# Patient Record
Sex: FEMALE | Race: BLACK OR AFRICAN AMERICAN | Employment: UNEMPLOYED | URBAN - NONMETROPOLITAN AREA
[De-identification: names, ages, dates, MRNs, and addresses within clinical notes are randomized per-mention and may not be internally consistent; named-entity substitution may affect disease eponyms.]

---

## 2019-01-14 LAB
ALBUMIN: 3.7 G/DL (ref 3.5–5)
ALP BLD-CCNC: 63 U/L (ref 45–117)
ALT SERPL-CCNC: 16 U/L (ref 12–78)
AST SERPL-CCNC: 15 U/L (ref 15–37)
BASOPHILS ABSOLUTE: 0 10'3/UL (ref 0.1–0.2)
BASOPHILS RELATIVE PERCENT: 0.6 % (ref 0–1.7)
BILIRUB SERPL-MCNC: 0.3 MG/DL (ref 0–1)
BUN BLDV-MCNC: 10 MG/DL (ref 7–18)
CALCIUM SERPL-MCNC: 8.6 MG/DL (ref 8.5–10.1)
CHLORIDE BLD-SCNC: 108 MMOL/L (ref 97–107)
CO2: 24 MMOL/L (ref 21–32)
CREAT SERPL-MCNC: 0.81 MG/DL (ref 0.55–1.02)
EOSINOPHILS ABSOLUTE: 0.1 10'3/UL (ref 0–0.4)
EOSINOPHILS RELATIVE PERCENT: 3.5 % (ref 0–6.4)
GFR CALCULATED: > 60
GLUCOSE: 107 MG/DL (ref 70–99)
HCT VFR BLD CALC: 35.9 % (ref 34.6–44.1)
HEMOGLOBIN: 11.6 G/DL (ref 11.7–14.9)
LYMPHOCYTES ABSOLUTE: 1.2 10'3/UL (ref 0.5–3.5)
LYMPHOCYTES RELATIVE PERCENT: 33.4 % (ref 17.4–45.9)
MCH RBC QN AUTO: 27.2 PG (ref 27.8–33.2)
MCHC RBC AUTO-ENTMCNC: 32.2 G/DL (ref 32.7–34.8)
MCV RBC AUTO: 84.3 FL (ref 83–97.4)
MONOCYTES ABSOLUTE: 0.5 10'3/UL (ref 0.2–0.8)
MONOCYTES RELATIVE PERCENT: 12.8 % (ref 4.4–12)
NEUTROPHILS ABSOLUTE: 1.8 10'3/UL (ref 1.5–5.6)
NEUTROPHILS SEGMENTED: 49.7 % (ref 41.2–72.1)
PDW BLD-RTO: 15.4 % (ref 12.2–15.8)
PLATELET # BLD: 85 10'3/UL (ref 122–359)
PMV BLD AUTO: 8.7 FL (ref 7.6–10.6)
POTASSIUM SERPL-SCNC: 3.9 MMOL/L (ref 3.5–5.1)
RBC # BLD: 4.26 10'6/UL (ref 3.85–4.88)
SODIUM BLD-SCNC: 141 MMOL/L (ref 136–145)
TOTAL PROTEIN: 8 G/DL (ref 6.4–8.2)
WBC: 3.6 10'3/UL (ref 3.2–9.3)

## 2019-01-16 ENCOUNTER — OFFICE VISIT (OUTPATIENT)
Dept: FAMILY MEDICINE CLINIC | Age: 44
End: 2019-01-16
Payer: COMMERCIAL

## 2019-01-16 VITALS
OXYGEN SATURATION: 98 % | HEART RATE: 71 BPM | HEIGHT: 68 IN | SYSTOLIC BLOOD PRESSURE: 140 MMHG | BODY MASS INDEX: 31.98 KG/M2 | WEIGHT: 211 LBS | DIASTOLIC BLOOD PRESSURE: 108 MMHG

## 2019-01-16 DIAGNOSIS — D69.6 THROMBOCYTOPENIA (HCC): ICD-10-CM

## 2019-01-16 DIAGNOSIS — I10 HYPERTENSION, UNSPECIFIED TYPE: ICD-10-CM

## 2019-01-16 DIAGNOSIS — L20.9 ATOPIC DERMATITIS, UNSPECIFIED TYPE: ICD-10-CM

## 2019-01-16 DIAGNOSIS — D64.9 ANEMIA, UNSPECIFIED TYPE: ICD-10-CM

## 2019-01-16 DIAGNOSIS — L29.9 GENERALIZED PRURITUS: ICD-10-CM

## 2019-01-16 DIAGNOSIS — R59.0 AXILLARY ADENOPATHY: Primary | ICD-10-CM

## 2019-01-16 DIAGNOSIS — G47.10 EXCESSIVE SOMNOLENCE DISORDER: ICD-10-CM

## 2019-01-16 DIAGNOSIS — G93.32 CHRONIC FATIGUE SYNDROME: ICD-10-CM

## 2019-01-16 PROCEDURE — 99203 OFFICE O/P NEW LOW 30 MIN: CPT | Performed by: FAMILY MEDICINE

## 2019-01-16 RX ORDER — METOPROLOL SUCCINATE 200 MG/1
TABLET, EXTENDED RELEASE ORAL
Refills: 0 | COMMUNITY
Start: 2019-01-08 | End: 2019-03-15 | Stop reason: ALTCHOICE

## 2019-01-16 RX ORDER — CLOBETASOL PROPIONATE 0.5 MG/G
CREAM TOPICAL
COMMUNITY
End: 2020-05-06

## 2019-01-16 RX ORDER — INDOMETHACIN 50 MG/1
CAPSULE ORAL
COMMUNITY
End: 2019-04-15

## 2019-01-16 RX ORDER — IRBESARTAN AND HYDROCHLOROTHIAZIDE 150; 12.5 MG/1; MG/1
1 TABLET, FILM COATED ORAL DAILY
Qty: 30 TABLET | Refills: 5 | Status: SHIPPED | OUTPATIENT
Start: 2019-01-16 | End: 2019-02-26 | Stop reason: DRUGHIGH

## 2019-01-16 RX ORDER — FEXOFENADINE HCL 180 MG/1
TABLET ORAL
COMMUNITY
End: 2019-06-14 | Stop reason: CLARIF

## 2019-01-16 RX ORDER — AMLODIPINE BESYLATE 10 MG/1
TABLET ORAL
COMMUNITY
End: 2019-03-15 | Stop reason: SDUPTHER

## 2019-01-16 RX ORDER — PIMECROLIMUS 10 MG/G
CREAM TOPICAL
COMMUNITY
End: 2021-04-27

## 2019-01-16 RX ORDER — AVOBENZONE, HOMOSALATE, OCTISALATE, OCTOCRYLENE 30; 100; 50; 25 MG/ML; MG/ML; MG/ML; MG/ML
SPRAY TOPICAL
Refills: 0 | COMMUNITY
Start: 2019-01-11 | End: 2019-04-12 | Stop reason: SDUPTHER

## 2019-01-16 RX ORDER — NITROGLYCERIN 0.4 MG/1
0.4 TABLET SUBLINGUAL EVERY 5 MIN PRN
COMMUNITY
End: 2020-10-22

## 2019-01-16 ASSESSMENT — PATIENT HEALTH QUESTIONNAIRE - PHQ9
SUM OF ALL RESPONSES TO PHQ9 QUESTIONS 1 & 2: 0
2. FEELING DOWN, DEPRESSED OR HOPELESS: 0
SUM OF ALL RESPONSES TO PHQ QUESTIONS 1-9: 0
SUM OF ALL RESPONSES TO PHQ QUESTIONS 1-9: 0
1. LITTLE INTEREST OR PLEASURE IN DOING THINGS: 0

## 2019-01-20 PROBLEM — L80 VITILIGO: Status: ACTIVE | Noted: 2019-01-20

## 2019-01-20 ASSESSMENT — ENCOUNTER SYMPTOMS
COUGH: 0
ABDOMINAL PAIN: 0
ABDOMINAL DISTENTION: 0
CONSTIPATION: 0
BLOOD IN STOOL: 0
DIARRHEA: 0
WHEEZING: 0
SHORTNESS OF BREATH: 0
VOMITING: 0

## 2019-02-02 LAB
AGE FOR GFR: 45
ANION GAP SERPL CALCULATED.3IONS-SCNC: 13 MMOL/L
BUN BLDV-MCNC: 10 MG/DL
CALCIUM SERPL-MCNC: 9.3 MG/DL
CHLORIDE BLD-SCNC: 104 MMOL/L
CO2: 25 MMOL/L
CREAT SERPL-MCNC: 0.8 MG/DL
EGFR BF: 94 ML/MIN/1.73 M2
EGFR BM: 127 ML/MIN/1.73 M2
EGFR WF: 78 ML/MIN/1.73 M2
EGFR WM: 105 ML/MIN/1.73 M2
GLUCOSE: 98 MG/DL
POTASSIUM SERPL-SCNC: 3.9 MMOL/L
SODIUM BLD-SCNC: 138 MMOL/L

## 2019-02-15 ENCOUNTER — TELEPHONE (OUTPATIENT)
Dept: FAMILY MEDICINE CLINIC | Age: 44
End: 2019-02-15

## 2019-02-15 DIAGNOSIS — I10 ESSENTIAL HYPERTENSION: Primary | ICD-10-CM

## 2019-02-19 RX ORDER — LISINOPRIL AND HYDROCHLOROTHIAZIDE 20; 12.5 MG/1; MG/1
1 TABLET ORAL DAILY
Qty: 30 TABLET | Refills: 3 | Status: SHIPPED | OUTPATIENT
Start: 2019-02-19 | End: 2019-06-14 | Stop reason: SINTOL

## 2019-02-26 ENCOUNTER — OFFICE VISIT (OUTPATIENT)
Dept: PULMONOLOGY | Age: 44
End: 2019-02-26
Payer: COMMERCIAL

## 2019-02-26 ENCOUNTER — TELEPHONE (OUTPATIENT)
Dept: FAMILY MEDICINE CLINIC | Age: 44
End: 2019-02-26

## 2019-02-26 VITALS
WEIGHT: 218 LBS | OXYGEN SATURATION: 99 % | SYSTOLIC BLOOD PRESSURE: 148 MMHG | BODY MASS INDEX: 33.04 KG/M2 | HEIGHT: 68 IN | DIASTOLIC BLOOD PRESSURE: 92 MMHG | HEART RATE: 62 BPM

## 2019-02-26 DIAGNOSIS — G47.19 EXCESSIVE DAYTIME SLEEPINESS: Primary | ICD-10-CM

## 2019-02-26 DIAGNOSIS — R22.1 LOCALIZED SWELLING, MASS OR LUMP OF NECK: Primary | ICD-10-CM

## 2019-02-26 DIAGNOSIS — I10 RESISTANT HYPERTENSION: ICD-10-CM

## 2019-02-26 DIAGNOSIS — I10 ESSENTIAL HYPERTENSION: Primary | ICD-10-CM

## 2019-02-26 DIAGNOSIS — R53.83 FATIGUE, UNSPECIFIED TYPE: ICD-10-CM

## 2019-02-26 DIAGNOSIS — R06.09 DYSPNEA ON EXERTION: ICD-10-CM

## 2019-02-26 DIAGNOSIS — R22.1 LOCALIZED SWELLING, MASS OR LUMP OF NECK: ICD-10-CM

## 2019-02-26 PROCEDURE — 99205 OFFICE O/P NEW HI 60 MIN: CPT | Performed by: INTERNAL MEDICINE

## 2019-02-26 RX ORDER — OMEPRAZOLE 20 MG/1
20 CAPSULE, DELAYED RELEASE ORAL DAILY PRN
COMMUNITY
End: 2021-04-27

## 2019-02-26 ASSESSMENT — SLEEP AND FATIGUE QUESTIONNAIRES
HOW LIKELY ARE YOU TO NOD OFF OR FALL ASLEEP WHILE WATCHING TV: 0
HOW LIKELY ARE YOU TO NOD OFF OR FALL ASLEEP WHEN YOU ARE A PASSENGER IN A CAR FOR AN HOUR WITHOUT A BREAK: 1
HOW LIKELY ARE YOU TO NOD OFF OR FALL ASLEEP WHILE SITTING AND READING: 2
HOW LIKELY ARE YOU TO NOD OFF OR FALL ASLEEP WHILE SITTING AND TALKING TO SOMEONE: 0
ESS TOTAL SCORE: 4
HOW LIKELY ARE YOU TO NOD OFF OR FALL ASLEEP WHILE LYING DOWN TO REST IN THE AFTERNOON WHEN CIRCUMSTANCES PERMIT: 1
HOW LIKELY ARE YOU TO NOD OFF OR FALL ASLEEP WHILE SITTING QUIETLY AFTER LUNCH WITHOUT ALCOHOL: 0
HOW LIKELY ARE YOU TO NOD OFF OR FALL ASLEEP IN A CAR, WHILE STOPPED FOR A FEW MINUTES IN TRAFFIC: 0
HOW LIKELY ARE YOU TO NOD OFF OR FALL ASLEEP WHILE SITTING INACTIVE IN A PUBLIC PLACE: 0

## 2019-02-27 ENCOUNTER — TELEPHONE (OUTPATIENT)
Dept: FAMILY MEDICINE CLINIC | Age: 44
End: 2019-02-27

## 2019-03-15 ENCOUNTER — OFFICE VISIT (OUTPATIENT)
Dept: FAMILY MEDICINE CLINIC | Age: 44
End: 2019-03-15
Payer: COMMERCIAL

## 2019-03-15 VITALS
BODY MASS INDEX: 31.83 KG/M2 | HEART RATE: 92 BPM | HEIGHT: 68 IN | WEIGHT: 210 LBS | DIASTOLIC BLOOD PRESSURE: 80 MMHG | SYSTOLIC BLOOD PRESSURE: 130 MMHG | OXYGEN SATURATION: 98 %

## 2019-03-15 DIAGNOSIS — L29.9 GENERALIZED PRURITUS: ICD-10-CM

## 2019-03-15 DIAGNOSIS — G93.32 CHRONIC FATIGUE SYNDROME: ICD-10-CM

## 2019-03-15 DIAGNOSIS — R22.1 LOCALIZED SWELLING, MASS OR LUMP OF NECK: ICD-10-CM

## 2019-03-15 DIAGNOSIS — R53.83 FATIGUE, UNSPECIFIED TYPE: ICD-10-CM

## 2019-03-15 DIAGNOSIS — I10 ESSENTIAL HYPERTENSION: Primary | ICD-10-CM

## 2019-03-15 LAB
AGE FOR GFR: 45
ANION GAP SERPL CALCULATED.3IONS-SCNC: 16 MMOL/L
BUN BLDV-MCNC: 11 MG/DL (ref 7–17)
CALCIUM SERPL-MCNC: 9.6 MG/DL (ref 8.4–10.2)
CHLORIDE BLD-SCNC: 104 MMOL/L (ref 98–120)
CO2: 23 MMOL/L (ref 22–31)
CREAT SERPL-MCNC: 0.7 MG/DL (ref 0.5–1)
EGFR BF: 109 ML/MIN/1.73 M2
EGFR BM: 148 ML/MIN/1.73 M2
EGFR WF: 90 ML/MIN/1.73 M2
EGFR WM: 122 ML/MIN/1.73 M2
GLUCOSE FASTING: 97 MG/DL
GLUCOSE: 97 MG/DL (ref 65–105)
POTASSIUM SERPL-SCNC: 3.8 MMOL/L (ref 3.6–5)
SODIUM BLD-SCNC: 139 MMOL/L (ref 135–145)

## 2019-03-15 PROCEDURE — 99214 OFFICE O/P EST MOD 30 MIN: CPT | Performed by: FAMILY MEDICINE

## 2019-03-15 RX ORDER — AMLODIPINE BESYLATE 10 MG/1
10 TABLET ORAL NIGHTLY
Qty: 30 TABLET | Refills: 5 | Status: SHIPPED | OUTPATIENT
Start: 2019-03-15 | End: 2019-09-27 | Stop reason: SDUPTHER

## 2019-03-21 DIAGNOSIS — M79.89 SOFT TISSUE MASS: Primary | ICD-10-CM

## 2019-03-21 ASSESSMENT — ENCOUNTER SYMPTOMS
SHORTNESS OF BREATH: 0
ABDOMINAL PAIN: 0
CHOKING: 1
WHEEZING: 0
COUGH: 0

## 2019-03-28 ENCOUNTER — TELEPHONE (OUTPATIENT)
Dept: FAMILY MEDICINE CLINIC | Age: 44
End: 2019-03-28

## 2019-03-28 NOTE — TELEPHONE ENCOUNTER
Pt is wondering if she should continue the ibuprofen for the swelling and pain in her neck area.  Or if there are any other options for her

## 2019-04-12 DIAGNOSIS — J30.9 ALLERGIC RHINITIS, UNSPECIFIED SEASONALITY, UNSPECIFIED TRIGGER: ICD-10-CM

## 2019-04-12 DIAGNOSIS — G93.32 CHRONIC FATIGUE SYNDROME: Primary | ICD-10-CM

## 2019-04-12 RX ORDER — FEXOFENADINE HCL 180 MG/1
180 TABLET ORAL DAILY
Qty: 30 TABLET | Refills: 5 | Status: SHIPPED | OUTPATIENT
Start: 2019-04-12 | End: 2019-10-12 | Stop reason: SDUPTHER

## 2019-04-12 RX ORDER — AVOBENZONE, HOMOSALATE, OCTISALATE, OCTOCRYLENE 30; 100; 50; 25 MG/ML; MG/ML; MG/ML; MG/ML
1000 SPRAY TOPICAL DAILY
Qty: 30 TABLET | Refills: 0 | Status: SHIPPED | OUTPATIENT
Start: 2019-04-12 | End: 2019-07-12 | Stop reason: SDUPTHER

## 2019-04-15 ENCOUNTER — OFFICE VISIT (OUTPATIENT)
Dept: FAMILY MEDICINE CLINIC | Age: 44
End: 2019-04-15
Payer: COMMERCIAL

## 2019-04-15 VITALS
DIASTOLIC BLOOD PRESSURE: 79 MMHG | HEART RATE: 70 BPM | WEIGHT: 211 LBS | OXYGEN SATURATION: 90 % | SYSTOLIC BLOOD PRESSURE: 126 MMHG | BODY MASS INDEX: 32.08 KG/M2

## 2019-04-15 DIAGNOSIS — R22.1 LOCALIZED SWELLING, MASS OR LUMP OF NECK: ICD-10-CM

## 2019-04-15 DIAGNOSIS — I10 ESSENTIAL HYPERTENSION: Primary | ICD-10-CM

## 2019-04-15 PROCEDURE — 99213 OFFICE O/P EST LOW 20 MIN: CPT | Performed by: FAMILY MEDICINE

## 2019-04-15 RX ORDER — CLONIDINE HYDROCHLORIDE 0.1 MG/1
TABLET ORAL
Qty: 30 TABLET | Refills: 3 | Status: SHIPPED | OUTPATIENT
Start: 2019-04-15 | End: 2019-08-13 | Stop reason: SDUPTHER

## 2019-04-15 NOTE — PROGRESS NOTES
1200 [facility]  [address]  Dept: [phone]  DeptFax: [phone]    Jeri Padilla is a35 y.o. female who presents today for her medical conditions/complaints as noted below. Jeri Padilla is c/o of Hypertension (pt says her bp has been elevated since friday highest is 176/104, has been having ha, vision in [place]" and also has been nauseated, feels heart beating fast as well)      HPI:     HPI   Patient comes in with complaints of elevated blood pressure. This occurred over the weekend. She checked her blood pressure because of the way she was feeling. She is having a headache. Her vision seems blurred. She was nauseated. She checked her blood pressure was as high as 176/104. She says her pulse was fast.  Was as high as 112. This was on her blood pressure cuff. She thinks it was a regular pulse. Her blood pressure medications are confirmed. Also she is not taking a lot of ibuprofen or nonsteroidals. She is told by oncology that that's not a good idea. She's been taking Tylenol for pain in her neck. In the left supraclavicular fossa. She denied any dietary indiscretion. She watches her salt. She has not yet gotten her sleep study done. She states that she will call pulmonology and get the sleep study scheduled in the center that is covered by her insurance. When her blood pressure is elevated she was not diaphoretic. She did not experience any palpitations. Just transient elevation of her pulse. No systemic symptoms. No fevers or chills. No waking or weight loss. She states that she did have her thyroid functions done here at the hospital now with cannot locate that laboratory as of yet. Did not repeat a TSH as she knows she had it done but she no longer has headache. Her nausea is better but she has been eating much. Taking fluids. Water. For fear that she would get sick if she ate too much.   She does not have stomach pain. BP Readings from Last 3 Encounters:   04/15/19 126/79   03/15/19 130/80   02/26/19 (!) 148/92            (goal 120/80)    No past medical history on file. No past surgical history on file. No family history on file. Social History     Tobacco Use    Smoking status: Never Smoker    Smokeless tobacco: Never Used   Substance Use Topics    Alcohol use: Not on file        Current Outpatient Medications   Medication Sig Dispense Refill    cloNIDine (CATAPRES) 0.1 MG tablet Take one as needed for blood pressure over 160 30 tablet 3    RA VITAMIN D-3 1000 units TABS tablet Take 1 tablet by mouth daily 30 tablet 0    fexofenadine (ALLEGRA) 180 MG tablet Take 1 tablet by mouth daily 30 tablet 5    amLODIPine (NORVASC) 10 MG tablet Take 1 tablet by mouth nightly 30 tablet 5    omeprazole (PRILOSEC) 20 MG delayed release capsule Take 20 mg by mouth daily      lisinopril-hydrochlorothiazide (PRINZIDE;ZESTORETIC) 20-12.5 MG per tablet Take 1 tablet by mouth daily 30 tablet 3    fexofenadine (ALLEGRA) 180 MG tablet Allergy Relief (fexofenadine) 180 mg tablet      clobetasol (TEMOVATE) 0.05 % cream clobetasol 0.05 % topical cream      pimecrolimus (ELIDEL) 1 % cream Elidel 1 % topical cream      Carbonyl Iron 25 MG TABS 3 tablets daily      nitroGLYCERIN (NITROSTAT) 0.4 MG SL tablet Place 0.4 mg under the tongue every 5 minutes as needed for Chest pain up to max of 3 total doses. If no relief after 1 dose, call 911.  Omega-3 Fatty Acids (FISH OIL OMEGA-3 PO) Take by mouth      MISC NATURAL PRODUCTS PO Take by mouth Vitamin c, folic acid, and b complex       No current facility-administered medications for this visit.       Allergies   Allergen Reactions    Dexlansoprazole     Tetanus Toxoid        Health Maintenance   Topic Date Due    Cervical cancer screen  08/05/1994    Lipid screen  08/05/2013    HIV screen  01/16/2029 (Originally 8/5/1988)    Flu vaccine (Season Ended) 09/01/2019    Potassium monitoring  03/15/2020    Creatinine monitoring  03/15/2020    Pneumococcal 0-64 years Vaccine  Aged Out       Lab Results   Component Value Date    K 3.8 03/15/2019    CREATININE 0.7 03/15/2019    AST 15 01/14/2019    ALT 16 01/14/2019    HCT 35.9 01/14/2019    GLUCOSE 97 03/15/2019    CALCIUM 9.6 03/15/2019    No results found for: CHOL, TRIG, HDL, LDLCHOLESTEROL    Subjective:      Review of Systems   Constitutional: Positive for appetite change. Negative for fatigue, fever and unexpected weight change. HENT: Negative. Respiratory: Negative. Cardiovascular: Negative for chest pain, palpitations and leg swelling. Gastrointestinal: Positive for nausea. Negative for abdominal distention, abdominal pain and vomiting. Neurological: Positive for headaches. Negative for tremors and weakness. Hematological: Negative for adenopathy. Psychiatric/Behavioral: Negative. Objective:     /79   Pulse 70   Wt 211 lb (95.7 kg)   SpO2 90%   BMI 32.08 kg/m²     Physical Exam   Constitutional: No distress. HENT:   Right Ear: Tympanic membrane normal.   Left Ear: Tympanic membrane normal.   Mouth/Throat: Oropharynx is clear and moist and mucous membranes are normal.   Neck: Normal range of motion. Normal carotid pulses present. Carotid bruit is not present. No tracheal deviation present. Thyromegaly present. Cardiovascular: Normal rate, regular rhythm, S1 normal, S2 normal and normal heart sounds. No murmur heard. Pulmonary/Chest: She has no decreased breath sounds. She has no wheezes. She has no rhonchi. She has no rales. Abdominal: Soft. Bowel sounds are normal. She exhibits no distension, no abdominal bruit and no mass. There is no tenderness. Assessment:      Diagnosis Orders   1. Essential hypertension  cloNIDine (CATAPRES) 0.1 MG tablet   2.  Localized swelling, mass or lump of neck              POC Testing Results (If Applicable):  No results found for this visit on 04/15/19. Plan:   Blood pressures confirmed his pain well controlled at the present time. She maintained cancer diagnosed activity. Continue current medications. She needs a sleep study. Deferred workup for pheochromocytoma. She is given a prescription for clonidine that she can take should the symptoms recur. And return to the office at that time. Otherwise return the office as scheduled. Orders Given:  No orders of the defined types were placed in this encounter. Prescriptions:    Orders Placed This Encounter   Medications    cloNIDine (CATAPRES) 0.1 MG tablet     Sig: Take one as needed for blood pressure over 160     Dispense:  30 tablet     Refill:  3        Return in about 2 months (around 6/14/2019). Electronically signed by Jade Claros MD on4/16/2019. **This report has been created using voice recognition software. It may contain minor errors which are inherent in voice recognition technology.

## 2019-04-16 ENCOUNTER — TELEPHONE (OUTPATIENT)
Dept: FAMILY MEDICINE CLINIC | Age: 44
End: 2019-04-16

## 2019-04-16 DIAGNOSIS — G47.10 EXCESSIVE SOMNOLENCE DISORDER: Primary | ICD-10-CM

## 2019-04-16 ASSESSMENT — ENCOUNTER SYMPTOMS
ABDOMINAL DISTENTION: 0
RESPIRATORY NEGATIVE: 1
ABDOMINAL PAIN: 0
NAUSEA: 1
VOMITING: 0

## 2019-04-16 NOTE — TELEPHONE ENCOUNTER
Called to let us know that [facility] does not take her insurance so they can not schedule her Pulmonology, she will need to go to [facility].    She believes she Rocio Geronimo is unaware of that and then they will have to continue to follow her    Needs sleep study set up with [facility]

## 2019-04-29 ENCOUNTER — TELEPHONE (OUTPATIENT)
Dept: FAMILY MEDICINE CLINIC | Age: 44
End: 2019-04-29

## 2019-05-03 ENCOUNTER — TELEPHONE (OUTPATIENT)
Dept: FAMILY MEDICINE CLINIC | Age: 44
End: 2019-05-03

## 2019-06-14 ENCOUNTER — OFFICE VISIT (OUTPATIENT)
Dept: FAMILY MEDICINE CLINIC | Age: 44
End: 2019-06-14
Payer: COMMERCIAL

## 2019-06-14 VITALS
WEIGHT: 205 LBS | SYSTOLIC BLOOD PRESSURE: 152 MMHG | BODY MASS INDEX: 31.17 KG/M2 | OXYGEN SATURATION: 99 % | HEART RATE: 85 BPM | DIASTOLIC BLOOD PRESSURE: 100 MMHG

## 2019-06-14 DIAGNOSIS — D69.6 THROMBOCYTOPENIA (HCC): ICD-10-CM

## 2019-06-14 DIAGNOSIS — G47.10 EXCESSIVE SOMNOLENCE DISORDER: ICD-10-CM

## 2019-06-14 DIAGNOSIS — G93.32 CHRONIC FATIGUE SYNDROME: ICD-10-CM

## 2019-06-14 DIAGNOSIS — I10 ESSENTIAL HYPERTENSION: Primary | ICD-10-CM

## 2019-06-14 DIAGNOSIS — B07.9 VIRAL WARTS, UNSPECIFIED TYPE: ICD-10-CM

## 2019-06-14 PROCEDURE — 99214 OFFICE O/P EST MOD 30 MIN: CPT | Performed by: FAMILY MEDICINE

## 2019-06-14 RX ORDER — LOSARTAN POTASSIUM AND HYDROCHLOROTHIAZIDE 12.5; 1 MG/1; MG/1
1 TABLET ORAL DAILY
Qty: 90 TABLET | Refills: 3 | Status: SHIPPED | OUTPATIENT
Start: 2019-06-14 | End: 2020-10-22 | Stop reason: SDUPTHER

## 2019-06-14 NOTE — PROGRESS NOTES
1200 [facility]  [address]  Dept: [phone]  DeptFax: [phone]    Rosemary Mercado is a35 y.o. female who presents today for her medical conditions/complaints as noted below. Rosemary Mercado is c/o of 3 Month Follow-Up (pt says she still has a cough, says is annoying and is getting worse. also states has a wart on her middle finger of right hand)      HPI:     HPI     Patient returns. 3-month checkup. Problems include    Hypertension. She remains on amlodipine 10 mg and lisinopril hydrochlorothiazide. She checks her blood pressures and they go up down but she says the clonidine and she has yet needed to use. Unfortunately we continue to struggle with getting her sleep study done. She says that she received calls from [facility] in [facility] and also from [place]. But nobody ever called back. I was under the impression that she will have to go outside to [facility]. We put in referral to [facility] for sleep study. Back in May. She has no chest pain or chest pressure. However she does complain of a dry cough. Nagging dry cough. Nonproductive. She has no edema. She denies shortness of breath or wheezing. No problems with swallowing or swallowing her throat. She continues to follow-up with oncology in [facility]. This is for thrombocytopenia or  thrombocytosis. She is due to follow-up with them shortly. We have no records directly from them. She is not currently taking any medications. She also has followed up with them for lymphadenopathy. And we recently did an ultrasound which documented a lipoma of the left supraclavicular fossa. She has not had any bleeding. She has no night sweats fevers or chills. She had a left axillary lymph node biopsy needle biopsy in May 2018 which was negative    She wants to know what can be done about her tinnitus. She has constant ringing of ears.   She does c, folic acid, and b complex       No current facility-administered medications for this visit. Allergies   Allergen Reactions    Dexlansoprazole     Tetanus Toxoid        Health Maintenance   Topic Date Due    Cervical cancer screen  08/05/1994    Lipid screen  08/05/2013    HIV screen  01/16/2029 (Originally 8/5/1988)    Flu vaccine (Season Ended) 09/01/2019    Potassium monitoring  03/15/2020    Creatinine monitoring  03/15/2020    Pneumococcal 0-64 years Vaccine  Aged Out       Lab Results   Component Value Date    K 3.8 03/15/2019    CREATININE 0.7 03/15/2019    AST 15 01/14/2019    ALT 16 01/14/2019    HCT 35.9 01/14/2019    GLUCOSE 97 03/15/2019    CALCIUM 9.6 03/15/2019    No results found for: CHOL, TRIG, HDL, LDLCHOLESTEROL    Subjective:      Review of Systems   Constitutional: Negative for appetite change, fatigue, fever and unexpected weight change. HENT: Positive for tinnitus. Negative for congestion, hearing loss, rhinorrhea, sinus pain and sore throat. Respiratory: Positive for cough. Negative for shortness of breath and wheezing. Cardiovascular: Negative for chest pain, palpitations and leg swelling. Gastrointestinal: Positive for nausea. Negative for abdominal distention, abdominal pain and vomiting. Neurological: Negative for tremors, weakness and headaches. Hematological: Negative for adenopathy. Psychiatric/Behavioral: Negative. Negative for dysphoric mood. Objective:     BP (!) 152/100   Pulse 85   Wt 205 lb (93 kg)   SpO2 99%   BMI 31.17 kg/m²     Physical Exam   Constitutional: No distress. HENT:   Right Ear: Tympanic membrane normal.   Left Ear: Tympanic membrane normal.   Mouth/Throat: Oropharynx is clear and moist and mucous membranes are normal.   Neck: Normal range of motion. Normal carotid pulses present. Carotid bruit is not present. No tracheal deviation present. Thyromegaly present.        Cardiovascular: Normal rate, regular rhythm, S1 normal, S2 normal and normal heart sounds. No murmur heard. Pulmonary/Chest: She has no decreased breath sounds. She has no wheezes. She has no rhonchi. She has no rales. Abdominal: Soft. Bowel sounds are normal. She exhibits no distension, no abdominal bruit and no mass. There is no tenderness. Musculoskeletal: She exhibits no edema. A couple of minute warts on the third and fourth fingers of the right hand. Barely visible       Assessment:      Diagnosis Orders   1. Essential hypertension     2. Excessive somnolence disorder     3. Chronic fatigue syndrome     4. Thrombocytopenia ([address]     5. Viral warts, unspecified type  salicylic acid 17 % gel            POC Testing Results (If Applicable):  No results found for this visit on 06/14/19. Plan: We will switch from lisinopril hydrochlorothiazide to losartan hydrochlorothiazide. Will try salicylic acid for the warts so they are very small. Suggested that she try some ambient noise to help her sleep at night. I am not familiar with central audio processing disorder. Not sure where she got that diagnosis although I am presuming is from [facility]. She will continue to monitor her blood pressure and recheck with me in 3 months sooner if any problems it would be nice if oncology would send me some notes. Orders Given:  No orders of the defined types were placed in this encounter. Prescriptions:    Orders Placed This Encounter   Medications    losartan-hydrochlorothiazide (HYZAAR) 100-12.5 MG per tablet     Sig: Take 1 tablet by mouth daily     Dispense:  90 tablet     Refill:  3    salicylic acid 17 % gel     Sig: Apply topically daily. Dispense:  1.25 g     Refill:  0        Return in about 3 months (around 9/14/2019). Electronically signed by Laly Stroud MD on6/16/2019. **This report has been created using voice recognition software.   It may contain minor errors which are inherent in voice recognition technology. **

## 2019-06-16 ASSESSMENT — ENCOUNTER SYMPTOMS
COUGH: 1
SINUS PAIN: 0
ABDOMINAL PAIN: 0
ABDOMINAL DISTENTION: 0
SHORTNESS OF BREATH: 0
RHINORRHEA: 0
VOMITING: 0
SORE THROAT: 0
NAUSEA: 1
WHEEZING: 0

## 2019-07-12 DIAGNOSIS — G93.32 CHRONIC FATIGUE SYNDROME: ICD-10-CM

## 2019-07-12 RX ORDER — AVOBENZONE, HOMOSALATE, OCTISALATE, OCTOCRYLENE 30; 100; 50; 25 MG/ML; MG/ML; MG/ML; MG/ML
SPRAY TOPICAL
Qty: 30 TABLET | Refills: 0 | Status: SHIPPED | OUTPATIENT
Start: 2019-07-12 | End: 2019-09-11 | Stop reason: SDUPTHER

## 2019-08-13 DIAGNOSIS — I10 ESSENTIAL HYPERTENSION: ICD-10-CM

## 2019-08-13 RX ORDER — CLONIDINE HYDROCHLORIDE 0.1 MG/1
TABLET ORAL
Qty: 30 TABLET | Refills: 3 | Status: SHIPPED | OUTPATIENT
Start: 2019-08-13 | End: 2019-12-08 | Stop reason: SDUPTHER

## 2019-08-20 LAB
ALBUMIN: 4.3 G/DL (ref 3.5–5)
ALP BLD-CCNC: 68 U/L (ref 45–117)
ALT SERPL-CCNC: 15 U/L (ref 12–78)
AST SERPL-CCNC: 22 U/L (ref 15–37)
BASOPHILS ABSOLUTE: 0 10'3/UL (ref 0.1–0.2)
BASOPHILS RELATIVE PERCENT: 0.6 % (ref 0–1.7)
BILIRUB SERPL-MCNC: 0.4 MG/DL (ref 0–1)
BUN BLDV-MCNC: 12 MG/DL (ref 7–18)
CALCIUM SERPL-MCNC: 9.7 MG/DL (ref 8.5–10.1)
CHLORIDE BLD-SCNC: 104 MMOL/L (ref 97–107)
CO2: 25 MMOL/L (ref 21–32)
CREAT SERPL-MCNC: 0.8 MG/DL (ref 0.55–1.02)
EOSINOPHILS ABSOLUTE: 0.1 10'3/UL (ref 0–0.4)
EOSINOPHILS RELATIVE PERCENT: 2.3 % (ref 0–6.4)
FERRITIN: 20.1 NG/ML (ref 8–388)
GFR CALCULATED: > 60
GLUCOSE: 126 MG/DL (ref 70–99)
HCT VFR BLD CALC: 32.7 % (ref 34.6–44.1)
HEMOGLOBIN: 10.5 G/DL (ref 11.7–14.9)
IRON SATURATION: 7 % (ref 20–50)
IRON: 27 UG/DL (ref 35–150)
LYMPHOCYTES ABSOLUTE: 0.9 10'3/UL (ref 0.5–3.5)
LYMPHOCYTES RELATIVE PERCENT: 16.2 % (ref 17.4–45.9)
MCH RBC QN AUTO: 25.1 PG (ref 27.8–33.2)
MCHC RBC AUTO-ENTMCNC: 32 G/DL (ref 32.7–34.8)
MCV RBC AUTO: 78.5 FL (ref 83–97.4)
MICROCYTOSIS: ABNORMAL
MONOCYTES ABSOLUTE: 0.4 10'3/UL (ref 0.2–0.8)
MONOCYTES RELATIVE PERCENT: 7 % (ref 4.4–12)
NEUTROPHILS ABSOLUTE: 4.1 10'3/UL (ref 1.5–5.6)
NEUTROPHILS SEGMENTED: 73.9 % (ref 41.2–72.1)
PDW BLD-RTO: 16.8 % (ref 12.2–15.8)
PLATELET # BLD: 81 10'3/UL (ref 122–359)
PMV BLD AUTO: 9.9 FL (ref 7.6–10.6)
POTASSIUM SERPL-SCNC: 3.3 MMOL/L (ref 3.5–5.1)
RBC # BLD: 4.17 10'6/UL (ref 3.85–4.88)
SODIUM BLD-SCNC: 138 MMOL/L (ref 136–145)
TOTAL IRON BINDING CAPACITY: 375 UG/DL (ref 250–450)
TOTAL PROTEIN: 8.1 G/DL (ref 6.4–8.2)
TRANSFERRIN: 9 % (ref 20–50)
UNSATURATED IRON BINDING CAPACITY: 348 UG/DL (ref 69–240)
WBC: 5.6 10'3/UL (ref 3.2–9.3)

## 2019-09-11 ENCOUNTER — OFFICE VISIT (OUTPATIENT)
Dept: FAMILY MEDICINE CLINIC | Age: 44
End: 2019-09-11
Payer: COMMERCIAL

## 2019-09-11 ENCOUNTER — TELEPHONE (OUTPATIENT)
Dept: PULMONOLOGY | Age: 44
End: 2019-09-11

## 2019-09-11 VITALS
BODY MASS INDEX: 30.56 KG/M2 | HEART RATE: 69 BPM | OXYGEN SATURATION: 99 % | WEIGHT: 201 LBS | SYSTOLIC BLOOD PRESSURE: 130 MMHG | DIASTOLIC BLOOD PRESSURE: 88 MMHG

## 2019-09-11 DIAGNOSIS — I80.8 THROMBOPHLEBITIS ARM: ICD-10-CM

## 2019-09-11 DIAGNOSIS — E55.9 VITAMIN D DEFICIENCY: ICD-10-CM

## 2019-09-11 DIAGNOSIS — G47.33 OSA (OBSTRUCTIVE SLEEP APNEA): Primary | ICD-10-CM

## 2019-09-11 DIAGNOSIS — G93.32 CHRONIC FATIGUE SYNDROME: ICD-10-CM

## 2019-09-11 PROCEDURE — 99213 OFFICE O/P EST LOW 20 MIN: CPT | Performed by: FAMILY MEDICINE

## 2019-09-11 RX ORDER — AVOBENZONE, HOMOSALATE, OCTISALATE, OCTOCRYLENE 30; 100; 50; 25 MG/ML; MG/ML; MG/ML; MG/ML
SPRAY TOPICAL
Qty: 30 TABLET | Refills: 5 | Status: SHIPPED | OUTPATIENT
Start: 2019-09-11 | End: 2020-05-06

## 2019-09-11 ASSESSMENT — ENCOUNTER SYMPTOMS
SHORTNESS OF BREATH: 0
WHEEZING: 0
GASTROINTESTINAL NEGATIVE: 1
COUGH: 0

## 2019-09-11 NOTE — PROGRESS NOTES
losartan-hydrochlorothiazide (HYZAAR) 100-12.5 MG per tablet Take 1 tablet by mouth daily 90 tablet 3    salicylic acid 17 % gel Apply topically daily. 1.25 g 0    fexofenadine (ALLEGRA) 180 MG tablet Take 1 tablet by mouth daily 30 tablet 5    amLODIPine (NORVASC) 10 MG tablet Take 1 tablet by mouth nightly 30 tablet 5    omeprazole (PRILOSEC) 20 MG delayed release capsule Take 20 mg by mouth daily      clobetasol (TEMOVATE) 0.05 % cream clobetasol 0.05 % topical cream      pimecrolimus (ELIDEL) 1 % cream Elidel 1 % topical cream      Carbonyl Iron 25 MG TABS 3 tablets daily      nitroGLYCERIN (NITROSTAT) 0.4 MG SL tablet Place 0.4 mg under the tongue every 5 minutes as needed for Chest pain up to max of 3 total doses. If no relief after 1 dose, call 911.  MISC NATURAL PRODUCTS PO Take by mouth Vitamin c, folic acid, and b complex       No current facility-administered medications for this visit. Allergies   Allergen Reactions    Dexlansoprazole     Tetanus Toxoid        Health Maintenance   Topic Date Due    Cervical cancer screen  08/05/1994    Lipid screen  08/05/2013    Flu vaccine (1) 09/01/2019    HIV screen  01/16/2029 (Originally 8/5/1988)    Potassium monitoring  08/20/2020    Creatinine monitoring  08/20/2020    Diabetes screen  03/15/2022    Pneumococcal 0-64 years Vaccine  Aged Out       Lab Results   Component Value Date    K 3.3 08/20/2019    CREATININE 0.80 08/20/2019    AST 22 08/20/2019    ALT 15 08/20/2019    HCT 32.7 08/20/2019    GLUCOSE 126 08/20/2019    CALCIUM 9.7 08/20/2019    FERRITIN 20.1 08/20/2019    TIBC 375 08/20/2019    IRON 27 08/20/2019    No results found for: CHOL, TRIG, HDL, LDLCHOLESTEROL    Subjective:      Review of Systems   Constitutional: Positive for fatigue. Negative for fever. HENT: Negative. Respiratory: Negative for cough, shortness of breath and wheezing. Cardiovascular: Negative for chest pain, palpitations and leg swelling.  Vitamin D 25 Hydroxy     Standing Status:   Future     Standing Expiration Date:   9/11/2020   Moraima Jasmine MD, Pulmonology, [place]     Referral Priority:   Urgent     Referral Type:   Eval and Treat     Referral Reason:   Specialty Services Required     Requested Specialty:   Pulmonology     Number of Visits Requested:   1    Ultrasound doppler venous arm left     Standing Status:   Future     Standing Expiration Date:   11/10/2019     Prescriptions:    Orders Placed This Encounter   Medications    RA VITAMIN D-3 1000 units TABS tablet     Sig: take 1 tablet by mouth once daily     Dispense:  30 tablet     Refill:  5        Return in about 8 days (around 9/19/2019). Electronically signed by Brian Jacques MD on9/11/2019. **This report has been created using voice recognition software. It may contain minor errors which are inherent in voice recognition technology. **

## 2019-09-13 ENCOUNTER — TELEPHONE (OUTPATIENT)
Dept: FAMILY MEDICINE CLINIC | Age: 44
End: 2019-09-13

## 2019-09-16 LAB
% SATURATION: 12 % (ref 15–38)
A/G RATIO: 1.2 RATIO
AGE FOR GFR: 46
ALBUMIN: 4.3 G/DL (ref 3.5–5)
ALK PHOSPHATASE: 63 UNITS/L (ref 38–126)
ALT SERPL-CCNC: 16 UNITS/L (ref 9–52)
ANION GAP SERPL CALCULATED.3IONS-SCNC: 15 MMOL/L
ANISOCYTOSIS: ABNORMAL
AST SERPL-CCNC: 30 UNITS/L (ref 14–36)
BASOPHILS # BLD: 0.07 THOU/MM3 (ref 0–0.3)
BILIRUB SERPL-MCNC: 0.5 MG/DL (ref 0.2–1.3)
BUN BLDV-MCNC: 9 MG/DL (ref 7–17)
CALCIUM SERPL-MCNC: 9.5 MG/DL (ref 8.4–10.2)
CHLORIDE BLD-SCNC: 102 MMOL/L (ref 98–120)
CO2: 26 MMOL/L (ref 22–31)
CREAT SERPL-MCNC: 0.7 MG/DL (ref 0.5–1)
DIFFERENTIAL: AUTOMATED DIFF
EGFR BF: 109 ML/MIN/1.73 M2
EGFR BM: 147 ML/MIN/1.73 M2
EGFR WF: 90 ML/MIN/1.73 M2
EGFR WM: 121 ML/MIN/1.73 M2
EOSINOPHIL # BLD: 0.11 THOU/MM3 (ref 0–1.1)
FERRITIN: 43 NG/ML (ref 10–28)
GLOBULIN: 3.5 G/DL
GLUCOSE: 91 MG/DL (ref 65–105)
HCT VFR BLD CALC: 37.3 % (ref 37–47)
HEMOGLOBIN: 10.9 G/DL (ref 12–16)
HYPOCHROMIA: ABNORMAL
IRON: 48 MG/DL (ref 37–170)
LYMPHOCYTES # BLD: 0.78 THOU/MM3 (ref 1–5.5)
MCH RBC QN AUTO: 24.7 PG (ref 28.5–32)
MCHC RBC AUTO-ENTMCNC: 29.2 G/DL (ref 32–37)
MCV RBC AUTO: 84.5 FL (ref 80–94)
MICROCYTOSIS: ABNORMAL
MONOCYTES # BLD: 0.46 THOU/MM3 (ref 0.1–1)
MORPHOLOGY: ABNORMAL
NEUTROPHILS: 2.37 THOU/MM3 (ref 2–8.1)
OVALOCYTES: ABNORMAL
PDW BLD-RTO: 18.7 % (ref 8.5–15.5)
PLATELET # BLD: 80 THOU/MM3 (ref 130–400)
PMV BLD AUTO: 8 FL (ref 7.4–11)
POTASSIUM SERPL-SCNC: 3.6 MMOL/L (ref 3.6–5)
RBC # BLD: 4.41 M/UL (ref 4.2–5.4)
SODIUM BLD-SCNC: 139 MMOL/L (ref 135–145)
TOTAL IRON BINDING CAPACITY: 399 MG/DL (ref 261–497)
TOTAL PROTEIN: 7.8 G/DL (ref 6.3–8.2)
WBC # BLD: 3.79 THOU/ML3 (ref 4.8–10)

## 2019-09-19 ENCOUNTER — OFFICE VISIT (OUTPATIENT)
Dept: FAMILY MEDICINE CLINIC | Age: 44
End: 2019-09-19
Payer: COMMERCIAL

## 2019-09-19 VITALS
BODY MASS INDEX: 30.87 KG/M2 | DIASTOLIC BLOOD PRESSURE: 88 MMHG | WEIGHT: 203 LBS | HEART RATE: 88 BPM | OXYGEN SATURATION: 95 % | SYSTOLIC BLOOD PRESSURE: 136 MMHG

## 2019-09-19 DIAGNOSIS — E55.9 VITAMIN D DEFICIENCY: ICD-10-CM

## 2019-09-19 DIAGNOSIS — G47.33 OSA (OBSTRUCTIVE SLEEP APNEA): ICD-10-CM

## 2019-09-19 DIAGNOSIS — G93.32 CHRONIC FATIGUE SYNDROME: ICD-10-CM

## 2019-09-19 DIAGNOSIS — I10 ESSENTIAL HYPERTENSION: ICD-10-CM

## 2019-09-19 DIAGNOSIS — I80.8 THROMBOPHLEBITIS ARM: Primary | ICD-10-CM

## 2019-09-19 PROCEDURE — 99214 OFFICE O/P EST MOD 30 MIN: CPT | Performed by: FAMILY MEDICINE

## 2019-09-19 ASSESSMENT — ENCOUNTER SYMPTOMS
WHEEZING: 0
COUGH: 0
GASTROINTESTINAL NEGATIVE: 1
SHORTNESS OF BREATH: 0
BACK PAIN: 0

## 2019-09-19 NOTE — PROGRESS NOTES
[address] [place] [address]  Dept: [phone]  DeptFax: [phone]    Vince Abarca is a51 y.o. female who presents today for her medical conditions/complaints as noted below. Vince Abarca is c/o of Hypertension      HPI:     HPI     Follow-up from previous visit. Her venous Doppler did indeed show a thrombus in the cephalic vein. Proximal to the elbow. Yesterday a prescription for Eliquis was sent to the pharmacy. And she was given samples of eliquis along with a rebate card. She has been under the care of Heme/Onc at [facility] though we are unable to access any records as of yet. She confirms that she has never had a blood clot before. He is otherwise asymptomatic. The pain is just located to the antecubital fossa. She has no cough. No hemoptysis        In addition at her last visit she was very frustrated about her sleep apnea and lack of follow-up. We attempted to refer her back to [facility] pulmonology. But due to her insurance that was not possible. She has contacted pulmonology at [facility]. Namely just received a request to fax her information to numbers at [facility]. Though these are [facility]. Dr. Cris Martin. She actually has an appointment October 3. Iron deficiency anemia. She has an iron infusion coming up. She wonders if that can be completed with the blood clot. She has not had previous infusion in the left arm. Hypertension  Well-controlled today. Confirmed that she takes her lisinopril hydrochlorothiazide in the morning and uses clonidine as needed. PRN systolic greater than 436. Does not have to use it very often. And she takes her amlodipine at bedtime.       BP Readings from Last 3 Encounters:   09/19/19 136/88   09/11/19 130/88   06/14/19 (!) 152/100            (goal 120/80)    Past Medical History:   Diagnosis Date    Chronic fatigue     Hypertension     Iron deficiency anemia     CALLY (obstructive sleep apnea)       Past Surgical History:   Procedure Laterality Date    LYMPH NODE BIOPSY       No family history on file. Social History     Tobacco Use    Smoking status: Never Smoker    Smokeless tobacco: Never Used   Substance Use Topics    Alcohol use: Not on file        Current Outpatient Medications   Medication Sig Dispense Refill    apixaban (ELIQUIS) 5 MG TABS tablet Take 1 tablet by mouth 2 times daily 60 tablet 1    RA VITAMIN D-3 1000 units TABS tablet take 1 tablet by mouth once daily 30 tablet 5    cloNIDine (CATAPRES) 0.1 MG tablet take 1 tablet by mouth once daily if needed for BLOOD PRESSURE OVER 160 30 tablet 3    losartan-hydrochlorothiazide (HYZAAR) 100-12.5 MG per tablet Take 1 tablet by mouth daily 90 tablet 3    salicylic acid 17 % gel Apply topically daily. 1.25 g 0    fexofenadine (ALLEGRA) 180 MG tablet Take 1 tablet by mouth daily 30 tablet 5    amLODIPine (NORVASC) 10 MG tablet Take 1 tablet by mouth nightly 30 tablet 5    omeprazole (PRILOSEC) 20 MG delayed release capsule Take 20 mg by mouth daily as needed      clobetasol (TEMOVATE) 0.05 % cream clobetasol 0.05 % topical cream      pimecrolimus (ELIDEL) 1 % cream Elidel 1 % topical cream      Carbonyl Iron 25 MG TABS 3 tablets daily      nitroGLYCERIN (NITROSTAT) 0.4 MG SL tablet Place 0.4 mg under the tongue every 5 minutes as needed for Chest pain up to max of 3 total doses. If no relief after 1 dose, call 911.  MISC NATURAL PRODUCTS PO Take by mouth Vitamin c, folic acid, and b complex       No current facility-administered medications for this visit.       Allergies   Allergen Reactions    Dexlansoprazole     Tetanus Toxoid        Health Maintenance   Topic Date Due    Cervical cancer screen  08/05/1994    Lipid screen  08/05/2013    Flu vaccine (1) 09/01/2019    HIV screen  01/16/2029 (Originally 8/5/1988)    Potassium monitoring  09/16/2020    Creatinine monitoring  09/16/2020    Pneumococcal 0-64 years Vaccine  Aged Out       Lab Results   Component Value Date    K 3.6 09/16/2019    CREATININE 0.7 09/16/2019    AST 30 09/16/2019    ALT 16 09/16/2019    HCT 37.3 09/16/2019    GLUCOSE 91 09/16/2019    CALCIUM 9.5 09/16/2019    FERRITIN 43 09/16/2019    TIBC 399 09/16/2019    IRON 48 09/16/2019    No results found for: CHOL, TRIG, HDL, LDLCHOLESTEROL    Subjective:      Review of Systems   Constitutional: Positive for fatigue (chronic). Negative for fever. HENT: Negative. Respiratory: Negative for cough, shortness of breath and wheezing. Cardiovascular: Negative for chest pain, palpitations and leg swelling. Gastrointestinal: Negative. Genitourinary: Negative. Musculoskeletal: Negative for back pain and joint swelling. Skin: Negative for rash and wound. Hematological: Negative for adenopathy. Does not bruise/bleed easily. Objective:     /88 (Site: Right Upper Arm, Position: Sitting, Cuff Size: Large Adult)   Pulse 88   Wt 203 lb (92.1 kg)   SpO2 95%   BMI 30.87 kg/m²     Physical Exam   Constitutional: No distress. Neck: Normal range of motion. Normal carotid pulses present. Carotid bruit is not present. No tracheal deviation present. No thyromegaly present. Cardiovascular: Normal rate, regular rhythm, S1 normal, S2 normal and normal heart sounds. No murmur heard. Pulmonary/Chest: She has no decreased breath sounds. She has no wheezes. She has no rhonchi. She has no rales. Chest wall is not dull to percussion. Abdominal: Soft. Bowel sounds are normal.   Musculoskeletal: She exhibits no edema. Left elbow: She exhibits normal range of motion and no swelling. Tenderness found. Lateral epicondyle tenderness noted. Arms:  Lymphadenopathy:     She has no cervical adenopathy. Left axillary: No pectoral and no lateral adenopathy present. Assessment:      Diagnosis Orders   1.  Thrombophlebitis arm Ultrasound doppler venous arm left   2. Chronic fatigue syndrome     3. CALLY (obstructive sleep apnea)     4. Vitamin D deficiency     5. Essential hypertension              POC Testing Results (If Applicable):  No results found for this visit on 09/19/19. Plan:     She remains on anticoagulation. If she cannot afford the Eliquis she needs to let us know. We have already faxed records to pulmonology at [facility]. Dr. Garret López. She will check and find out the name of her hematologist oncologist.  And sign a release of records so we can obtain them. She will continue current medications for blood pressure. I see no reason why she cannot have her iron infusion. We will repeat the venous Doppler of her left upper extremity in 6 weeks and have her follow-up shortly after that in the office. Sooner if any problems    Orders Given:  Orders Placed This Encounter   Procedures    Ultrasound doppler venous arm left     Standing Status:   Future     Standing Expiration Date:   11/18/2019     Prescriptions:    No orders of the defined types were placed in this encounter. No follow-ups on file. Electronically signed by Kathyrn Lefort, MD on9/19/2019. **This report has been created using voice recognition software. It may contain minor errors which are inherent in voice recognition technology. **

## 2019-09-27 DIAGNOSIS — I10 ESSENTIAL HYPERTENSION: Primary | ICD-10-CM

## 2019-09-27 RX ORDER — AMLODIPINE BESYLATE 10 MG/1
TABLET ORAL
Qty: 30 TABLET | Refills: 5 | Status: SHIPPED | OUTPATIENT
Start: 2019-09-27 | End: 2020-03-14

## 2019-10-12 DIAGNOSIS — J30.9 ALLERGIC RHINITIS, UNSPECIFIED SEASONALITY, UNSPECIFIED TRIGGER: ICD-10-CM

## 2019-10-14 RX ORDER — FEXOFENADINE HCL 180 MG
TABLET ORAL
Qty: 30 TABLET | Refills: 5 | Status: SHIPPED | OUTPATIENT
Start: 2019-10-14 | End: 2020-03-14

## 2019-11-19 LAB
ALBUMIN: 4.3 G/DL (ref 3.5–5.2)
ALP BLD-CCNC: 59 U/L (ref 40–150)
ALT SERPL-CCNC: 15 U/L (ref 0–55)
ANION GAP SERPL CALCULATED.3IONS-SCNC: 10 MEQ/L (ref 5–13)
AST SERPL-CCNC: 20 U/L (ref 5–34)
BASOPHILS ABSOLUTE: 0 10'3/UL (ref 0.1–0.2)
BASOPHILS RELATIVE PERCENT: 1.2 % (ref 0.3–0.8)
BILIRUB SERPL-MCNC: 0.5 MG/DL (ref 0–1)
BUN BLDV-MCNC: 9 MG/DL (ref 7–18)
CALCIUM SERPL-MCNC: 10 MG/DL (ref 8.4–10.2)
CHLORIDE BLD-SCNC: 105 MMOL/L (ref 98–107)
CO2: 27 MMOL/L (ref 22–29)
CREAT SERPL-MCNC: 0.8 MG/DL (ref 0.57–1.11)
EOSINOPHILS ABSOLUTE: 0.1 10'3/UL (ref 0–0.4)
EOSINOPHILS RELATIVE PERCENT: 3.1 % (ref 0–6.6)
FERRITIN: 108.6 NG/ML (ref 8–388)
GFR CALCULATED: > 60
GLUCOSE: 95 MG/DL (ref 70–99)
HCT VFR BLD CALC: 37.3 % (ref 34.2–44.1)
HEMOGLOBIN: 12.7 G/DL (ref 11.6–14.8)
LYMPHOCYTES ABSOLUTE: 1 10'3/UL (ref 0.7–3.3)
LYMPHOCYTES RELATIVE PERCENT: 36.5 % (ref 19.1–46.3)
MCH RBC QN AUTO: 30.5 PG (ref 27.6–33)
MCHC RBC AUTO-ENTMCNC: 34 G/DL (ref 32.9–34.5)
MCV RBC AUTO: 89.6 FL (ref 82.9–97.4)
MONOCYTES ABSOLUTE: 0.5 10'3/UL (ref 0.2–0.8)
MONOCYTES RELATIVE PERCENT: 16.4 % (ref 5.4–11.2)
NEUTROPHILS ABSOLUTE: 1.2 10'3/UL (ref 1.6–5)
NEUTROPHILS SEGMENTED: 42.8 % (ref 40.7–70)
PLATELET # BLD: 84 10'3/UL (ref 120–375)
PLATELET ESTIMATE: NORMAL
POTASSIUM SERPL-SCNC: 3.1 MMOL/L (ref 3.5–4.5)
RBC # BLD: 4.16 10'6/UL (ref 3.85–4.87)
SODIUM BLD-SCNC: 142 MMOL/L (ref 136–145)
TEAR DROP CELLS: NORMAL
TOTAL PROTEIN: 7.8 G/DL (ref 6.4–8.2)
WBC: 2.7 10'3/UL (ref 3.2–8.7)

## 2019-12-08 DIAGNOSIS — I10 ESSENTIAL HYPERTENSION: ICD-10-CM

## 2019-12-09 RX ORDER — CLONIDINE HYDROCHLORIDE 0.1 MG/1
TABLET ORAL
Qty: 30 TABLET | Refills: 3 | Status: SHIPPED | OUTPATIENT
Start: 2019-12-09 | End: 2020-03-14

## 2020-01-16 ENCOUNTER — TELEPHONE (OUTPATIENT)
Dept: FAMILY MEDICINE CLINIC | Age: 45
End: 2020-01-16

## 2020-01-16 RX ORDER — LOSARTAN POTASSIUM 100 MG/1
100 TABLET ORAL DAILY
Qty: 90 TABLET | Refills: 1 | Status: SHIPPED | OUTPATIENT
Start: 2020-01-16 | End: 2020-05-06

## 2020-01-16 RX ORDER — HYDROCHLOROTHIAZIDE 12.5 MG/1
12.5 TABLET ORAL DAILY
Qty: 90 TABLET | Refills: 3 | Status: SHIPPED | OUTPATIENT
Start: 2020-01-16 | End: 2020-05-06

## 2020-01-16 NOTE — TELEPHONE ENCOUNTER
Beverly Barbosa is calling to request a refill on the following medication(s):  Requested Prescriptions     Pending Prescriptions Disp Refills    losartan (COZAAR) 100 MG tablet 90 tablet 1     Sig: Take 1 tablet by mouth daily    hydrochlorothiazide (HYDRODIURIL) 12.5 MG tablet 90 tablet 3     Sig: Take 1 tablet by mouth daily       Last Visit Date (If Applicable):  Visit date not found    Next Visit Date:    Visit date not found

## 2020-03-14 RX ORDER — CLONIDINE HYDROCHLORIDE 0.1 MG/1
TABLET ORAL
Qty: 30 TABLET | Refills: 3 | Status: SHIPPED | OUTPATIENT
Start: 2020-03-14 | End: 2020-04-06

## 2020-03-14 RX ORDER — AMLODIPINE BESYLATE 10 MG/1
TABLET ORAL
Qty: 30 TABLET | Refills: 5 | Status: SHIPPED | OUTPATIENT
Start: 2020-03-14 | End: 2020-04-06

## 2020-03-14 RX ORDER — FEXOFENADINE HCL 180 MG
TABLET ORAL
Qty: 30 TABLET | Refills: 5 | Status: SHIPPED | OUTPATIENT
Start: 2020-03-14 | End: 2020-09-23 | Stop reason: SDUPTHER

## 2020-03-14 NOTE — TELEPHONE ENCOUNTER
Beverly Barbosa is calling to request a refill on the following medication(s):  Requested Prescriptions     Pending Prescriptions Disp Refills    cloNIDine (CATAPRES) 0.1 MG tablet [Pharmacy Med Name: CLONIDINE HCL 0.1 MG TABLET] 30 tablet 3     Sig: TAKE 1 TABLET ONCE A DAY IF NEEDED FOR BLOOD PRESSURE    amLODIPine (NORVASC) 10 MG tablet [Pharmacy Med Name: AMLODIPINE BESYLATE 10 MG TAB] 30 tablet 5     Sig: take 1 tablet by mouth at bedtime    RA ALLERGY RELIEF 180 MG tablet [Pharmacy Med Name: RA ALLERGY RELIEF 180 MG TAB] 30 tablet 5     Sig: take 1 tablet by mouth once daily       Last Visit Date (If Applicable):  2/21/7025    Next Visit Date:    Visit date not found

## 2020-05-06 ENCOUNTER — VIRTUAL VISIT (OUTPATIENT)
Dept: FAMILY MEDICINE CLINIC | Age: 45
End: 2020-05-06
Payer: COMMERCIAL

## 2020-05-06 VITALS — BODY MASS INDEX: 30.87 KG/M2 | HEIGHT: 68 IN

## 2020-05-06 PROCEDURE — 99213 OFFICE O/P EST LOW 20 MIN: CPT | Performed by: NURSE PRACTITIONER

## 2020-05-06 RX ORDER — PREDNISOLONE ACETATE 10 MG/ML
SUSPENSION/ DROPS OPHTHALMIC
COMMUNITY
Start: 2020-04-29 | End: 2020-07-01 | Stop reason: ALTCHOICE

## 2020-05-06 ASSESSMENT — PATIENT HEALTH QUESTIONNAIRE - PHQ9
SUM OF ALL RESPONSES TO PHQ QUESTIONS 1-9: 0
1. LITTLE INTEREST OR PLEASURE IN DOING THINGS: 0
2. FEELING DOWN, DEPRESSED OR HOPELESS: 0
SUM OF ALL RESPONSES TO PHQ QUESTIONS 1-9: 0
SUM OF ALL RESPONSES TO PHQ9 QUESTIONS 1 & 2: 0

## 2020-05-06 NOTE — PROGRESS NOTES
tablet by mouth at bedtime Yes Elton Mike MD   cloNIDine (CATAPRES) 0.1 MG tablet TAKE 1 TABLET ONCE A DAY IF NEEDED FOR BLOOD PRESSURE Yes Elton Mike MD   RA ALLERGY RELIEF 180 MG tablet take 1 tablet by mouth once daily Yes Elton Mike MD   losartan-hydrochlorothiazide (HYZAAR) 100-12.5 MG per tablet Take 1 tablet by mouth daily Yes Elton Mike MD   omeprazole (PRILOSEC) 20 MG delayed release capsule Take 20 mg by mouth daily as needed Yes Historical Provider, MD   pimecrolimus (ELIDEL) 1 % cream Elidel 1 % topical cream Yes Historical Provider, MD   Carbonyl Iron 25 MG TABS 3 tablets daily Yes Historical Provider, MD   nitroGLYCERIN (NITROSTAT) 0.4 MG SL tablet Place 0.4 mg under the tongue every 5 minutes as needed for Chest pain up to max of 3 total doses. If no relief after 1 dose, call 911. Yes Historical Provider, MD   prednisoLONE acetate (PRED FORTE) 1 % ophthalmic suspension   Historical Provider, MD       Health Maintenance   Topic Date Due    Cervical cancer screen  08/05/1994    Lipid screen  08/05/2013    HIV screen  01/16/2029 (Originally 8/5/1988)    Flu vaccine (Season Ended) 09/01/2020    Potassium monitoring  02/23/2021    Creatinine monitoring  02/23/2021    Hepatitis A vaccine  Aged Out    Hepatitis B vaccine  Aged Out    Hib vaccine  Aged Out    Meningococcal (ACWY) vaccine  Aged Out    Pneumococcal 0-64 years Vaccine  Aged Out        Review of Systems   Constitutional: Positive for fatigue (chronic, hx iron deficency anemia). Negative for chills and fever. HENT: Negative. Respiratory: Negative for cough, shortness of breath and wheezing. Cardiovascular: Negative for leg swelling. Gastrointestinal: Negative. Genitourinary: Negative. Musculoskeletal: Negative for back pain, gait problem, joint swelling and neck pain. Numbness to right leg. Psychiatric/Behavioral: Negative for sleep disturbance.      Vital Signs: (As obtained by

## 2020-05-10 PROBLEM — D50.9 IRON DEFICIENCY ANEMIA: Status: ACTIVE | Noted: 2020-05-10

## 2020-05-10 PROBLEM — R20.0 RIGHT LEG NUMBNESS: Status: ACTIVE | Noted: 2020-05-10

## 2020-05-10 PROBLEM — G47.33 OSA (OBSTRUCTIVE SLEEP APNEA): Status: ACTIVE | Noted: 2020-05-10

## 2020-05-10 PROBLEM — G93.32 CHRONIC FATIGUE SYNDROME: Status: ACTIVE | Noted: 2020-05-10

## 2020-05-10 PROBLEM — E55.9 VITAMIN D DEFICIENCY: Status: ACTIVE | Noted: 2020-05-10

## 2020-05-10 ASSESSMENT — ENCOUNTER SYMPTOMS
GASTROINTESTINAL NEGATIVE: 1
BACK PAIN: 0
WHEEZING: 0
COUGH: 0
SHORTNESS OF BREATH: 0

## 2020-05-19 ENCOUNTER — OFFICE VISIT (OUTPATIENT)
Dept: FAMILY MEDICINE CLINIC | Age: 45
End: 2020-05-19
Payer: COMMERCIAL

## 2020-05-19 ENCOUNTER — HOSPITAL ENCOUNTER (OUTPATIENT)
Age: 45
Setting detail: SPECIMEN
Discharge: HOME | End: 2020-05-19
Payer: COMMERCIAL

## 2020-05-19 VITALS
BODY MASS INDEX: 30.87 KG/M2 | HEART RATE: 74 BPM | SYSTOLIC BLOOD PRESSURE: 128 MMHG | HEIGHT: 68 IN | DIASTOLIC BLOOD PRESSURE: 84 MMHG

## 2020-05-19 LAB
CHOLESTEROL, FASTING: 166 MG/DL
CHOLESTEROL/HDL RATIO: 4.4
HDLC SERPL-MCNC: 38 MG/DL
LDL CHOLESTEROL: 106 MG/DL (ref 0–130)
TRIGLYCERIDE, FASTING: 112 MG/DL
VITAMIN B-12: 613 PG/ML (ref 232–1245)
VITAMIN D 25-HYDROXY: 17.9 NG/ML (ref 30–100)
VLDLC SERPL CALC-MCNC: ABNORMAL MG/DL (ref 1–30)

## 2020-05-19 PROCEDURE — 82306 VITAMIN D 25 HYDROXY: CPT

## 2020-05-19 PROCEDURE — 99214 OFFICE O/P EST MOD 30 MIN: CPT | Performed by: NURSE PRACTITIONER

## 2020-05-19 PROCEDURE — 36415 COLL VENOUS BLD VENIPUNCTURE: CPT

## 2020-05-19 PROCEDURE — 82607 VITAMIN B-12: CPT

## 2020-05-19 PROCEDURE — 80061 LIPID PANEL: CPT

## 2020-05-19 NOTE — PROGRESS NOTES
constipation and diarrhea. Endocrine: Negative for cold intolerance, heat intolerance, polydipsia, polyphagia and polyuria. Genitourinary: Negative. Musculoskeletal: Negative for arthralgias and myalgias. Skin: Negative. Allergic/Immunologic: Negative for environmental allergies and food allergies. Neurological: Positive for numbness (RLE). Negative for dizziness, weakness and headaches. Psychiatric/Behavioral: Negative for agitation, dysphoric mood and sleep disturbance. The patient is not nervous/anxious. Objective:     /84 (Site: Right Upper Arm, Position: Sitting)   Pulse 74   Ht 5' 8\" (1.727 m)   LMP 04/28/2020   BMI 30.87 kg/m²     Physical Exam  Constitutional:       Appearance: Normal appearance. She is well-developed and well-groomed. HENT:      Head: Normocephalic. Right Ear: Tympanic membrane and ear canal normal.      Left Ear: Tympanic membrane and ear canal normal.      Nose: Nose normal.      Mouth/Throat:      Mouth: Mucous membranes are moist.   Eyes:      Conjunctiva/sclera: Conjunctivae normal.      Pupils: Pupils are equal, round, and reactive to light. Neck:      Musculoskeletal: Neck supple. Thyroid: No thyromegaly. Cardiovascular:      Rate and Rhythm: Normal rate and regular rhythm. Heart sounds: Normal heart sounds. Pulmonary:      Effort: Pulmonary effort is normal.      Breath sounds: Normal breath sounds. No wheezing. Abdominal:      General: Bowel sounds are normal.      Palpations: Abdomen is soft. Tenderness: There is no abdominal tenderness. Musculoskeletal:      Right lower leg: No edema. Left lower leg: No edema. Lymphadenopathy:      Cervical: No cervical adenopathy. Skin:     Capillary Refill: Capillary refill takes less than 2 seconds. Neurological:      Mental Status: She is alert and oriented to person, place, and time. Cranial Nerves: Cranial nerves are intact. Sensory: Sensation is intact. Referral Reason:   Specialty Services Required     Requested Specialty:   Neurology     Number of Visits Requested:   1        Referral placed to neurologist for abnormal MRI. Will call with above lab results once received. All patient questions answered. Patient voiced understanding. Instructed to continue current medications, diet and exercise. Patient agreed with treatment plan. Follow up as directed. Return if symptoms worsen or fail to improve.     Electronically signed by ZOE Mckeon CNP on 5/22/2020

## 2020-05-20 RX ORDER — ERGOCALCIFEROL 1.25 MG/1
50000 CAPSULE ORAL WEEKLY
Qty: 4 CAPSULE | Refills: 1 | Status: SHIPPED | OUTPATIENT
Start: 2020-05-20 | End: 2020-07-14

## 2020-05-22 PROBLEM — R90.82 WHITE MATTER ABNORMALITY ON MRI OF BRAIN: Status: ACTIVE | Noted: 2020-05-22

## 2020-05-22 PROBLEM — H53.149 PHOTOPHOBIA: Status: ACTIVE | Noted: 2020-05-22

## 2020-05-22 ASSESSMENT — ENCOUNTER SYMPTOMS
PHOTOPHOBIA: 1
COUGH: 0
ABDOMINAL PAIN: 0
SHORTNESS OF BREATH: 0
WHEEZING: 0
CONSTIPATION: 0
DIARRHEA: 0

## 2020-05-27 ENCOUNTER — TELEPHONE (OUTPATIENT)
Dept: FAMILY MEDICINE CLINIC | Age: 45
End: 2020-05-27

## 2020-05-28 ENCOUNTER — TELEPHONE (OUTPATIENT)
Dept: FAMILY MEDICINE CLINIC | Age: 45
End: 2020-05-28

## 2020-07-01 ENCOUNTER — OFFICE VISIT (OUTPATIENT)
Dept: FAMILY MEDICINE CLINIC | Age: 45
End: 2020-07-01
Payer: COMMERCIAL

## 2020-07-01 VITALS
WEIGHT: 199 LBS | HEART RATE: 85 BPM | DIASTOLIC BLOOD PRESSURE: 88 MMHG | BODY MASS INDEX: 30.26 KG/M2 | OXYGEN SATURATION: 98 % | SYSTOLIC BLOOD PRESSURE: 146 MMHG

## 2020-07-01 LAB — HBA1C MFR BLD: 5.2 %

## 2020-07-01 PROCEDURE — 83036 HEMOGLOBIN GLYCOSYLATED A1C: CPT | Performed by: INTERNAL MEDICINE

## 2020-07-01 PROCEDURE — 99214 OFFICE O/P EST MOD 30 MIN: CPT | Performed by: INTERNAL MEDICINE

## 2020-07-01 RX ORDER — FERROUS SULFATE 325(65) MG
325 TABLET ORAL 2 TIMES DAILY
COMMUNITY
Start: 2020-06-20

## 2020-07-01 RX ORDER — NAPROXEN 500 MG/1
500 TABLET ORAL 2 TIMES DAILY PRN
Qty: 60 TABLET | Refills: 0 | Status: SHIPPED | OUTPATIENT
Start: 2020-07-01 | End: 2021-08-09 | Stop reason: ALTCHOICE

## 2020-07-01 RX ORDER — GABAPENTIN 300 MG/1
300 CAPSULE ORAL 3 TIMES DAILY
Qty: 180 CAPSULE | Refills: 0 | Status: SHIPPED | OUTPATIENT
Start: 2020-07-01 | End: 2022-05-17

## 2020-07-01 ASSESSMENT — ENCOUNTER SYMPTOMS
RHINORRHEA: 0
SINUS PAIN: 0
SORE THROAT: 0
SHORTNESS OF BREATH: 0
BLOOD IN STOOL: 0
DIARRHEA: 0
CHEST TIGHTNESS: 0
TROUBLE SWALLOWING: 0
COUGH: 0
ABDOMINAL PAIN: 0

## 2020-07-01 NOTE — PROGRESS NOTES
[address]  [address]  Dept: [phone]  Dept Fax: [phone]: [phone]     Visit Date:  2020    Patient:  Caden De Souza  YOB: 1975    HPI:   Caden De Souza presents today for   Chief Complaint   Patient presents with    Other     patient is here today for leg numbness in her right leg and in her right big toe that started around two weeks ago. patient states that it is extremly painful when walking on it. Collette Ruts HPI 49-year-old female with a history of hypertension, iron deficiency anemia on chronic iron infusions, history of lymphopenia with thrombocytopenia, history of impaired fasting glucose, photophobia, vitamin D deficiency is coming in for right big toe pain as well as left leg thigh numbness. Patient reports 2-month history of worsening numbness that started in her left leg area which has expanded over the thigh with some paresthesias but no pain. Lately she has noticed pain in her right big toe especially around the tip of the toe that has been aching a lot and is limiting her daily functional activities. She also started to cry during the encounter because the pain was excruciating and is affecting her on daily basis. She denies any trauma injuries or lower back issues and no history of gout or pseudogout or any erosive arthritis. Based on chart review looks like she is seeing a neurologist for white matter lesions that was noted on her MRI of the brain and she is getting further studies done. She does report earlier she had similar kind of left big toe pain which was not as intense as this one. This pain seems to be worse than her  section she reports. No h/o of B12 def/Diabetes. Persistent chronic photophobia and loss of peripheral vision in both of her eyes left eye is worse than her right eye.   She also seems concern for possible diabetes and A1c was done today which was 5.2. No pins or needles like sensations or tingling or weakness in her right leg or extremity or toes mostly the pain versus its left leg/thigh numbness. No Open sores wounds ulcers or any history of intermittent claudication-like symptoms or PAD. No smoking no drinking no drugs. No rashes no rheumatological diseases or erosive arthritis-like diseases in the family. Medications  Prior to Visit Medications    Medication Sig Taking? Authorizing Provider   ferrous sulfate (IRON 325) 325 (65 Fe) MG tablet 325 mg 2 times daily Yes Historical Provider, MD   gabapentin (NEURONTIN) 300 MG capsule Take 1 capsule by mouth 3 times daily for 180 days. Intended supply: 90 days Yes Moody Blum MD   naproxen (NAPROSYN) 500 MG tablet Take 1 tablet by mouth 2 times daily as needed for Pain Take one po BID. Take with food. Yes Moody Blum MD   vitamin D (ERGOCALCIFEROL) 1.25 MG (76265 UT) CAPS capsule Take 1 capsule by mouth once a week Yes ZOE Jeffers - CNP   amLODIPine (NORVASC) 10 MG tablet take 1 tablet by mouth at bedtime Yes Angel Brown MD   cloNIDine (CATAPRES) 0.1 MG tablet TAKE 1 TABLET ONCE A DAY IF NEEDED FOR BLOOD PRESSURE Yes Angel Brown MD   RA ALLERGY RELIEF 180 MG tablet take 1 tablet by mouth once daily Yes Angel Brown MD   losartan-hydrochlorothiazide [facility]) 100-12.5 MG per tablet Take 1 tablet by mouth daily Yes Angel Brown MD   nitroGLYCERIN (NITROSTAT) 0.4 MG SL tablet Place 0.4 mg under the tongue every 5 minutes as needed for Chest pain up to max of 3 total doses. If no relief after 1 dose, call 911. Yes Historical Provider, MD   omeprazole (PRILOSEC) 20 MG delayed release capsule Take 20 mg by mouth daily as needed  Historical Provider, MD   pimecrolimus (ELIDEL) 1 % cream Elidel 1 % topical cream  Historical Provider, MD        Allergies:  is allergic to dexlansoprazole and tetanus toxoid.      Past Medical History:   has a past medical history of Chronic fatigue, Hypertension, Iron deficiency anemia, and CALLY (obstructive sleep apnea). Past Surgical History   has a past surgical history that includes lymph node biopsy. Family History  family history is not on file. Social History   reports that she has never smoked. She has never used smokeless tobacco.    Health Maintenance:    Health Maintenance   Topic Date Due    Cervical cancer screen  08/05/1994    HIV screen  01/16/2029 (Originally 8/5/1988)    Flu vaccine (1) 09/01/2020    Potassium monitoring  02/23/2021    Creatinine monitoring  02/23/2021    Lipid screen  05/19/2025    Hepatitis A vaccine  Aged Out    Hepatitis B vaccine  Aged Out    Hib vaccine  Aged Out    Meningococcal (ACWY) vaccine  Aged Out    Pneumococcal 0-64 years Vaccine  Aged Out       Subjective:      Review of Systems   Constitutional: Negative for fatigue, fever and unexpected weight change. HENT: Negative for ear pain, postnasal drip, rhinorrhea, sinus pain, sore throat and trouble swallowing. Eyes: Negative for visual disturbance. Respiratory: Negative for cough, chest tightness and shortness of breath. Cardiovascular: Negative for chest pain and leg swelling. Gastrointestinal: Negative for abdominal pain, blood in stool and diarrhea. Endocrine: Negative for polyuria. Genitourinary: Negative for difficulty urinating and flank pain. Musculoskeletal: Positive for arthralgias. Negative for joint swelling and myalgias. Left big toe pain   Skin: Negative for rash. Allergic/Immunologic: Negative for environmental allergies. Neurological: Positive for numbness. Negative for dizziness, tremors, seizures, syncope, facial asymmetry, speech difficulty, weakness, light-headedness and headaches. Hematological: Negative for adenopathy. Psychiatric/Behavioral: Negative for behavioral problems and suicidal ideas. The patient is not nervous/anxious. Monofilament skin testing was normal.    Psychiatric:         Mood and Affect: Mood normal.             Assessment       Diagnosis Orders   1. Essential hypertension     2. Iron deficiency anemia, unspecified iron deficiency anemia type     3. Vitamin D deficiency     4. White matter abnormality on MRI of brain     5. Photophobia     6. Thrombocytopenia (HCC)     7. Lymphocytopenia     8. Great toe pain, left  XR FOOT LEFT (MIN 3 VIEWS)    gabapentin (NEURONTIN) 300 MG capsule    naproxen (NAPROSYN) 500 MG tablet    POCT glycosylated hemoglobin (Hb A1C)   9. Impaired fasting glucose  POCT glycosylated hemoglobin (Hb A1C)         PLAN     Gabapentin as well as naproxen as needed for the pain. We will obtain some x-rays of the foot as well. Follow-up in couple of weeks. Orders Placed This Encounter   Procedures    XR FOOT LEFT (MIN 3 VIEWS)     Standing Status:   Future     Standing Expiration Date:   7/1/2021     Order Specific Question:   Reason for exam:     Answer:   left great toe pain.  POCT glycosylated hemoglobin (Hb A1C)        Return in about 2 weeks (around 7/15/2020). Patient given educational materials - see patient instructions. Discussed use, benefit, and side effects of prescribed medications. All patient questions answered. Pt voiced understanding. Reviewed health maintenance.        Electronically signed Mike Ortega MD on 7/1/2020 at 12:30 PM EDT

## 2020-07-01 NOTE — PATIENT INSTRUCTIONS
Patient Education        Bones of the Foot: Anatomy Sketch    Current as of: March 2, 2020               Content Version: 12.5  © 6767-2526 [employer]. Care instructions adapted under license by [facility] ([facility]). If you have questions about a medical condition or this instruction, always ask your healthcare professional. [place] [address] any warranty or liability for your use of this information.

## 2020-07-14 RX ORDER — ERGOCALCIFEROL 1.25 MG/1
50000 CAPSULE ORAL WEEKLY
Qty: 4 CAPSULE | Refills: 1 | Status: SHIPPED | OUTPATIENT
Start: 2020-07-14 | End: 2020-09-08

## 2020-07-14 NOTE — TELEPHONE ENCOUNTER
Alcira Trevizo is requesting a refill on the following medication(s):  Requested Prescriptions     Pending Prescriptions Disp Refills    vitamin D (ERGOCALCIFEROL) 1.25 MG (62288 UT) CAPS capsule [Pharmacy Med Name: VITAMIN D2 1.25MG(50,000 UNIT)] 4 capsule 1     Sig: TAKE 1 CAPSULE BY MOUTH ONCE A WEEK       Last Visit Date (If Applicable):  4/4/4439    Next Visit Date:    Visit date not found

## 2020-07-22 ENCOUNTER — OFFICE VISIT (OUTPATIENT)
Dept: FAMILY MEDICINE CLINIC | Age: 45
End: 2020-07-22
Payer: COMMERCIAL

## 2020-07-22 VITALS
HEART RATE: 66 BPM | SYSTOLIC BLOOD PRESSURE: 172 MMHG | BODY MASS INDEX: 30.56 KG/M2 | DIASTOLIC BLOOD PRESSURE: 112 MMHG | WEIGHT: 201 LBS | OXYGEN SATURATION: 98 %

## 2020-07-22 PROCEDURE — 99214 OFFICE O/P EST MOD 30 MIN: CPT | Performed by: FAMILY MEDICINE

## 2020-07-22 RX ORDER — DULOXETIN HYDROCHLORIDE 30 MG/1
30 CAPSULE, DELAYED RELEASE ORAL DAILY
Qty: 30 CAPSULE | Refills: 0 | Status: SHIPPED | OUTPATIENT
Start: 2020-07-22 | End: 2020-10-22

## 2020-07-22 RX ORDER — DULOXETIN HYDROCHLORIDE 60 MG/1
60 CAPSULE, DELAYED RELEASE ORAL DAILY
Qty: 30 CAPSULE | Refills: 3 | Status: SHIPPED | OUTPATIENT
Start: 2020-07-22 | End: 2020-10-22

## 2020-07-22 NOTE — PATIENT INSTRUCTIONS
Get the labs from [address] [place] [address] Way lab draw as well as the [place], EEG, KAMRYN results when available.

## 2020-07-22 NOTE — PROGRESS NOTES
[address] [address] [facility], [address] [place] [address]  Dept: [phone]  Dept LKS:[phone]    Flori Bragg is a 53 y.o. female who presents today for her medical conditions/complaints as notedbelow. Flori Bragg is c/o of Numbness (right leg numbness - has been going on for months and is really bothering her. She has been seeing a neurologist but they have not found anything. )      HPI:     HPI    Has had vague right leg numbness for weeks, sometimes cannot feel the weight of it, has hypersensitivity and discomfort with this as well. No issues with driving or long walks at all with the leg use. Gabapentin has helped but makes her sleepy, only uses at night. Has had a loss of appetite for the last few weeks as well. Has a family history of Lupus no other blood or rheumatology disease. Sabino Sandhu had CAD in his 52's. The neurologist did see a drop in her left visual field on the left upper outer. She is continue to see a neurologist.  She did see a neuro-ophthalmologist who verified her vision loss he referred her back to neurology with the recommendation for testing for lupus. LYME TITER IGG & IGM (SCREEN)    Pending Test MRI Cervical Spine w/wo contrast    Pending Test SSA and SSB (SJOGREN'S ABS SS-A & SS-B)    Pending Test LUPUS (12) PANEL - done at [facility] (2 weeks ago)   Future/Pending Procedure CORAZON - 8/15   Future/Pending Procedure KAMRYN 8/15   Future/Pending Procedure EEG Routine -- Done at [facility]     Has the follow up with neurology on Aug 4th.      BP Readings from Last 3 Encounters:   07/22/20 (!) 172/112   07/01/20 (!) 146/88   05/19/20 128/84          (goal 120/80)    Wt Readings from Last 3 Encounters:   07/22/20 201 lb (91.2 kg)   07/01/20 199 lb (90.3 kg)   09/19/19 203 lb (92.1 kg)        Past Medical History:   Diagnosis Date    Chronic fatigue     Hypertension     Iron deficiency anemia     CALLY (obstructive sleep apnea)       Past Surgical History:   Procedure Laterality Date    LYMPH NODE BIOPSY         No family history on file. Social History     Tobacco Use    Smoking status: Never Smoker    Smokeless tobacco: Never Used   Substance Use Topics    Alcohol use: Not on file      Current Outpatient Medications   Medication Sig Dispense Refill    DULoxetine (CYMBALTA) 30 MG extended release capsule Take 1 capsule by mouth daily 30 capsule 0    DULoxetine (CYMBALTA) 60 MG extended release capsule Take 1 capsule by mouth daily 30 capsule 3    vitamin D (ERGOCALCIFEROL) 1.25 MG (10926 UT) CAPS capsule TAKE 1 CAPSULE BY MOUTH ONCE A WEEK 4 capsule 1    ferrous sulfate (IRON 325) 325 (65 Fe) MG tablet 325 mg 2 times daily      gabapentin (NEURONTIN) 300 MG capsule Take 1 capsule by mouth 3 times daily for 180 days. Intended supply: 90 days (Patient taking differently: Take 300 mg by mouth daily. Intended supply: 90 days) 180 capsule 0    naproxen (NAPROSYN) 500 MG tablet Take 1 tablet by mouth 2 times daily as needed for Pain Take one po BID. Take with food. 60 tablet 0    amLODIPine (NORVASC) 10 MG tablet take 1 tablet by mouth at bedtime 30 tablet 0    cloNIDine (CATAPRES) 0.1 MG tablet TAKE 1 TABLET ONCE A DAY IF NEEDED FOR BLOOD PRESSURE 30 tablet 0    RA ALLERGY RELIEF 180 MG tablet take 1 tablet by mouth once daily 30 tablet 5    losartan-hydrochlorothiazide (HYZAAR) 100-12.5 MG per tablet Take 1 tablet by mouth daily 90 tablet 3    omeprazole (PRILOSEC) 20 MG delayed release capsule Take 20 mg by mouth daily as needed      pimecrolimus (ELIDEL) 1 % cream Elidel 1 % topical cream      nitroGLYCERIN (NITROSTAT) 0.4 MG SL tablet Place 0.4 mg under the tongue every 5 minutes as needed for Chest pain up to max of 3 total doses. If no relief after 1 dose, call 911. No current facility-administered medications for this visit.       Allergies   Allergen Reactions    Dexlansoprazole     Tetanus Toxoid        [place] Maintenance   Topic Date Due    Cervical cancer screen  08/05/1994    HIV screen  01/16/2029 (Originally 8/5/1988)    Flu vaccine (1) 09/01/2020    Potassium monitoring  02/23/2021    Creatinine monitoring  02/23/2021    Lipid screen  05/19/2025    Hepatitis A vaccine  Aged Out    Hepatitis B vaccine  Aged Out    Hib vaccine  Aged Out    Meningococcal (ACWY) vaccine  Aged Out    Pneumococcal 0-64 years Vaccine  Aged Out       Subjective:      Review of Systems    Objective:     BP (!) 172/112 (Site: Right Upper Arm, Position: Sitting, Cuff Size: Large Adult)   Pulse 66   Wt 201 lb (91.2 kg)   SpO2 98%   BMI 30.56 kg/m²     Physical Exam  Constitutional:       Appearance: Normal appearance. She is well-developed and well-groomed. HENT:      Head: Normocephalic. Right Ear: Tympanic membrane and ear canal normal.      Left Ear: Tympanic membrane and ear canal normal.      Nose: Nose normal.      Mouth/Throat:      Mouth: Mucous membranes are moist.   Eyes:      Conjunctiva/sclera: Conjunctivae normal.      Pupils: Pupils are equal, round, and reactive to light. Neck:      Musculoskeletal: Neck supple. Thyroid: No thyromegaly. Cardiovascular:      Rate and Rhythm: Normal rate and regular rhythm. Heart sounds: Normal heart sounds. Pulmonary:      Effort: Pulmonary effort is normal.      Breath sounds: Normal breath sounds. No wheezing. Abdominal:      General: Bowel sounds are normal.      Palpations: Abdomen is soft. Tenderness: There is no abdominal tenderness. Musculoskeletal:      Right lower leg: No edema. Left lower leg: No edema. Lymphadenopathy:      Cervical: No cervical adenopathy. Skin:     Capillary Refill: Capillary refill takes less than 2 seconds. Comments: Normal strength normal DTR in the LE. Decreased sensation as indicated. Neurological:      Mental Status: She is alert and oriented to person, place, and time.       Cranial Nerves: Cranial nerves are intact. Sensory: Sensory deficit (She does have diminished sensation to light touch in a nondermatomal fashion on the right lower leg, primarily outer lower but also medial upper bilaterally) present. Motor: No weakness. Coordination: Romberg sign negative. Coordination normal. Finger-Nose-Finger Test and Heel to [facility] Test normal.      Gait: Gait and tandem walk normal.   Psychiatric:         Behavior: Behavior is cooperative. Assessment/Plan:      Diagnosis Orders   1. Right leg numbness  CEDRIC Screen With Reflex    C3 Complement    C4 Complement    Sedimentation Rate    C-Reactive Protein   2. Iron deficiency anemia, unspecified iron deficiency anemia type     3. White matter abnormality on MRI of brain  CEDRIC Screen With Reflex    C3 Complement    C4 Complement    Sedimentation Rate    C-Reactive Protein   4. Visual field defect  CEDRIC Screen With Reflex    C3 Complement    C4 Complement    Sedimentation Rate    C-Reactive Protein   5. Pancytopenia (HCC)  CEDRIC Screen With Reflex    C3 Complement    C4 Complement    Sedimentation Rate    C-Reactive Protein     Encouraged Salud Sharma to continue with her neurology work-up. Further labs were ordered today based on the results of the previous labs that neurology ordered. The lupus panel was not completed by the lab only a lupus anticoagulant was ordered. This was negative. We will give a trial to duloxetine for her leg numbness. Start at the 30 mg dose and increase to the 60 mg dose in 1 to 2 weeks. Get the labs from [address] lab draw as well as the [place], EEG, KAMRYN results when available.        Lab Results   Component Value Date    WBC 2.6 (L) 02/23/2020    HGB 12.6 02/23/2020    HCT 36.5 02/23/2020    PLT 76 (L) 02/23/2020    HDL 38 (L) 05/19/2020    ALT 15 11/19/2019    AST 20 11/19/2019     02/23/2020    K 3.5 02/23/2020     02/23/2020    CREATININE 0.80 02/23/2020    BUN 7 02/23/2020    CO2 24 02/23/2020 GLUF 97 03/15/2019    LABA1C 5.2 07/01/2020       Return in about 3 months (around 10/22/2020) for Medication recheck. Patient given educational materials - see patientinstructions. Discussed use, benefit, and side effects of prescribed medications. All patient questions answered. Pt voiced understanding. Reviewed health maintenance. Instructed to continue current medications, diet andexercise. Patient agreed with treatment plan. Follow up as directed.      Electronically signed by Kecia Hebert MD on 7/26/2020

## 2020-07-24 ENCOUNTER — HOSPITAL ENCOUNTER (OUTPATIENT)
Age: 45
Setting detail: SPECIMEN
Discharge: HOME | End: 2020-07-24
Payer: COMMERCIAL

## 2020-07-24 LAB — SEDIMENTATION RATE, ERYTHROCYTE: 18 MM (ref 0–20)

## 2020-07-24 PROCEDURE — 85651 RBC SED RATE NONAUTOMATED: CPT

## 2020-07-24 PROCEDURE — 86225 DNA ANTIBODY NATIVE: CPT

## 2020-07-24 PROCEDURE — 86235 NUCLEAR ANTIGEN ANTIBODY: CPT

## 2020-07-24 PROCEDURE — 86038 ANTINUCLEAR ANTIBODIES: CPT

## 2020-07-24 PROCEDURE — 86140 C-REACTIVE PROTEIN: CPT

## 2020-07-24 PROCEDURE — 36415 COLL VENOUS BLD VENIPUNCTURE: CPT

## 2020-07-24 PROCEDURE — 86160 COMPLEMENT ANTIGEN: CPT

## 2020-07-25 LAB
C-REACTIVE PROTEIN: 0.4 MG/L (ref 0–5)
COMPLEMENT C3: 137 MG/DL (ref 90–180)
COMPLEMENT C4: 28 MG/DL (ref 10–40)

## 2020-07-27 LAB
ANA REFERENCE RANGE:: ABNORMAL
ANTI DNA DOUBLE STRANDED: 36 IU/ML
ANTI JO-1 IGG: 21 U/ML
ANTI RNP AB: 182 U/ML
ANTI SSA: 23 U/ML
ANTI SSB: 22 U/ML
ANTI-CENTROMERE: 10 U/ML
ANTI-NUCLEAR ANTIBODY (ANA): POSITIVE
ANTI-SCLERODERMA: 10 U/ML
ANTI-SMITH: 47 U/ML
HISTONE ANTIBODY: 96 U/ML

## 2020-09-08 RX ORDER — ERGOCALCIFEROL 1.25 MG/1
50000 CAPSULE ORAL WEEKLY
Qty: 4 CAPSULE | Refills: 1 | Status: SHIPPED | OUTPATIENT
Start: 2020-09-08 | End: 2020-11-07

## 2020-09-08 NOTE — TELEPHONE ENCOUNTER
Sudha Webb is requesting a refill on the following medication(s):  Requested Prescriptions     Pending Prescriptions Disp Refills    vitamin D (ERGOCALCIFEROL) 1.25 MG (02331 UT) CAPS capsule [Pharmacy Med Name: VITAMIN D2 1.25MG(50,000 UNIT)] 4 capsule 1     Sig: TAKE 1 CAPSULE BY MOUTH ONCE A WEEK       Last Visit Date (If Applicable):  1/03/4818    Next Visit Date:    10/22/2020

## 2020-09-23 RX ORDER — FEXOFENADINE HCL 180 MG/1
TABLET ORAL
Qty: 30 TABLET | Refills: 5 | Status: SHIPPED | OUTPATIENT
Start: 2020-09-23 | End: 2021-03-22

## 2020-10-09 RX ORDER — CARBAMAZEPINE 200 MG/1
1 TABLET ORAL DAILY
COMMUNITY
Start: 2020-08-24 | End: 2020-10-22 | Stop reason: SINTOL

## 2020-10-13 RX ORDER — AMLODIPINE BESYLATE 10 MG/1
10 TABLET ORAL DAILY
Qty: 30 TABLET | Refills: 0 | Status: SHIPPED | OUTPATIENT
Start: 2020-10-13 | End: 2020-10-22 | Stop reason: SDUPTHER

## 2020-10-13 NOTE — TELEPHONE ENCOUNTER
Meryle Littler is calling to request a refill on the following medication(s):  Requested Prescriptions     Pending Prescriptions Disp Refills    amLODIPine (NORVASC) 10 MG tablet 30 tablet 0     Sig: Take 1 tablet by mouth daily       Last Visit Date (If Applicable):  1/46/4571    Next Visit Date:    10/22/2020

## 2020-10-22 ENCOUNTER — OFFICE VISIT (OUTPATIENT)
Dept: FAMILY MEDICINE CLINIC | Age: 45
End: 2020-10-22
Payer: COMMERCIAL

## 2020-10-22 VITALS
HEART RATE: 71 BPM | OXYGEN SATURATION: 98 % | BODY MASS INDEX: 30.61 KG/M2 | WEIGHT: 201.3 LBS | SYSTOLIC BLOOD PRESSURE: 160 MMHG | DIASTOLIC BLOOD PRESSURE: 102 MMHG

## 2020-10-22 PROCEDURE — 99214 OFFICE O/P EST MOD 30 MIN: CPT | Performed by: NURSE PRACTITIONER

## 2020-10-22 RX ORDER — CLONIDINE HYDROCHLORIDE 0.1 MG/1
0.1 TABLET ORAL DAILY PRN
Qty: 30 TABLET | Refills: 3 | Status: SHIPPED | OUTPATIENT
Start: 2020-10-22 | End: 2021-02-15

## 2020-10-22 RX ORDER — AMLODIPINE BESYLATE 10 MG/1
10 TABLET ORAL DAILY
Qty: 90 TABLET | Refills: 3 | Status: SHIPPED | OUTPATIENT
Start: 2020-10-22 | End: 2021-04-27

## 2020-10-22 RX ORDER — LOSARTAN POTASSIUM AND HYDROCHLOROTHIAZIDE 12.5; 1 MG/1; MG/1
1 TABLET ORAL DAILY
Qty: 90 TABLET | Refills: 3 | Status: SHIPPED | OUTPATIENT
Start: 2020-10-22 | End: 2021-01-04 | Stop reason: SDUPTHER

## 2020-10-22 RX ORDER — LOSARTAN POTASSIUM 100 MG/1
TABLET ORAL
COMMUNITY
Start: 2020-10-01 | End: 2020-10-22

## 2020-10-22 NOTE — PROGRESS NOTES
[facility]  [address] Replaced by [facility]  Dept: [phone]  Dept Fax: [phone]    Miguelito Dawson is a 50 y.o. female who presents today for her medical conditions/complaints as noted below. Miguelito Dawson c/o of 3 Month Follow-Up (reports she just \"feels like crap\" says she has had some dizziness, bruising, also has some swelling on left side of neck, appetite is down, also reports was told has a spot on brain but not cancerous) and Hypertension      HPI:   Patient presents to the office for routine follow-up. She has a history of hypertension, iron deficiency anemia with occasional iron infusions, impaired fasting glucose, fatigue, photophobia. She has not been following with her hematologist and is requesting referral to a new hematologist.  She follows with neurology (Dr. Gerber Degroot). Review of notes indicates patient was diagnosed with lupus. Patient states she does not have lupus. She also reports work-up with previous neurologist many years ago as a child and was diagnosed with lupus but she does not feel her symptoms are related. She reports \"feeling like crap, tired, sleepy with no motivation to do anything\". She previously stated she did not have sleep apnea, however today she reports needing a referral to pulmonologist so she can follow-up with her sleep study. She did not follow-up previously due to insurance issues. She has not been monitoring her blood pressure. She also has not been taking all of her blood pressure medications, specifically the losartan-hydrochlorothiazide 100- 12.5 mg daily. Hypertension   This is a chronic problem. The current episode started more than 1 year ago. The problem has been waxing and waning since onset. The problem is uncontrolled. Associated symptoms include malaise/fatigue.  Pertinent negatives include no anxiety, blurred vision, chest pain, headaches, orthopnea, palpitations, peripheral edema or shortness of breath. There are no associated agents to hypertension. Risk factors for coronary artery disease include family history, sedentary lifestyle and stress. Past treatments include diuretics, calcium channel blockers and angiotensin blockers. The current treatment provides moderate improvement. Compliance problems include exercise. Identifiable causes of hypertension include sleep apnea. Neurologist: Dr Og Martinez  Rheumatologist: Dr. Cardenas [facility]  ophthalmologist:     BP Readings from Last 3 Encounters:   10/22/20 (!) 160/102   07/22/20 (!) 172/112   07/01/20 (!) 146/88              ([id number])    Pulse Readings from Last 3 Encounters:   10/22/20 71   07/22/20 66   07/01/20 85        Wt Readings from Last 3 Encounters:   10/22/20 201 lb 4.8 oz (91.3 kg)   07/22/20 201 lb (91.2 kg)   07/01/20 199 lb (90.3 kg)       Past Medical History:   Diagnosis Date    Chronic fatigue     Hypertension     Iron deficiency anemia     CALLY (obstructive sleep apnea)       Past Surgical History:   Procedure Laterality Date    LYMPH NODE BIOPSY         No family history on file.     Social History     Tobacco Use    Smoking status: Never Smoker    Smokeless tobacco: Never Used   Substance Use Topics    Alcohol use: Not on file      Current Outpatient Medications   Medication Sig Dispense Refill    amLODIPine (NORVASC) 10 MG tablet Take 1 tablet by mouth daily 90 tablet 3    cloNIDine (CATAPRES) 0.1 MG tablet Take 1 tablet by mouth daily as needed for High Blood Pressure SBP >160 30 tablet 3    losartan-hydroCHLOROthiazide (HYZAAR) 100-12.5 MG per tablet Take 1 tablet by mouth daily 90 tablet 3    fexofenadine (RA ALLERGY RELIEF) 180 MG tablet take 1 tablet by mouth once daily 30 tablet 5    vitamin D (ERGOCALCIFEROL) 1.25 MG (31021 UT) CAPS capsule TAKE 1 CAPSULE BY MOUTH ONCE A WEEK 4 capsule 1    ferrous sulfate (IRON 325) 325 (65 Fe) MG tablet 325 mg 2 times daily      gabapentin (NEURONTIN) 300 MG capsule Take 1 capsule by mouth 3 times daily for 180 days. Intended supply: 90 days (Patient taking differently: Take 300 mg by mouth daily. Intended supply: 90 days) 180 capsule 0    naproxen (NAPROSYN) 500 MG tablet Take 1 tablet by mouth 2 times daily as needed for Pain Take one po BID. Take with food. 60 tablet 0    omeprazole (PRILOSEC) 20 MG delayed release capsule Take 20 mg by mouth daily as needed      pimecrolimus (ELIDEL) 1 % cream Elidel 1 % topical cream       No current facility-administered medications for this visit. Allergies   Allergen Reactions    Dexlansoprazole     Tetanus Toxoid        Health Maintenance   Topic Date Due    Cervical cancer screen  08/05/1994    Flu vaccine (1) 09/01/2020    HIV screen  01/16/2029 (Originally 8/5/1988)    Potassium monitoring  02/23/2021    Creatinine monitoring  02/23/2021    Lipid screen  05/19/2025    Hepatitis A vaccine  Aged Out    Hepatitis B vaccine  Aged Out    Hib vaccine  Aged Out    Meningococcal (ACWY) vaccine  Aged Out    Pneumococcal 0-64 years Vaccine  Aged Out       Subjective:      Review of Systems   Constitutional: Positive for appetite change (decreased), fatigue and malaise/fatigue. Negative for chills and fever. HENT: Negative. Eyes: Positive for photophobia (improved) and visual disturbance (left). Negative for blurred vision. Respiratory: Negative for cough, shortness of breath and wheezing. Cardiovascular: Negative for chest pain, palpitations, orthopnea and leg swelling. Gastrointestinal: Negative for abdominal pain, constipation and diarrhea. Endocrine: Negative for cold intolerance, heat intolerance, polydipsia, polyphagia and polyuria. Genitourinary: Negative. Musculoskeletal: Negative for arthralgias and myalgias. Skin: Negative. Negative for rash. vitiligo   Allergic/Immunologic: Negative for environmental allergies and food allergies.    Neurological: Positive for dizziness and numbness Moderate depression, 15-19 = Moderately severe depression, 20-27 = Severe depression     Lab Results   Component Value Date     02/23/2020    K 3.5 02/23/2020     02/23/2020    CO2 24 02/23/2020    BUN 7 02/23/2020    CREATININE 0.80 02/23/2020    GLUCOSE 95 02/23/2020    CALCIUM 9.2 02/23/2020    PROT 7.8 11/19/2019    LABALBU 4.3 11/19/2019    BILITOT 0.5 11/19/2019    ALKPHOS 59 11/19/2019    AST 20 11/19/2019    ALT 15 11/19/2019    LABGLOM > 60 02/23/2020    AGRATIO 1.2 09/16/2019    GLOB 3.5 09/16/2019     Lab Results   Component Value Date    LABA1C 5.2 07/01/2020       Lab Results   Component Value Date    GLUF 97 03/15/2019    CREATININE 0.80 02/23/2020       Assessment:     1. Essential hypertension    2. CALLY (obstructive sleep apnea)    3. Systemic lupus erythematosus, unspecified SLE type, unspecified organ involvement status ([facility] [address]    4. Chronic fatigue syndrome    5. Vitamin D deficiency    6. Photophobia    7. White matter abnormality on MRI of brain    8. Iron deficiency anemia, unspecified iron deficiency anemia type    9.  Vitiligo          Plan:     Orders Placed This Encounter   Procedures   Julia Martin MD, Hematology/Oncology, [place]     Referral Priority:   Routine     Referral Type:   Eval and Treat     Referral Reason:   Specialty Services Required     Referred to Provider:   Steva Spatz, MD     Requested Specialty:   Hematology and Oncology     Number of Visits Requested:   1    External Referral To Pulmonology     Referral Priority:   Routine     Referral Type:   Eval and Treat     Referral Reason:   Specialty Services Required     Requested Specialty:   Pulmonology     Number of Visits Requested:   1       Orders Placed This Encounter   Medications    amLODIPine (NORVASC) 10 MG tablet     Sig: Take 1 tablet by mouth daily     Dispense:  90 tablet     Refill:  3    cloNIDine (CATAPRES) 0.1 MG tablet     Sig: Take 1 tablet by mouth daily as needed for High Blood Pressure SBP >160     Dispense:  30 tablet     Refill:  3    losartan-hydroCHLOROthiazide (HYZAAR) 100-12.5 MG per tablet     Sig: Take 1 tablet by mouth daily     Dispense:  90 tablet     Refill:  3      Instructed to continue all follow-ups with neurologist, rheumatologist, neuro ophthalmologist.  Discussed the importance of taking all medications as prescribed. Patient reports she will start taking her blood pressure medications once they are refilled. Instructed to monitor blood pressure at home and keep log to bring to next follow-up appointment for review. Referral placed to hematology, and pulmonology. Discussed use, benefit, and side effects of prescribed medications. All patient questions answered. Patient voiced understanding. Health Maintenance reviewed. Instructed to continue current medications, diet and exercise. Patient agreed with treatment plan. Follow up as directed. Return in about 2 weeks (around 11/5/2020) for HTN.     Electronically signed by ZOE Angel CNP on 10/30/2020

## 2020-10-30 PROBLEM — M32.9 SYSTEMIC LUPUS ERYTHEMATOSUS (HCC): Status: ACTIVE | Noted: 2020-10-30

## 2020-10-30 ASSESSMENT — ENCOUNTER SYMPTOMS
WHEEZING: 0
COUGH: 0
CONSTIPATION: 0
SHORTNESS OF BREATH: 0
BLURRED VISION: 0
ABDOMINAL PAIN: 0
PHOTOPHOBIA: 1
ORTHOPNEA: 0
DIARRHEA: 0
ROS SKIN COMMENTS: VITILIGO

## 2020-11-05 NOTE — TELEPHONE ENCOUNTER
Shaheen Taylor is calling to request a refill on the following medication(s):  Requested Prescriptions     Pending Prescriptions Disp Refills    vitamin D (ERGOCALCIFEROL) 1.25 MG (18935 UT) CAPS capsule [Pharmacy Med Name: VITAMIN D2 1.25MG(50,000 UNIT)] 4 capsule 1     Sig: take 1 capsule by mouth every week       Last Visit Date (If Applicable):  98/52/7666    Next Visit Date:    Visit date not found

## 2020-11-07 RX ORDER — ERGOCALCIFEROL 1.25 MG/1
CAPSULE ORAL
Qty: 4 CAPSULE | Refills: 1 | Status: SHIPPED | OUTPATIENT
Start: 2020-11-07 | End: 2020-12-30

## 2020-11-23 LAB
ALBUMIN/GLOBULIN RATIO: 1.3 G/DL
ALBUMIN: 4.3 G/DL (ref 3.5–5)
ALP BLD-CCNC: 59 UNITS/L (ref 38–126)
ALT SERPL-CCNC: 13 UNITS/L (ref 4–35)
ANION GAP SERPL CALCULATED.3IONS-SCNC: 8.9 MMOL/L
AST SERPL-CCNC: 23 UNITS/L (ref 14–36)
BASOPHILS %: 0.48 (ref 0–3)
BASOPHILS ABSOLUTE: 0.01 (ref 0–0.3)
BILIRUB SERPL-MCNC: 0.5 MG/DL (ref 0.2–1.3)
BUN BLDV-MCNC: 11 MG/DL (ref 7–17)
CALCIUM SERPL-MCNC: 9.3 MG/DL (ref 8.4–10.2)
CHLORIDE BLD-SCNC: 103 MMOL/L (ref 98–120)
CO2: 26 MMOL/L (ref 22–31)
CREAT SERPL-MCNC: 0.8 MG/DL (ref 0.5–1)
EOSINOPHILS %: 1.65 (ref 0–10)
EOSINOPHILS ABSOLUTE: 0.05 (ref 0–1.1)
FERRITIN: 63.7 NG/DL (ref 10–282)
GFR CALCULATED: > 60
GLOBULIN: 3.3 G/DL
GLUCOSE: 97 MG/DL (ref 65–105)
HCT VFR BLD CALC: 34.5 % (ref 37–47)
HEMOGLOBIN: 11.6 (ref 12–16)
IRON: 65 MG/DL (ref 37–170)
LYMPHOCYTE %: 29.77 (ref 20–51.1)
LYMPHOCYTES ABSOLUTE: 0.83 (ref 1–5.5)
MCH RBC QN AUTO: 29.8 PG (ref 28.5–32.5)
MCHC RBC AUTO-ENTMCNC: 33.7 G/DL (ref 32–37)
MCV RBC AUTO: 88.6 FL (ref 80–94)
MONOCYTES %: 11.93 (ref 1.7–9.3)
MONOCYTES ABSOLUTE: 0.33 (ref 0.1–1)
NEUTROPHILS %: 56.17 (ref 42.2–75.2)
NEUTROPHILS ABSOLUTE: 1.57 (ref 2–8.1)
PDW BLD-RTO: 11 % (ref 8.5–15.5)
PLATELET # BLD: 75.52 THOU/MM3 (ref 130–400)
POTASSIUM SERPL-SCNC: 3.9 MMOL/L (ref 3.6–5)
RBC: 3.89 M/UL (ref 4.2–5.4)
SEDIMENTATION RATE, ERYTHROCYTE: 37 MM/HR (ref 0–30)
SODIUM BLD-SCNC: 138 MMOL/L (ref 135–145)
T4 FREE: 0.83 NG/DL (ref 0.78–2.19)
TOTAL IRON BINDING CAPACITY: 347 MG/DL (ref 261–497)
TOTAL PROTEIN, SERUM: 7.7 G/DL (ref 6.3–8.2)
TSH REFLEX FT4: 1.17 MIU/ML (ref 0.49–4.67)
VITAMIN B-12: 984 PG/ML (ref 239–931)
WBC: 2.8 THOU/ML3 (ref 4.8–10.8)

## 2020-11-24 LAB
ALBUMIN PERCENT: 63 % (ref 45–65)
ALBUMIN SERPL-MCNC: 4.6 G/DL (ref 3.2–5.2)
ALPHA 1 GLOBULIN PERCENT: 2 % (ref 3–6)
ALPHA 2 GLOBULIN PERCENT: 9 % (ref 6–13)
ALPHA 2 GLOBULIN: 0.6 G/DL (ref 0.5–0.9)
ALPHA-1-GLOBULIN: 0.2 G/DL (ref 0.1–0.4)
BETA GLOBULIN PERCENT: 11 % (ref 11–19)
BETA GLOBULIN: 0.8 G/DL (ref 0.5–1.1)
GAMMA GLOBULIN %: 15 % (ref 9–20)
GAMMA GLOBULIN: 1.1 G/DL (ref 0.5–1.5)
INTERPRETATION: ABNORMAL
PATHOLOGY REVIEW: ABNORMAL
TOTAL PROTEIN SUM PERCENT: 100 % (ref 98–102)
TOTAL PROTEIN SUM: 7.3 G/DL (ref 6.3–8.2)
TOTAL PROTEIN: 7.3 G/DL (ref 6.4–8.3)

## 2020-12-30 RX ORDER — ERGOCALCIFEROL 1.25 MG/1
CAPSULE ORAL
Qty: 4 CAPSULE | Refills: 1 | Status: SHIPPED | OUTPATIENT
Start: 2020-12-30 | End: 2021-02-25

## 2020-12-30 NOTE — TELEPHONE ENCOUNTER
Khalif Shelley is calling to request a refill on the following medication(s):  Requested Prescriptions     Pending Prescriptions Disp Refills    vitamin D (ERGOCALCIFEROL) 1.25 MG (22400 UT) CAPS capsule [Pharmacy Med Name: VITAMIN D2 1.25MG(50,000 UNIT)] 4 capsule 1     Sig: take 1 capsule by mouth every week       Last Visit Date (If Applicable):  48/35/8803    Next Visit Date:    Visit date not found

## 2021-01-04 DIAGNOSIS — I10 ESSENTIAL HYPERTENSION: ICD-10-CM

## 2021-01-04 RX ORDER — LOSARTAN POTASSIUM AND HYDROCHLOROTHIAZIDE 12.5; 1 MG/1; MG/1
1 TABLET ORAL DAILY
Qty: 90 TABLET | Refills: 3 | Status: SHIPPED | OUTPATIENT
Start: 2021-01-04 | End: 2021-01-27 | Stop reason: CLARIF

## 2021-01-04 NOTE — TELEPHONE ENCOUNTER
Kamran Cope is calling to request a refill on the following medication(s):  Requested Prescriptions     Pending Prescriptions Disp Refills    losartan-hydroCHLOROthiazide (HYZAAR) 100-12.5 MG per tablet 90 tablet 3     Sig: Take 1 tablet by mouth daily       Last Visit Date (If Applicable):  14/61/4829    Next Visit Date:    Visit date not found

## 2021-01-26 NOTE — PROGRESS NOTES
[address] [facility]  Dept: [phone]  Dept Fax: [phone]    Nate Hooks is a 50 y.o. female who presents today for her medical conditions/complaints as noted below. Nate Hooks c/o of Skin Problem (pt here for rash under both arms says has had for a week itches and red can feel raised bumps) and Follow-Up from Hospital (pt was also seen yesterday in ER for dizziness)      HPI:   Patient presents to the office with concerns for a rash under bilateral arm. Symptoms started 1 week ago. She reports starting new medication after follow up with her cardiologist. She thinks the medication is causing her rash. She was evaluated at [facility] ED yesterday for dizziness. Review of records indicates she was diagnosed with orthostatic hypotension, likely caused from labetalol. She is unable to review her medications, states she cannot remember what she is taking with all the recent changes. She did not take any medication yet today. Her potassium was noted as low 3.4 yesterday and was not replaced. She follows with Dr. Fay Cesar at [facility]. Rash  This is a new problem. The current episode started 1 to 4 weeks ago (1 week). The problem is unchanged. The affected locations include the left axilla and right axilla. The rash is characterized by redness, itchiness and burning. She was exposed to a new medication. Associated symptoms include fatigue. Pertinent negatives include no congestion, cough, diarrhea, fever, joint pain, rhinorrhea, shortness of breath or sore throat. Past treatments include nothing.        BP Readings from Last 3 Encounters:   01/27/21 120/86   10/22/20 (!) 160/102   07/22/20 (!) 172/112              ([id number])    Pulse Readings from Last 3 Encounters:   01/27/21 65   10/22/20 71   07/22/20 66        Wt Readings from Last 3 Encounters:   01/27/21 201 lb 11.2 oz (91.5 kg)   10/22/20 201 lb 4.8 oz (91.3 kg) 07/22/20 201 lb (91.2 kg)       Past Medical History:   Diagnosis Date    Chronic fatigue     Hypertension     Iron deficiency anemia     CALLY (obstructive sleep apnea)       Past Surgical History:   Procedure Laterality Date    LYMPH NODE BIOPSY         No family history on file. Social History     Tobacco Use    Smoking status: Never Smoker    Smokeless tobacco: Never Used   Substance Use Topics    Alcohol use: Not on file      Current Outpatient Medications   Medication Sig Dispense Refill    potassium chloride (KLOR-CON M) 20 MEQ extended release tablet Take 1 tablet by mouth daily for 3 days 3 tablet 0    clotrimazole (LOTRIMIN AF) 1 % cream Apply topically 2 times daily to affected skin for 14 days. 1 Tube 0    hydroxychloroquine (PLAQUENIL) 200 MG tablet take 1 tablet by mouth twice a day      labetalol (NORMODYNE) 100 MG tablet take 1 tablet by mouth twice a day      losartan (COZAAR) 100 MG tablet take 1 tablet by mouth once daily      NIFEdipine (PROCARDIA XL) 90 MG extended release tablet take 1 tablet by mouth once daily      olmesartan-hydroCHLOROthiazide (BENICAR HCT) 40-25 MG per tablet take 1 tablet by mouth once daily      vitamin D (ERGOCALCIFEROL) 1.25 MG (56296 UT) CAPS capsule take 1 capsule by mouth every week 4 capsule 1    amLODIPine (NORVASC) 10 MG tablet Take 1 tablet by mouth daily 90 tablet 3    cloNIDine (CATAPRES) 0.1 MG tablet Take 1 tablet by mouth daily as needed for High Blood Pressure SBP >160 30 tablet 3    fexofenadine (RA ALLERGY RELIEF) 180 MG tablet take 1 tablet by mouth once daily 30 tablet 5    ferrous sulfate (IRON 325) 325 (65 Fe) MG tablet 325 mg 2 times daily      gabapentin (NEURONTIN) 300 MG capsule Take 1 capsule by mouth 3 times daily for 180 days. Intended supply: 90 days (Patient taking differently: Take 300 mg by mouth daily.  Intended supply: 90 days) 180 capsule 0    naproxen (NAPROSYN) 500 MG tablet Take 1 tablet by mouth 2 times daily as needed for Pain Take one po BID. Take with food. 60 tablet 0    omeprazole (PRILOSEC) 20 MG delayed release capsule Take 20 mg by mouth daily as needed      pimecrolimus (ELIDEL) 1 % cream Elidel 1 % topical cream       No current facility-administered medications for this visit. Allergies   Allergen Reactions    Dexlansoprazole     Tetanus Toxoid        Health Maintenance   Topic Date Due    Hepatitis C screen  1975    Cervical cancer screen  08/05/1994    Flu vaccine (1) 09/01/2020    HIV screen  01/16/2029 (Originally 8/5/1988)    Potassium monitoring  01/26/2022    Creatinine monitoring  01/26/2022    Diabetes screen  07/01/2023    Lipid screen  05/19/2025    Hepatitis A vaccine  Aged Out    Hepatitis B vaccine  Aged Out    Hib vaccine  Aged Out    Meningococcal (ACWY) vaccine  Aged Out    Pneumococcal 0-64 years Vaccine  Aged Out       Subjective:      Review of Systems   Constitutional: Positive for fatigue. Negative for chills and fever. HENT: Negative for congestion, rhinorrhea and sore throat. Respiratory: Negative for cough, shortness of breath and wheezing. Cardiovascular: Negative for chest pain. Gastrointestinal: Negative for abdominal pain, diarrhea and nausea. Musculoskeletal: Negative for arthralgias, joint pain and myalgias. Skin: Positive for rash (under bilateral arm). Neurological: Positive for light-headedness and headaches. Negative for dizziness. Objective:     /86   Pulse 65   Wt 201 lb 11.2 oz (91.5 kg)   SpO2 99%   BMI 30.67 kg/m²     Physical Exam  Constitutional:       Appearance: Normal appearance. She is well-developed. HENT:      Head: Normocephalic. Eyes:      Conjunctiva/sclera: Conjunctivae normal.   Neck:      Musculoskeletal: Neck supple. Cardiovascular:      Rate and Rhythm: Normal rate and regular rhythm. Heart sounds: Normal heart sounds.    Pulmonary:      Effort: Pulmonary effort is normal. No respiratory distress. Breath sounds: Normal breath sounds. Lymphadenopathy:      Cervical: No cervical adenopathy. Skin:     Findings: Rash (bilateral axilla) present. Rash is macular, papular and scaling. Neurological:      Mental Status: She is alert and oriented to person, place, and time. Psychiatric:         Mood and Affect: Affect is blunt. PHQ Scores 5/6/2020 1/16/2019   PHQ2 Score 0 0   PHQ9 Score 0 0     Interpretation of Total Score Depression Severity: 1-4 = Minimal depression, 5-9 = Mild depression, 10-14 = Moderate depression, 15-19 = Moderately severe depression, 20-27 = Severe depression     Lab Results   Component Value Date     01/26/2021    K 3.4 (L) 01/26/2021     01/26/2021    CO2 29 01/26/2021    BUN 13 01/26/2021    CREATININE 0.9 01/26/2021    GLUCOSE 101 01/26/2021    CALCIUM 9.3 01/26/2021    PROT 7.3 11/23/2020    LABALBU 4.3 01/26/2021    BILITOT 0.2 01/26/2021    ALKPHOS 60 01/26/2021    AST 23 01/26/2021    ALT 14 01/26/2021    LABGLOM > 60.0 01/26/2021    AGRATIO 1.2 09/16/2019    GLOB 3.5 01/26/2021     Lab Results   Component Value Date    LABA1C 5.2 07/01/2020       Lab Results   Component Value Date    GLUF 97 03/15/2019    CREATININE 0.9 01/26/2021       Assessment:     1. Tinea corporis    2. Hypokalemia          Plan:     Orders Placed This Encounter   Procedures    Potassium     Standing Status:   Future     Standing Expiration Date:   1/27/2022       Orders Placed This Encounter   Medications    potassium chloride (KLOR-CON M) 20 MEQ extended release tablet     Sig: Take 1 tablet by mouth daily for 3 days     Dispense:  3 tablet     Refill:  0    clotrimazole (LOTRIMIN AF) 1 % cream     Sig: Apply topically 2 times daily to affected skin for 14 days. Dispense:  1 Tube     Refill:  0      Keep area clean and dry. Start lotrimin cream 2 times daily to affected area.   Instructed to take potassium supplement daily for 3 days, then have level rechecked. Discussed use, benefit, and side effects of prescribed medications. All patient questions answered. Patient voiced understanding. Instructed to continue current medications, diet and exercise. Patient agreed with treatment plan. Follow up as directed. Return if symptoms worsen or fail to improve.     Electronically signed by ZOE Wolff CNP on 1/27/2021

## 2021-01-27 ENCOUNTER — OFFICE VISIT (OUTPATIENT)
Dept: FAMILY MEDICINE CLINIC | Age: 46
End: 2021-01-27
Payer: COMMERCIAL

## 2021-01-27 VITALS
HEART RATE: 65 BPM | SYSTOLIC BLOOD PRESSURE: 120 MMHG | DIASTOLIC BLOOD PRESSURE: 86 MMHG | BODY MASS INDEX: 30.67 KG/M2 | OXYGEN SATURATION: 99 % | WEIGHT: 201.7 LBS

## 2021-01-27 DIAGNOSIS — E87.6 HYPOKALEMIA: ICD-10-CM

## 2021-01-27 DIAGNOSIS — B35.4 TINEA CORPORIS: Primary | ICD-10-CM

## 2021-01-27 PROCEDURE — 99213 OFFICE O/P EST LOW 20 MIN: CPT | Performed by: NURSE PRACTITIONER

## 2021-01-27 RX ORDER — CLOTRIMAZOLE 1 %
CREAM (GRAM) TOPICAL
Qty: 1 TUBE | Refills: 0 | Status: SHIPPED | OUTPATIENT
Start: 2021-01-27 | End: 2021-02-10

## 2021-01-27 RX ORDER — OLMESARTAN MEDOXOMIL AND HYDROCHLOROTHIAZIDE 40/25 40; 25 MG/1; MG/1
TABLET ORAL
COMMUNITY
Start: 2021-01-22

## 2021-01-27 RX ORDER — LABETALOL 100 MG/1
TABLET, FILM COATED ORAL
COMMUNITY
Start: 2021-01-22

## 2021-01-27 RX ORDER — NIFEDIPINE 90 MG/1
TABLET, EXTENDED RELEASE ORAL
COMMUNITY
Start: 2021-01-17

## 2021-01-27 RX ORDER — POTASSIUM CHLORIDE 20 MEQ/1
20 TABLET, EXTENDED RELEASE ORAL DAILY
Qty: 3 TABLET | Refills: 0 | Status: SHIPPED | OUTPATIENT
Start: 2021-01-27 | End: 2022-05-17

## 2021-01-27 RX ORDER — HYDROXYCHLOROQUINE SULFATE 200 MG/1
TABLET, FILM COATED ORAL
COMMUNITY
Start: 2021-01-14

## 2021-01-27 RX ORDER — LOSARTAN POTASSIUM 100 MG/1
TABLET ORAL
COMMUNITY
Start: 2020-11-30 | End: 2021-04-27

## 2021-01-27 ASSESSMENT — ENCOUNTER SYMPTOMS
ABDOMINAL PAIN: 0
WHEEZING: 0
SHORTNESS OF BREATH: 0
COUGH: 0
RHINORRHEA: 0
DIARRHEA: 0
NAUSEA: 0
SORE THROAT: 0

## 2021-02-13 DIAGNOSIS — I10 ESSENTIAL HYPERTENSION: ICD-10-CM

## 2021-02-15 RX ORDER — CLONIDINE HYDROCHLORIDE 0.1 MG/1
TABLET ORAL
Qty: 30 TABLET | Refills: 3 | Status: SHIPPED | OUTPATIENT
Start: 2021-02-15 | End: 2021-09-13

## 2021-02-15 NOTE — TELEPHONE ENCOUNTER
Basilia Pride is calling to request a refill on the following medication(s):  Requested Prescriptions     Pending Prescriptions Disp Refills    cloNIDine (CATAPRES) 0.1 MG tablet [Pharmacy Med Name: CLONIDINE HCL 0.1 MG TABLET] 30 tablet 3     Sig: take 1 tablet by mouth once daily if needed FOR BLOOD PRESSURE SBP OVER 160       Last Visit Date (If Applicable):  9/52/3413    Next Visit Date:    Visit date not found

## 2021-02-25 DIAGNOSIS — E55.9 VITAMIN D DEFICIENCY: ICD-10-CM

## 2021-02-25 RX ORDER — ERGOCALCIFEROL 1.25 MG/1
CAPSULE ORAL
Qty: 4 CAPSULE | Refills: 1 | Status: SHIPPED | OUTPATIENT
Start: 2021-02-25 | End: 2021-12-08 | Stop reason: DRUGHIGH

## 2021-02-25 NOTE — TELEPHONE ENCOUNTER
Robby Mcnally is calling to request a refill on the following medication(s):  Requested Prescriptions     Pending Prescriptions Disp Refills    vitamin D (ERGOCALCIFEROL) 1.25 MG (89180 UT) CAPS capsule [Pharmacy Med Name: VITAMIN D2 1.25MG(50,000 UNIT)] 4 capsule 1     Sig: take 1 capsule by mouth every week       Last Visit Date (If Applicable):  5/81/0918    Next Visit Date:    Visit date not found

## 2021-03-20 DIAGNOSIS — J30.9 ALLERGIC RHINITIS, UNSPECIFIED SEASONALITY, UNSPECIFIED TRIGGER: ICD-10-CM

## 2021-03-22 RX ORDER — FEXOFENADINE HCL 180 MG/1
TABLET ORAL
Qty: 30 TABLET | Refills: 5 | Status: SHIPPED | OUTPATIENT
Start: 2021-03-22 | End: 2021-04-27

## 2021-03-22 NOTE — TELEPHONE ENCOUNTER
Lou Standard is calling to request a refill on the following medication(s):  Requested Prescriptions     Pending Prescriptions Disp Refills    fexofenadine (RA ALLERGY RELIEF) 180 MG tablet [Pharmacy Med Name: RA ALLERGY RELIEF 180 MG TAB] 30 tablet 5     Sig: take 1 tablet by mouth once daily       Last Visit Date (If Applicable):  8/19/4721    Next Visit Date:    Visit date not found

## 2021-04-09 LAB
ALBUMIN/GLOBULIN RATIO: 1.3 G/DL
ALBUMIN: 4.3 G/DL (ref 3.5–5)
ALP BLD-CCNC: 62 UNITS/L (ref 38–126)
ALT SERPL-CCNC: 14 UNITS/L (ref 4–35)
ANION GAP SERPL CALCULATED.3IONS-SCNC: 9.9 MMOL/L
AST SERPL-CCNC: 24 UNITS/L (ref 14–36)
BASOPHILS %: 0.93 (ref 0–3)
BASOPHILS ABSOLUTE: 0.04 (ref 0–0.3)
BILIRUB SERPL-MCNC: 0.2 MG/DL (ref 0.2–1.3)
BUN BLDV-MCNC: 21 MG/DL (ref 7–17)
CALCIUM SERPL-MCNC: 9.4 MG/DL (ref 8.4–10.2)
CHLORIDE BLD-SCNC: 102 MMOL/L (ref 98–120)
CO2: 27 MMOL/L (ref 22–31)
CREAT SERPL-MCNC: 0.9 MG/DL (ref 0.5–1)
EOSINOPHILS %: 3.92 (ref 0–10)
EOSINOPHILS ABSOLUTE: 0.15 (ref 0–1.1)
GFR CALCULATED: > 60
GLOBULIN: 3.4 G/DL
GLUCOSE: 118 MG/DL (ref 65–105)
HCT VFR BLD CALC: 38.4 % (ref 37–47)
HEMOGLOBIN: 12.4 (ref 12–16)
LYMPHOCYTE %: 20.46 (ref 20–51.1)
LYMPHOCYTES ABSOLUTE: 0.78 (ref 1–5.5)
MCH RBC QN AUTO: 30.4 PG (ref 28.5–32.5)
MCHC RBC AUTO-ENTMCNC: 32.3 G/DL (ref 32–37)
MCV RBC AUTO: 94.1 FL (ref 80–94)
MONOCYTES %: 11.47 (ref 1.7–9.3)
MONOCYTES ABSOLUTE: 0.44 (ref 0.1–1)
NEUTROPHILS %: 63.22 (ref 42.2–75.2)
NEUTROPHILS ABSOLUTE: 2.41 (ref 2–8.1)
PDW BLD-RTO: 12.1 % (ref 8.5–15.5)
PLATELET # BLD: 47.39 THOU/MM3 (ref 130–400)
POTASSIUM SERPL-SCNC: 3.7 MMOL/L (ref 3.6–5)
RBC: 4.08 M/UL (ref 4.2–5.4)
SODIUM BLD-SCNC: 139 MMOL/L (ref 135–145)
TOTAL PROTEIN, SERUM: 7.7 G/DL (ref 6.3–8.2)
VITAMIN B-12: 949 PG/ML (ref 239–931)
WBC: 3.8 THOU/ML3 (ref 4.8–10.8)

## 2021-04-13 LAB
ALBUMIN PERCENT: 61 % (ref 45–65)
ALBUMIN SERPL-MCNC: 4.3 G/DL (ref 3.2–5.2)
ALPHA 1 GLOBULIN PERCENT: 2 % (ref 3–6)
ALPHA 2 GLOBULIN PERCENT: 9 % (ref 6–13)
ALPHA 2 GLOBULIN: 0.7 G/DL (ref 0.5–0.9)
ALPHA-1-GLOBULIN: 0.2 G/DL (ref 0.1–0.4)
BETA GLOBULIN PERCENT: 10 % (ref 11–19)
BETA GLOBULIN: 0.7 G/DL (ref 0.5–1.1)
GAMMA GLOBULIN %: 17 % (ref 9–20)
GAMMA GLOBULIN: 1.2 G/DL (ref 0.5–1.5)
INTERPRETATION: ABNORMAL
PATHOLOGY REVIEW: ABNORMAL
TOTAL PROTEIN SUM PERCENT: 99 % (ref 98–102)
TOTAL PROTEIN SUM: 7.1 G/DL (ref 6.3–8.2)
TOTAL PROTEIN: 7.1 G/DL (ref 6.4–8.3)

## 2021-04-27 ENCOUNTER — OFFICE VISIT (OUTPATIENT)
Dept: INFECTIOUS DISEASES | Age: 46
End: 2021-04-27
Payer: COMMERCIAL

## 2021-04-27 VITALS
DIASTOLIC BLOOD PRESSURE: 103 MMHG | BODY MASS INDEX: 31.67 KG/M2 | SYSTOLIC BLOOD PRESSURE: 160 MMHG | HEART RATE: 70 BPM | WEIGHT: 209 LBS | HEIGHT: 68 IN

## 2021-04-27 DIAGNOSIS — D61.818 PANCYTOPENIA (HCC): ICD-10-CM

## 2021-04-27 DIAGNOSIS — G47.33 OSA (OBSTRUCTIVE SLEEP APNEA): ICD-10-CM

## 2021-04-27 DIAGNOSIS — M32.9 SYSTEMIC LUPUS ERYTHEMATOSUS, UNSPECIFIED SLE TYPE, UNSPECIFIED ORGAN INVOLVEMENT STATUS (HCC): ICD-10-CM

## 2021-04-27 DIAGNOSIS — A69.20 LYME DISEASE: Primary | ICD-10-CM

## 2021-04-27 DIAGNOSIS — R20.0 RIGHT LEG NUMBNESS: ICD-10-CM

## 2021-04-27 DIAGNOSIS — G93.32 CHRONIC FATIGUE SYNDROME: ICD-10-CM

## 2021-04-27 DIAGNOSIS — R90.82 WHITE MATTER ABNORMALITY ON MRI OF BRAIN: ICD-10-CM

## 2021-04-27 PROCEDURE — 99203 OFFICE O/P NEW LOW 30 MIN: CPT | Performed by: INTERNAL MEDICINE

## 2021-04-27 RX ORDER — DOXYCYCLINE HYCLATE 100 MG
100 TABLET ORAL 2 TIMES DAILY
Qty: 28 TABLET | Refills: 0 | Status: SHIPPED | OUTPATIENT
Start: 2021-04-27 | End: 2021-05-25

## 2021-04-27 RX ORDER — ASPIRIN 81 MG/1
81 TABLET ORAL DAILY
COMMUNITY

## 2021-04-27 RX ORDER — RIMEGEPANT SULFATE 75 MG/75MG
TABLET, ORALLY DISINTEGRATING ORAL
COMMUNITY
End: 2022-05-17

## 2021-04-27 NOTE — PROGRESS NOTES
[facility] - Initial Office Consult Note  Today's Date and Time: 4/27/2021, 5:37 PM    Diagnostic Impression :   · Hx of SLE  · Previous Lyme Exposure  · Chronic fatigue   · Essential HTN  · CALLY  · White matter abnormality on MRI of the brain  · Chronic pancytopenia    Recommendations   · Doxycycline 100 mg p.o. twice daily x4 weeks  · Monitor clinical response    Chief complaint/reason for consultation:     Chief Complaint   Patient presents with    Other     New patient, Lymes       History of Present Illness:   Yris Olsen is a 50y.o.-year-old  female who was initially evaluated on 4/27/2021. Patient seen at the request of Dr. Marco Bahena. INITIAL HISTORY     This is a 50 y.o. female w/ PMHx SLE, ANÍBAL, CALLY, HTN, eczema, and chronic thrombocytopenia who presents today with multiple complaints of ongoing symptoms with a recent diagnosis of Lyme disease exposure. Pt states that for the past year she has been feeling fatigue, numbness of the Rt leg, blurriness and blind spots in her vision, occasional chills, lymphadenopathy. She follows with Dr. Carine Bojorquez at [facility] who referred her to Dr. Tonia Tang (rheumatologist) and Dr. Marco Bahena (neurologist). Patient has undergone an extensive work up including imaging of the brain showing previous infarctions as well as an MRI with hyperintensities in the pericallosal and periventricular regions concerning for MS. Patient was recommended undergo further workup for MS but she declined. She was recently diagnosed with SLE a few months prior and has been taking Hydroxychloroquine 200 BID. She is on several antihypertensives and her BP today is elevated with DBP >100. Patient was also found to have elevated Lyme IgG titers but non reactive IgM titers. She believes that she may have been exposed in an outdoor area last fall to a tick bite but does not remember any characteristic rashes.     Physical the patient would be considered to have had prior exposure to Lyme's disease, but not to have an acute infection. · She reports that she has not had any antibiotic treatment for this condition. I suggested to her and her  that it would be prudent to proceed with a course of at least 1 month of oral doxycycline to try to control any residual activity from the Lyme's infection prevent any potential further injury to the central nervous system. · I indicated to the patient that I do not know if any of the other medical problems she is experiencing have any relationship to the Lyme's disease. · I have suggested that she continue her follow-up with the different specialists that are looking after her neurological and rheumatological problems as well as her PCP. · I will plan to see her in 2 months after she has had 1 month of therapy with doxycycline. Emphasized that the treatment with doxycycline may not produce any improvement on her current symptoms. The treatment prescribed is may lead to prevent any potential future damage to the tissues by a previously untreated Lyme's infection. PLAN:  ·   Patient was prescribed Doxycycline 100 mg BID for 4 weeks. · Will f/u in 2 months to assess for clinical improvements. Patient seen face to face for a period of 30 min, of which more than 50% of the time was spent on counseling, explanation of diagnosis, planning of further management, and answering all questions  . I have personally reviewed the past medical history, past surgical history, medications, social history, and family history, and I have updated the database accordingly.   Past Medical History:     Past Medical History:   Diagnosis Date    Chronic fatigue     Hypertension     Iron deficiency anemia     CALLY (obstructive sleep apnea)        Past Surgical  History:     Past Surgical History:   Procedure Laterality Date    LYMPH NODE BIOPSY         Medications:     Current Outpatient Medications:     Rimegepant Sulfate (NURTEC) 75 MG TBDP, Take by mouth, Disp: , Rfl:     aspirin 81 MG EC tablet, Take 81 mg by mouth daily, Disp: , Rfl:     Multiple Vitamin (MULTIVITAMIN ADULT PO), Take by mouth, Disp: , Rfl:     Cetirizine-Pseudoephedrine (ALLERGY RELIEF D PO), Take by mouth, Disp: , Rfl:     doxycycline hyclate (VIBRA-TABS) 100 MG tablet, Take 1 tablet by mouth 2 times daily for 28 days, Disp: 28 tablet, Rfl: 0    vitamin D (ERGOCALCIFEROL) 1.25 MG (75877 UT) CAPS capsule, take 1 capsule by mouth every week, Disp: 4 capsule, Rfl: 1    cloNIDine (CATAPRES) 0.1 MG tablet, take 1 tablet by mouth once daily if needed FOR BLOOD PRESSURE SBP OVER 160, Disp: 30 tablet, Rfl: 3    hydroxychloroquine (PLAQUENIL) 200 MG tablet, take 1 tablet by mouth twice a day, Disp: , Rfl:     labetalol (NORMODYNE) 100 MG tablet, take 1 tablet by mouth twice a day, Disp: , Rfl:     NIFEdipine (PROCARDIA XL) 90 MG extended release tablet, take 1 tablet by mouth once daily, Disp: , Rfl:     olmesartan-hydroCHLOROthiazide (BENICAR HCT) 40-25 MG per tablet, take 1 tablet by mouth once daily, Disp: , Rfl:     ferrous sulfate (IRON 325) 325 (65 Fe) MG tablet, 325 mg 2 times daily, Disp: , Rfl:     naproxen (NAPROSYN) 500 MG tablet, Take 1 tablet by mouth 2 times daily as needed for Pain Take one po BID. Take with food. , Disp: 60 tablet, Rfl: 0    potassium chloride (KLOR-CON M) 20 MEQ extended release tablet, Take 1 tablet by mouth daily for 3 days, Disp: 3 tablet, Rfl: 0    gabapentin (NEURONTIN) 300 MG capsule, Take 1 capsule by mouth 3 times daily for 180 days. Intended supply: 90 days (Patient taking differently: Take 300 mg by mouth daily.  Intended supply: 90 days), Disp: 180 capsule, Rfl: 0     Social History:     Social History     Socioeconomic History    Marital status:      Spouse name: Not on file    Number of children: Not on file    Years of education: Not on file    Highest education level: Not on file   Occupational History    Not on file   Social Needs    Financial resource strain: Not on file    Food insecurity     Worry: Not on file     Inability: Not on file    Transportation needs     Medical: Not on file     Non-medical: Not on file   Tobacco Use    Smoking status: Never Smoker    Smokeless tobacco: Never Used   Substance and Sexual Activity    Alcohol use: Not on file    Drug use: Not on file    Sexual activity: Not on file   Lifestyle    Physical activity     Days per week: Not on file     Minutes per session: Not on file    Stress: Not on file   Relationships    Social connections     Talks on phone: Not on file     Gets together: Not on file     Attends Buddhist service: Not on file     Active member of club or organization: Not on file     Attends meetings of clubs or organizations: Not on file     Relationship status: Not on file    Intimate partner violence     Fear of current or ex partner: Not on file     Emotionally abused: Not on file     Physically abused: Not on file     Forced sexual activity: Not on file   Other Topics Concern    Not on file   Social History Narrative    Not on file       Family History:   History reviewed. No pertinent family history. Allergies:   Dexlansoprazole and Tetanus toxoid     Review of Systems:   Constitutional: No fevers or chills. No systemic complaints  Head: No headaches  Eyes: No double vision or blurry vision. ENT: No sore throat or runny nose. . No hearing loss, tinnitus or vertigo. Cardiovascular: No chest pain or palpitations. No shortness of breath. No TABARES  Lung: No shortness of breath or cough. No sputum production  Abdomen: No nausea, vomiting, diarrhea, or abdominal pain. .  Genitourinary: No increased urinary frequency, or dysuria. No hematuria. No suprapubic or CVA pain  Musculoskeletal: No muscle aches or pains. No joint effusions, swelling or deformities  Hematologic: No bleeding or bruising.   Neurologic: No headache, weakness, numbness, or tingling except for the numbness on the right leg    Physical Examination :   BP (!) 160/103   Pulse 70   Ht 5' 8\" (1.727 m)   Wt 209 lb (94.8 kg)   BMI 31.78 kg/m²    General Appearance: Awake, alert, and in no apparent distress  Head:  Normocephalic, no trauma  Eyes: Pupils equal, round, reactive, to light and accommodation; extraocular movements intact; sclera anicteric; conjunctivae pink. No embolic phenomena. ENT: Oropharynx clear, without erythema, exudate, or thrush. No tenderness of sinuses. Mouth/throat: mucosa pink and moist. No lesions. Dentition in good repair. Neck:Supple, without lymphadenopathy. Thyroid normal, No bruits. Pulmonary/Chest: Clear to auscultation, without wheezes, rales, or rhonchi. No dullness to percussion. Cardiovascular: Regular rate and rhythm without murmurs, rubs, or gallops. Abdomen: Soft, non tender. Bowel sounds normal. No organomegaly  All four Extremities: No cyanosis, clubbing, edema, or effusions. Neurologic: No gross sensory or motor deficits. Skin: Warm and dry with good turgor. No signs of peripheral arterial or venous insufficiency.     Medical Decision Making:   I have independently reviewed/ordered the following labs:    CBC with Differential:  Lab Results   Component Value Date    WBC 3.8 04/09/2021    WBC 3.1 03/11/2021    WBC 3.79 09/16/2019    HGB 12.4 04/09/2021    HGB 12.0 03/11/2021    HCT 38.4 04/09/2021    HCT 39.2 03/11/2021    PLT 47.39 04/09/2021    PLT 56.17 03/11/2021    LYMPHOPCT 20.46 04/09/2021    LYMPHOPCT 17.85 03/11/2021    MONOPCT 10.7 02/23/2020    MONOPCT 16.4 11/19/2019    EOSPCT 3.5 02/23/2020    EOSPCT 3.1 11/19/2019     BMP:   Lab Results   Component Value Date     04/09/2021     03/11/2021    K 3.7 04/09/2021    K 4.0 03/11/2021     04/09/2021     03/11/2021    CO2 27 04/09/2021    CO2 27 03/11/2021    BUN 21 04/09/2021    BUN 14 03/11/2021    CREATININE 0.9 04/09/2021

## 2021-04-28 NOTE — PATIENT INSTRUCTIONS
The patient  28 tablets of doxy on 4-27-21. I have phoned in the remaining 28 tablets and spoke to Alexa. VICKIE    Pt called on 4-30-21 and was informed that the medication was called into the pharmacy and  date will be on 5-8-21.  VICKIE

## 2021-05-10 ENCOUNTER — OFFICE VISIT (OUTPATIENT)
Dept: FAMILY MEDICINE CLINIC | Age: 46
End: 2021-05-10
Payer: COMMERCIAL

## 2021-05-10 VITALS
WEIGHT: 212.8 LBS | HEART RATE: 100 BPM | OXYGEN SATURATION: 99 % | BODY MASS INDEX: 32.36 KG/M2 | DIASTOLIC BLOOD PRESSURE: 100 MMHG | SYSTOLIC BLOOD PRESSURE: 150 MMHG

## 2021-05-10 DIAGNOSIS — R35.0 FREQUENCY OF URINATION: Primary | ICD-10-CM

## 2021-05-10 DIAGNOSIS — R10.9 ACUTE LEFT FLANK PAIN: ICD-10-CM

## 2021-05-10 PROCEDURE — 99213 OFFICE O/P EST LOW 20 MIN: CPT | Performed by: NURSE PRACTITIONER

## 2021-05-10 ASSESSMENT — ENCOUNTER SYMPTOMS
BOWEL INCONTINENCE: 0
VOMITING: 0
WHEEZING: 0
DIARRHEA: 0
COUGH: 0
ABDOMINAL PAIN: 0
SHORTNESS OF BREATH: 0
NAUSEA: 1

## 2021-05-10 ASSESSMENT — PATIENT HEALTH QUESTIONNAIRE - PHQ9
2. FEELING DOWN, DEPRESSED OR HOPELESS: 0
1. LITTLE INTEREST OR PLEASURE IN DOING THINGS: 0
SUM OF ALL RESPONSES TO PHQ QUESTIONS 1-9: 0

## 2021-05-10 NOTE — PROGRESS NOTES
[id number]  Dept: [phone]  Dept Fax: [phone]      Nikki Duffy is a 50 y.o. female who presents today for her medical conditions/complaints as noted below. Chief Complaint   Patient presents with    Flank Pain     c/o left kidney pain for about a week and half, denies any urinary issues no urgency, dysuria. does report at end of day has more frequency       HPI:   Patient presents to the office complaining of left-sided flank pain. Flank Pain  This is a new problem. The current episode started 1 to 4 weeks ago (1 week). The problem occurs constantly. The problem is unchanged. Pain location: left. The quality of the pain is described as aching. The pain does not radiate. The pain is mild. Exacerbated by: nothing. Pertinent negatives include no abdominal pain, bladder incontinence, bowel incontinence, chest pain, dysuria or fever. She has tried nothing for the symptoms. The treatment provided no relief.        Current Outpatient Medications   Medication Sig Dispense Refill    Rimegepant Sulfate (NURTEC) 75 MG TBDP Take by mouth      aspirin 81 MG EC tablet Take 81 mg by mouth daily      Multiple Vitamin (MULTIVITAMIN ADULT PO) Take by mouth      Cetirizine-Pseudoephedrine (ALLERGY RELIEF D PO) Take by mouth      doxycycline hyclate (VIBRA-TABS) 100 MG tablet Take 1 tablet by mouth 2 times daily for 28 days 28 tablet 0    vitamin D (ERGOCALCIFEROL) 1.25 MG (09370 UT) CAPS capsule take 1 capsule by mouth every week 4 capsule 1    cloNIDine (CATAPRES) 0.1 MG tablet take 1 tablet by mouth once daily if needed FOR BLOOD PRESSURE SBP OVER 160 30 tablet 3    hydroxychloroquine (PLAQUENIL) 200 MG tablet take 1 tablet by mouth twice a day      labetalol (NORMODYNE) 100 MG tablet take 1 tablet by mouth twice a day      NIFEdipine (PROCARDIA XL) 90 MG extended release tablet take 1 tablet by mouth once daily      olmesartan-hydroCHLOROthiazide (BENICAR HCT) 40-25 MG per tablet take 1 tablet by mouth once daily      potassium chloride (KLOR-CON M) 20 MEQ extended release tablet Take 1 tablet by mouth daily for 3 days 3 tablet 0    ferrous sulfate (IRON 325) 325 (65 Fe) MG tablet 325 mg 2 times daily      naproxen (NAPROSYN) 500 MG tablet Take 1 tablet by mouth 2 times daily as needed for Pain Take one po BID. Take with food. 60 tablet 0    gabapentin (NEURONTIN) 300 MG capsule Take 1 capsule by mouth 3 times daily for 180 days. Intended supply: 90 days (Patient taking differently: Take 300 mg by mouth daily. Intended supply: 90 days) 180 capsule 0     No current facility-administered medications for this visit.       Allergies   Allergen Reactions    Dexlansoprazole     Tetanus Toxoid        Past Medical History:   Diagnosis Date    Chronic fatigue     Hypertension     Iron deficiency anemia     CALLY (obstructive sleep apnea)      Past Surgical History:   Procedure Laterality Date    LYMPH NODE BIOPSY       Social History     Socioeconomic History    Marital status:      Spouse name: Not on file    Number of children: Not on file    Years of education: Not on file    Highest education level: Not on file   Occupational History    Not on file   Social Needs    Financial resource strain: Not on file    Food insecurity     Worry: Not on file     Inability: Not on file    Transportation needs     Medical: Not on file     Non-medical: Not on file   Tobacco Use    Smoking status: Never Smoker    Smokeless tobacco: Never Used   Substance and Sexual Activity    Alcohol use: Not on file    Drug use: Not on file    Sexual activity: Not on file   Lifestyle    Physical activity     Days per week: Not on file     Minutes per session: Not on file    Stress: Not on file   Relationships    Social connections     Talks on phone: Not on file     Gets together: Not on file     Attends Worship service: Not on Interpretation of Total Score Depression Severity: 1-4 = Minimal depression, 5-9 = Mild depression, 10-14 = Moderate depression, 15-19 = Moderately severe depression, 20-27 = Severe depression     Assessment:      Diagnosis Orders   1. Frequency of urination  Urinalysis Reflex to Culture   2. Acute left flank pain  Urinalysis Reflex to Culture       Plan:     Orders Placed This Encounter   Procedures    Urinalysis Reflex to Culture     Standing Status:   Future     Standing Expiration Date:   5/10/2022     Order Specific Question:   SPECIFY(EX-CATH,MIDSTREAM,CYSTO,ETC)? Answer:   mid     Patient is unable to provide urine while in office today. Order given to complete urine at later date. Instructed to drink plenty of fluids. Will call with results once received. Return if symptoms worsen or fail to improve. This note was generated completely or in part utilizing Dragon dictation speech recognition software. Occasionally, words are mistranscribed and despite editing, the text may contain inaccuracies due to incorrect word recognition. If further clarification is needed please contact the office at [phone].     Electronically signed by ZOE Santo CNP on 5/10/2021 at 3:08 PM

## 2021-05-11 LAB
ALBUMIN/GLOBULIN RATIO: 1.3 G/DL
ALBUMIN: 4.3 G/DL (ref 3.5–5)
ALP BLD-CCNC: 61 UNITS/L (ref 38–126)
ALT SERPL-CCNC: 15 UNITS/L (ref 4–35)
ANION GAP SERPL CALCULATED.3IONS-SCNC: 8.8 MMOL/L
AST SERPL-CCNC: 24 UNITS/L (ref 14–36)
BACTERIA, URINE: NORMAL
BASOPHILS %: 1.42 (ref 0–3)
BASOPHILS ABSOLUTE: 0.05 (ref 0–0.3)
BILIRUB SERPL-MCNC: 0.2 MG/DL (ref 0.2–1.3)
BILIRUBIN URINE: NEGATIVE
BLOOD, URINE: NEGATIVE
BUN BLDV-MCNC: 12 MG/DL (ref 7–17)
CALCIUM SERPL-MCNC: 9.4 MG/DL (ref 8.4–10.2)
CASTS UA: NORMAL
CHLORIDE BLD-SCNC: 104 MMOL/L (ref 98–120)
CLARITY: CLEAR
CO2: 25 MMOL/L (ref 22–31)
COLOR, URINE: YELLOW
CREAT SERPL-MCNC: 0.7 MG/DL (ref 0.5–1)
CRYSTALS, UA: NORMAL
EOSINOPHILS %: 4.02 (ref 0–10)
EOSINOPHILS ABSOLUTE: 0.13 (ref 0–1.1)
GFR CALCULATED: > 60
GLOBULIN: 3.2 G/DL
GLUCOSE URINE: NEGATIVE MG/DL
GLUCOSE: 136 MG/DL (ref 65–105)
HCT VFR BLD CALC: 34.2 % (ref 37–47)
HEMOGLOBIN: 11.2 (ref 12–16)
KETONES, URINE: NEGATIVE MG/DL
LEUKOCYTE ESTERASE, URINE: NEGATIVE
LYMPHOCYTE %: 27.01 (ref 20–51.1)
LYMPHOCYTES ABSOLUTE: 0.9 (ref 1–5.5)
MCH RBC QN AUTO: 30.8 PG (ref 28.5–32.5)
MCHC RBC AUTO-ENTMCNC: 32.8 G/DL (ref 32–37)
MCV RBC AUTO: 93.8 FL (ref 80–94)
MONOCYTES %: 8.59 (ref 1.7–9.3)
MONOCYTES ABSOLUTE: 0.29 (ref 0.1–1)
MUCUS, URINE: NORMAL
NEUTROPHILS %: 58.97 (ref 42.2–75.2)
NEUTROPHILS ABSOLUTE: 1.97 (ref 2–8.1)
NITRITE, URINE: NEGATIVE
PDW BLD-RTO: 11.8 % (ref 8.5–15.5)
PH UA: 6.5 (ref 5–8.5)
PLATELET # BLD: 59.11 THOU/MM3 (ref 130–400)
POTASSIUM SERPL-SCNC: 3.6 MMOL/L (ref 3.6–5)
PROTEIN UA: NEGATIVE MG/DL
RBC URINE: NORMAL (ref 0–2)
RBC: 3.65 M/UL (ref 4.2–5.4)
SODIUM BLD-SCNC: 138 MMOL/L (ref 135–145)
SPECIFIC GRAVITY, URINE: 1.02 MG/DL (ref 1–1.03)
SQUAMOUS EPITHELIAL: NORMAL
TOTAL PROTEIN, SERUM: 7.5 G/DL (ref 6.3–8.2)
TRICHOMONAS, URINE: NORMAL
UROBILINOGEN, URINE: 0.2 MG/DL (ref 0.2–1)
WBC URINE: NORMAL (ref 0–4)
WBC: 3.3 THOU/ML3 (ref 4.8–10.8)
YEAST, URINE: NORMAL

## 2021-05-19 LAB
ALBUMIN/GLOBULIN RATIO: 1.43 G/DL
ALBUMIN: 4.3 G/DL (ref 3.5–5)
ALP BLD-CCNC: 55 UNITS/L (ref 38–126)
ALT SERPL-CCNC: 13 UNITS/L (ref 4–35)
AMPHETAMINE SCREEN, URINE: NEGATIVE
ANION GAP SERPL CALCULATED.3IONS-SCNC: 13.8 MMOL/L
APTT: 23 SEC (ref 22–32)
AST SERPL-CCNC: 21 UNITS/L (ref 14–36)
B-TYPE NATRIURETIC PEPTIDE: 26.1 PG/ML (ref 0–100)
BACTERIA, URINE: NORMAL
BARBITURATE SCREEN, URINE: NEGATIVE
BASOPHILS %: 1.04 (ref 0–3)
BASOPHILS ABSOLUTE: 0.04 (ref 0–0.3)
BENZODIAZEPINES, URINE SCREEN: NEGATIVE
BILIRUB SERPL-MCNC: 0.2 MG/DL (ref 0.2–1.3)
BILIRUBIN URINE: NEGATIVE
BLOOD, URINE: NEGATIVE
BUN BLDV-MCNC: 13 MG/DL (ref 7–17)
CALCIUM SERPL-MCNC: 9.4 MG/DL (ref 8.4–10.2)
CANNABINOID SCREEN URINE: POSITIVE
CASTS UA: NORMAL
CHLORIDE BLD-SCNC: 104 MMOL/L (ref 98–120)
CLARITY: CLEAR
CO2: 24 MMOL/L (ref 22–31)
COCAINE(METAB.)SCREEN, URINE: NEGATIVE
COLOR, URINE: NORMAL
CREAT SERPL-MCNC: 0.8 MG/DL (ref 0.5–1)
CREATINE KINASE-MB INDEX: 0.5 % (ref 0–5)
CREATINE KINASE-MB INDEX: 2.6 % (ref 0–5)
CREATINE KINASE-MB: 0.62 NG/ML (ref 0–2.4)
CREATINE KINASE-MB: 3.47 NG/ML (ref 0–2.4)
CREATINE KINASE: 127 UNITS/L (ref 25–170)
CREATINE KINASE: 134 UNITS/L (ref 25–170)
CRYSTALS, UA: NORMAL
D-DIMER QUANTITATIVE: < 245 NG/DL (ref 0–245)
EOSINOPHILS %: 1.96 (ref 0–10)
EOSINOPHILS ABSOLUTE: 0.07 (ref 0–1.1)
GFR CALCULATED: > 60
GLOBULIN: 3 G/DL
GLUCOSE URINE: NEGATIVE MG/DL
GLUCOSE: 109 MG/DL (ref 65–105)
HCT VFR BLD CALC: 36.5 % (ref 37–47)
HEMOGLOBIN: 11.8 (ref 12–16)
INR BLD: 1.2 RATIO (ref 0.8–1.2)
KETONES, URINE: NEGATIVE MG/DL
LEUKOCYTE ESTERASE, URINE: NEGATIVE
LYMPHOCYTE %: 21.75 (ref 20–51.1)
LYMPHOCYTES ABSOLUTE: 0.81 (ref 1–5.5)
MCH RBC QN AUTO: 30.6 PG (ref 28.5–32.5)
MCHC RBC AUTO-ENTMCNC: 32.2 G/DL (ref 32–37)
MCV RBC AUTO: 95 FL (ref 80–94)
MONOCYTES %: 8.52 (ref 1.7–9.3)
MONOCYTES ABSOLUTE: 0.32 (ref 0.1–1)
MUCUS, URINE: NORMAL
MYOGLOBIN: 38.6 NG/ML (ref 20–100)
NEUTROPHILS %: 66.74 (ref 42.2–75.2)
NEUTROPHILS ABSOLUTE: 2.49 (ref 2–8.1)
NITRITE, URINE: NEGATIVE
OPIATE SCREEN, URINE: NEGATIVE
OXYCODONE SCREEN URINE: NEGATIVE
PDW BLD-RTO: 11.8 % (ref 8.5–15.5)
PH UA: 7 (ref 5–8.5)
PHENCYCLIDINE SCREEN URINE: NEGATIVE
PLATELET # BLD: 55.59 THOU/MM3 (ref 130–400)
POTASSIUM SERPL-SCNC: 3.8 MMOL/L (ref 3.6–5)
PROTEIN UA: NEGATIVE MG/DL
PROTHROMBIN TIME: 13.2 SEC (ref 9.3–12.5)
RBC URINE: NORMAL (ref 0–2)
RBC: 3.85 M/UL (ref 4.2–5.4)
SODIUM BLD-SCNC: 138 MMOL/L (ref 135–145)
SPECIFIC GRAVITY, URINE: 1.01 MG/DL (ref 1–1.03)
SQUAMOUS EPITHELIAL: NORMAL
TOTAL PROTEIN, SERUM: 7.3 G/DL (ref 6.3–8.2)
TRICHOMONAS, URINE: NORMAL
TRICYCLIC ANTIDEPRESSANTS, UR: NEGATIVE
TROPONIN I: 0.01 NG/ML (ref 0–0.03)
TROPONIN I: 0.42 NG/ML (ref 0–0.03)
UROBILINOGEN, URINE: 0.2 MG/DL (ref 0.2–1)
WBC URINE: NORMAL (ref 0–4)
WBC: 3.7 THOU/ML3 (ref 4.8–10.8)
YEAST, URINE: NORMAL

## 2021-05-23 LAB — CANNABINOIDS CONF, URINE: <15

## 2021-05-24 ENCOUNTER — TELEPHONE (OUTPATIENT)
Dept: FAMILY MEDICINE CLINIC | Age: 46
End: 2021-05-24

## 2021-05-24 RX ORDER — ATORVASTATIN CALCIUM 40 MG/1
40 TABLET, FILM COATED ORAL NIGHTLY
COMMUNITY
Start: 2021-05-22

## 2021-05-25 PROBLEM — R77.8 ELEVATED TROPONIN: Status: ACTIVE | Noted: 2021-05-20

## 2021-05-25 PROBLEM — R42 DIZZINESS: Status: ACTIVE | Noted: 2021-05-25

## 2021-05-25 PROBLEM — I95.1 ORTHOSTATIC HYPOTENSION: Status: ACTIVE | Noted: 2021-05-25

## 2021-05-25 PROBLEM — R79.89 ELEVATED TROPONIN: Status: ACTIVE | Noted: 2021-05-20

## 2021-05-26 ENCOUNTER — OFFICE VISIT (OUTPATIENT)
Dept: FAMILY MEDICINE CLINIC | Age: 46
End: 2021-05-26
Payer: COMMERCIAL

## 2021-05-26 ENCOUNTER — TELEPHONE (OUTPATIENT)
Dept: FAMILY MEDICINE CLINIC | Age: 46
End: 2021-05-26

## 2021-05-26 VITALS
HEART RATE: 76 BPM | OXYGEN SATURATION: 99 % | SYSTOLIC BLOOD PRESSURE: 154 MMHG | WEIGHT: 209 LBS | BODY MASS INDEX: 31.78 KG/M2 | DIASTOLIC BLOOD PRESSURE: 104 MMHG

## 2021-05-26 DIAGNOSIS — I10 ESSENTIAL HYPERTENSION: ICD-10-CM

## 2021-05-26 DIAGNOSIS — I21.4 NSTEMI (NON-ST ELEVATED MYOCARDIAL INFARCTION) (HCC): ICD-10-CM

## 2021-05-26 DIAGNOSIS — I24.0 OCCLUSION OF LAD (LEFT ANTERIOR DESCENDING) ARTERY (HCC): ICD-10-CM

## 2021-05-26 DIAGNOSIS — D50.9 IRON DEFICIENCY ANEMIA, UNSPECIFIED IRON DEFICIENCY ANEMIA TYPE: ICD-10-CM

## 2021-05-26 DIAGNOSIS — D69.6 THROMBOCYTOPENIA (HCC): ICD-10-CM

## 2021-05-26 DIAGNOSIS — Z13.220 SCREENING FOR HYPERLIPIDEMIA: ICD-10-CM

## 2021-05-26 DIAGNOSIS — M32.9 SYSTEMIC LUPUS ERYTHEMATOSUS, UNSPECIFIED SLE TYPE, UNSPECIFIED ORGAN INVOLVEMENT STATUS (HCC): ICD-10-CM

## 2021-05-26 DIAGNOSIS — Z09 HOSPITAL DISCHARGE FOLLOW-UP: Primary | ICD-10-CM

## 2021-05-26 DIAGNOSIS — G93.32 CHRONIC FATIGUE SYNDROME: ICD-10-CM

## 2021-05-26 DIAGNOSIS — G47.33 OSA (OBSTRUCTIVE SLEEP APNEA): ICD-10-CM

## 2021-05-26 DIAGNOSIS — Z98.890 S/P CARDIAC CATHETERIZATION: ICD-10-CM

## 2021-05-26 DIAGNOSIS — D61.818 PANCYTOPENIA (HCC): ICD-10-CM

## 2021-05-26 LAB
ALBUMIN/GLOBULIN RATIO: 1.43 G/DL
ALBUMIN: 4 G/DL (ref 3.5–5)
ALP BLD-CCNC: 46 UNITS/L (ref 38–126)
ALT SERPL-CCNC: 34 UNITS/L (ref 4–35)
ANION GAP SERPL CALCULATED.3IONS-SCNC: 12.8 MMOL/L
APTT: 37.5 SEC (ref 22–32)
AST SERPL-CCNC: 41 UNITS/L (ref 14–36)
BASOPHILS %: 0.9 (ref 0–3)
BASOPHILS ABSOLUTE: 0.02 (ref 0–0.3)
BILIRUB SERPL-MCNC: 0.2 MG/DL (ref 0.2–1.3)
BUN BLDV-MCNC: 17 MG/DL (ref 7–17)
CALCIUM SERPL-MCNC: 9.1 MG/DL (ref 8.4–10.2)
CHLORIDE BLD-SCNC: 105 MMOL/L (ref 98–120)
CO2: 26 MMOL/L (ref 22–31)
CREAT SERPL-MCNC: 0.9 MG/DL (ref 0.5–1)
CREATINE KINASE-MB INDEX: 0.4 % (ref 0–5)
CREATINE KINASE-MB INDEX: 0.4 % (ref 0–5)
CREATINE KINASE-MB: 0.46 NG/ML (ref 0–2.4)
CREATINE KINASE-MB: 0.49 NG/ML (ref 0–2.4)
CREATINE KINASE: 112 UNITS/L (ref 25–170)
CREATINE KINASE: 119 UNITS/L (ref 25–170)
CREATININE CLEARANCE: 77.95
EOSINOPHILS %: 3.22 (ref 0–10)
EOSINOPHILS ABSOLUTE: 0.07 (ref 0–1.1)
GFR CALCULATED: > 60
GLOBULIN: 2.8 G/DL
GLUCOSE: 105 MG/DL (ref 65–105)
HCT VFR BLD CALC: 30.2 % (ref 37–47)
HEMOGLOBIN: 10.1 (ref 12–16)
INR BLD: 1.2 RATIO (ref 0.8–1.2)
LYMPHOCYTE %: 32.75 (ref 20–51.1)
LYMPHOCYTES ABSOLUTE: 0.72 (ref 1–5.5)
MCH RBC QN AUTO: 29.8 PG (ref 28.5–32.5)
MCHC RBC AUTO-ENTMCNC: 33.6 G/DL (ref 32–37)
MCV RBC AUTO: 88.7 FL (ref 80–94)
MONOCYTES %: 12.75 (ref 1.7–9.3)
MONOCYTES ABSOLUTE: 0.28 (ref 0.1–1)
MYOGLOBIN: 43 NG/ML (ref 20–100)
NEUTROPHILS %: 50.38 (ref 42.2–75.2)
NEUTROPHILS ABSOLUTE: 1.11 (ref 2–8.1)
PDW BLD-RTO: 11.4 % (ref 8.5–15.5)
PLATELET # BLD: 53 THOU/MM3 (ref 130–400)
POTASSIUM SERPL-SCNC: 3.8 MMOL/L (ref 3.6–5)
PROTHROMBIN TIME: 14.1 SEC (ref 9.3–12.5)
RBC: 3.41 M/UL (ref 4.2–5.4)
SODIUM BLD-SCNC: 140 MMOL/L (ref 135–145)
TOTAL PROTEIN, SERUM: 6.8 G/DL (ref 6.3–8.2)
TROPONIN I: 0.03 NG/ML (ref 0–0.03)
TROPONIN I: 0.03 NG/ML (ref 0–0.03)
WBC: 2.2 THOU/ML3 (ref 4.8–10.8)

## 2021-05-26 PROCEDURE — 1111F DSCHRG MED/CURRENT MED MERGE: CPT | Performed by: NURSE PRACTITIONER

## 2021-05-26 PROCEDURE — 99496 TRANSJ CARE MGMT HIGH F2F 7D: CPT | Performed by: NURSE PRACTITIONER

## 2021-05-26 SDOH — ECONOMIC STABILITY: FOOD INSECURITY: WITHIN THE PAST 12 MONTHS, THE FOOD YOU BOUGHT JUST DIDN'T LAST AND YOU DIDN'T HAVE MONEY TO GET MORE.: NEVER TRUE

## 2021-05-26 ASSESSMENT — SOCIAL DETERMINANTS OF HEALTH (SDOH): HOW HARD IS IT FOR YOU TO PAY FOR THE VERY BASICS LIKE FOOD, HOUSING, MEDICAL CARE, AND HEATING?: NOT HARD AT ALL

## 2021-05-26 ASSESSMENT — ENCOUNTER SYMPTOMS
EYES NEGATIVE: 1
SHORTNESS OF BREATH: 0
WHEEZING: 0

## 2021-05-26 NOTE — TELEPHONE ENCOUNTER
Patient is still having issues and needs to be off work. Can Ric Ng write her an excuse? Please call patient.

## 2021-05-26 NOTE — PROGRESS NOTES
Post-Discharge Transitional Care Management Services or Hospital Follow Up      Jocelin Whittaker   YOB: 1975    Date of Office Visit:  5/26/2021  Date of Hospital Admission: 5/19/2021  Date of Hospital Discharge: 5/22/2021    Care management risk score Rising risk (score 2-5) and Complex Care (Scores >=6): 1     Non face to face  following discharge, date last encounter closed (first attempt may have been earlier): 5/24/2021  9:45 AM     Call initiated 2 business days of discharge: Yes    Patient Active Problem List   Diagnosis    Vitiligo    Hypertension    Chronic fatigue syndrome    Iron deficiency anemia    CALLY (obstructive sleep apnea)    Vitamin D deficiency    Right leg numbness    White matter abnormality on MRI of brain    Photophobia    Systemic lupus erythematosus (HCC)    Elevated troponin    Dizziness    Orthostatic hypotension    [facility]STEMI (non-ST elevated [facility] infarction) ([facility]    S/P cardiac catheterization    Occlusion of LAD (left anterior descending) artery (HCC)    Pancytopenia (HCC)    Thrombocytopenia (HCC)       Allergies   Allergen Reactions    Dexlansoprazole     Indomethacin     Tetanus Toxoid        Medications listed as ordered at the time of discharge from hospital      Medications marked \"taking\" at this time  Outpatient Medications Marked as Taking for the 5/26/21 encounter (Office Visit) with ZOE Alegria - CNP   Medication Sig Dispense Refill    atorvastatin (LIPITOR) 40 MG tablet Take 40 mg by mouth nightly      enoxaparin (LOVENOX) 100 MG/ML injection Inject 100 mg into the skin every 12 hours      Rimegepant Sulfate (NURTEC) 75 MG TBDP Take by mouth      aspirin 81 MG EC tablet Take 81 mg by mouth daily      Multiple Vitamin (MULTIVITAMIN ADULT PO) Take by mouth      Cetirizine-Pseudoephedrine (ALLERGY RELIEF D PO) Take by mouth      vitamin D (ERGOCALCIFEROL) 1.25 MG (24879 UT) CAPS capsule take 1 capsule by mouth every week 4 well-groomed. Comments: Appears uncomfortable -holding chest and grimacing   HENT:      Head: Normocephalic. Eyes:      Conjunctiva/sclera: Conjunctivae normal.   Neck:      Thyroid: No thyromegaly. Cardiovascular:      Rate and Rhythm: Normal rate and regular rhythm. Heart sounds: Normal heart sounds. Pulmonary:      Effort: Pulmonary effort is normal.      Breath sounds: Normal breath sounds. No wheezing. Abdominal:      Tenderness: There is no abdominal tenderness. Musculoskeletal:      Cervical back: Neck supple. Right lower leg: No edema. Left lower leg: No edema. Skin:     Capillary Refill: Capillary refill takes less than 2 seconds. Neurological:      Mental Status: She is alert and oriented to person, place, and time. Psychiatric:         Behavior: Behavior is cooperative. Assessment/Plan:   Diagnosis Orders   1. Hospital discharge follow-up  UT DISCHARGE MEDS RECONCILED W/ CURRENT OUTPATIENT MED LIST   2. NSTEMI (non-ST elevated myocardial infarction) ([address]     3. S/P cardiac catheterization     4. Occlusion of LAD (left anterior descending) artery (HCC)     5. Pancytopenia ([address]     6. Thrombocytopenia ([address]     7. Systemic lupus erythematosus, unspecified SLE type, unspecified organ involvement status ([address]     8. Essential hypertension     9. CALLY (obstructive sleep apnea)     10. Iron deficiency anemia, unspecified iron deficiency anemia type     11. Chronic fatigue syndrome     12.  Screening for hyperlipidemia         Medical Decision Making: high complexity     Electronically signed by ZOE Gallardo CNP on 5/26/2021 at 8:23 PM.

## 2021-06-01 ENCOUNTER — TELEPHONE (OUTPATIENT)
Dept: FAMILY MEDICINE CLINIC | Age: 46
End: 2021-06-01

## 2021-06-01 ENCOUNTER — OFFICE VISIT (OUTPATIENT)
Dept: FAMILY MEDICINE CLINIC | Age: 46
End: 2021-06-01
Payer: COMMERCIAL

## 2021-06-01 VITALS
BODY MASS INDEX: 31.46 KG/M2 | HEART RATE: 64 BPM | OXYGEN SATURATION: 94 % | WEIGHT: 206.9 LBS | DIASTOLIC BLOOD PRESSURE: 74 MMHG | SYSTOLIC BLOOD PRESSURE: 106 MMHG

## 2021-06-01 DIAGNOSIS — M32.9 SYSTEMIC LUPUS ERYTHEMATOSUS, UNSPECIFIED SLE TYPE, UNSPECIFIED ORGAN INVOLVEMENT STATUS (HCC): ICD-10-CM

## 2021-06-01 DIAGNOSIS — G93.32 CHRONIC FATIGUE SYNDROME: ICD-10-CM

## 2021-06-01 DIAGNOSIS — D50.9 IRON DEFICIENCY ANEMIA, UNSPECIFIED IRON DEFICIENCY ANEMIA TYPE: ICD-10-CM

## 2021-06-01 DIAGNOSIS — K59.00 CONSTIPATION, UNSPECIFIED CONSTIPATION TYPE: ICD-10-CM

## 2021-06-01 DIAGNOSIS — R07.89 PRESSURE IN CHEST: Primary | ICD-10-CM

## 2021-06-01 DIAGNOSIS — E55.9 VITAMIN D DEFICIENCY: ICD-10-CM

## 2021-06-01 DIAGNOSIS — D69.6 THROMBOCYTOPENIA (HCC): ICD-10-CM

## 2021-06-01 DIAGNOSIS — G47.33 OSA (OBSTRUCTIVE SLEEP APNEA): ICD-10-CM

## 2021-06-01 DIAGNOSIS — I24.0 OCCLUSION OF LAD (LEFT ANTERIOR DESCENDING) ARTERY (HCC): ICD-10-CM

## 2021-06-01 DIAGNOSIS — Z98.890 S/P CARDIAC CATHETERIZATION: ICD-10-CM

## 2021-06-01 DIAGNOSIS — Z13.220 SCREENING FOR HYPERLIPIDEMIA: ICD-10-CM

## 2021-06-01 DIAGNOSIS — I10 ESSENTIAL HYPERTENSION: ICD-10-CM

## 2021-06-01 DIAGNOSIS — D61.818 PANCYTOPENIA (HCC): ICD-10-CM

## 2021-06-01 PROCEDURE — 99214 OFFICE O/P EST MOD 30 MIN: CPT | Performed by: NURSE PRACTITIONER

## 2021-06-01 RX ORDER — ISOSORBIDE MONONITRATE 30 MG/1
60 TABLET, EXTENDED RELEASE ORAL
COMMUNITY
Start: 2021-05-26 | End: 2021-09-17

## 2021-06-01 ASSESSMENT — ENCOUNTER SYMPTOMS
SHORTNESS OF BREATH: 0
NAUSEA: 1
ORTHOPNEA: 0
CONSTIPATION: 1
ABDOMINAL PAIN: 0
DIARRHEA: 0
WHEEZING: 0
EYES NEGATIVE: 1
VOMITING: 0
COUGH: 0

## 2021-06-01 NOTE — TELEPHONE ENCOUNTER
Patient reported [facility] in [place] as a \"new\" pharmacy for her during her visit today. Please have her check with her previous pharmacy.

## 2021-06-01 NOTE — TELEPHONE ENCOUNTER
Called patient. She already checked with previous pharmacy. Spoke with Lola Garcia and she advised patient follow up with Cardiology regarding this. Relayed to patient.

## 2021-06-01 NOTE — PROGRESS NOTES
[address] [facility]  [address] [facility]  Dept: [phone]  Dept Fax: [phone]    Mario Rogers is a 50 y.o. female who presents today for her medical conditions/complaints as noted below. Mario Rogers c/o of Follow-Up from [facility] ER chest pain still reports that has some slight chest pains but was started on isosorbide )      HPI:   Patient presents to the office for follow up after an episode of chest pain. She was in the office last week for a follow up after discharge from the [facility] for NSTEMI with distal LAD occlusion. Heart cath results indicated an embolic occlusion of the distal LAD. Mild nonobstructive CAD. Anticoagulation is complicated by chronic thrombocytopenia. She also has a fairly recent diagnoses of lupus, treated with hydroxychloroquine 200 mg twice daily. Hematology consulted during her admission. She follows regularly with hematology (Dr. Wero Curry) in [place]. She developed chest pain while in the office and was sent to [facility] ED for further evaluation. Today she reports feeling better. She continues to complain of chest pressure and fatigue. She has a follow up scheduled this week with her cardiologist. She is concerned about her blood thinner. Patient states she took her last dose of Lovenox 3 days ago. She is not taking any other type of blood thinner. Chest Pain   This is a new problem. The current episode started 1 to 4 weeks ago. The problem occurs daily. The problem has been waxing and waning. The pain is mild. The quality of the pain is described as pressure. The pain does not radiate. Associated symptoms include malaise/fatigue and nausea (occasional). Pertinent negatives include no abdominal pain, cough, dizziness, fever, headaches, lower extremity edema, orthopnea, palpitations, PND, shortness of breath, vomiting or weakness. The pain is aggravated by nothing.  Prior diagnostic workup includes ferrous sulfate (IRON 325) 325 (65 Fe) MG tablet 325 mg 2 times daily      naproxen (NAPROSYN) 500 MG tablet Take 1 tablet by mouth 2 times daily as needed for Pain Take one po BID. Take with food. 60 tablet 0    potassium chloride (KLOR-CON M) 20 MEQ extended release tablet Take 1 tablet by mouth daily for 3 days 3 tablet 0    gabapentin (NEURONTIN) 300 MG capsule Take 1 capsule by mouth 3 times daily for 180 days. Intended supply: 90 days (Patient taking differently: Take 300 mg by mouth daily. Intended supply: 90 days) 180 capsule 0     No current facility-administered medications for this visit. Allergies   Allergen Reactions    Dexlansoprazole     Indomethacin     Tetanus Toxoid        Health Maintenance   Topic Date Due    Hepatitis C screen  Never done    COVID-19 Vaccine (1) Never done    Cervical cancer screen  Never done    Lipid screen  05/19/2021    HIV screen  01/16/2029 (Originally 8/5/1990)    Flu vaccine (Season Ended) 09/01/2021    Potassium monitoring  05/26/2022    Creatinine monitoring  05/26/2022    Diabetes screen  07/01/2023    Hepatitis A vaccine  Aged Out    Hepatitis B vaccine  Aged Out    Hib vaccine  Aged Out    Meningococcal (ACWY) vaccine  Aged Out    Pneumococcal 0-64 years Vaccine  Aged Out       Subjective:      Review of Systems   Constitutional: Positive for appetite change (decreased), fatigue and malaise/fatigue. Negative for chills and fever. HENT: Negative. Eyes: Negative. Respiratory: Negative for cough, shortness of breath and wheezing. Cardiovascular: Positive for chest pain (tight squeeze upper left chest). Negative for palpitations, orthopnea, leg swelling and PND. Gastrointestinal: Positive for constipation (states due to iron) and nausea (occasional). Negative for abdominal pain, diarrhea and vomiting. Endocrine: Negative for cold intolerance, heat intolerance, polydipsia, polyphagia and polyuria. Genitourinary: Negative. Mild depression, 10-14 = Moderate depression, 15-19 = Moderately severe depression, 20-27 = Severe depression     Lab Results   Component Value Date     05/26/2021    K 3.8 05/26/2021     05/26/2021    CO2 26 05/26/2021    BUN 17 05/26/2021    CREATININE 0.9 05/26/2021    GLUCOSE 105 05/26/2021    CALCIUM 9.1 05/26/2021    PROT 7.1 04/09/2021    LABALBU 4.0 05/26/2021    BILITOT 0.2 05/26/2021    ALKPHOS 46 05/26/2021    AST 41 (H) 05/26/2021    ALT 34 05/26/2021    LABGLOM > 60.0 05/26/2021    AGRATIO 1.2 09/16/2019    GLOB 2.8 05/26/2021     Lab Results   Component Value Date    LABA1C 5.2 07/01/2020       Lab Results   Component Value Date    GLUF 97 03/15/2019    CREATININE 0.9 05/26/2021       Assessment:     1. Pressure in chest    2. Chronic fatigue syndrome    3. Constipation, unspecified constipation type    4. CALLY (obstructive sleep apnea)    5. Thrombocytopenia ([facility]    6. S/P cardiac catheterization    7. Iron deficiency anemia, unspecified iron deficiency anemia type    8. Pancytopenia ([facility]    9. Occlusion of LAD (left anterior descending) artery ([facility]    10. Systemic lupus erythematosus, unspecified SLE type, unspecified organ involvement status ([facility]    11. Vitamin D deficiency    12. Essential hypertension    13. Screening for hyperlipidemia        Plan:     Orders Placed This Encounter   Procedures    Lipid, Fasting     Standing Status:   Future     Standing Expiration Date:   6/1/2022        Review of records in [facility] ([place]) indicates patient should be taking Eliquis 5 mg daily. Instructed to call pharmacy and inquire about the prescription of Eliquis. Discussed use, benefit, and side effects of prescribed medications. All patient questions answered. Patient voiced understanding. Health Maintenance reviewed. Instructed to continue current medications, diet and exercise. Patient agreed with treatment plan. Follow up as directed.      Return if symptoms worsen or fail to improve. This note was generated completely or in part utilizing Dragon dictation speech recognition software. Occasionally, words are mistranscribed and despite editing, the text may contain inaccuracies due to incorrect word recognition. If further clarification is needed please contact the office at [phone].     Electronically signed by ZOE Laureano CNP on 6/1/2021

## 2021-06-01 NOTE — TELEPHONE ENCOUNTER
[facility] did not send any blood thinner to [facility] in [place]. She checked with pharmacy.

## 2021-06-07 LAB
ALBUMIN/GLOBULIN RATIO: 1.6 G/DL
ALBUMIN: 4.7 G/DL (ref 3.5–5)
ALP BLD-CCNC: 49 UNITS/L (ref 38–126)
ALT SERPL-CCNC: 25 UNITS/L (ref 4–35)
ANION GAP SERPL CALCULATED.3IONS-SCNC: 11.1 MMOL/L
APTT: 29.2 SEC (ref 22–32)
AST SERPL-CCNC: 26 UNITS/L (ref 14–36)
BASOPHILS %: 0.93 (ref 0–3)
BASOPHILS ABSOLUTE: 0.03 (ref 0–0.3)
BILIRUB SERPL-MCNC: 0.4 MG/DL (ref 0.2–1.3)
BILIRUBIN DIRECT: 0 MG/DL (ref 0–0.3)
BUN BLDV-MCNC: 15 MG/DL (ref 7–17)
CALCIUM SERPL-MCNC: 9.7 MG/DL (ref 8.4–10.2)
CHLORIDE BLD-SCNC: 100 MMOL/L (ref 98–120)
CO2: 27 MMOL/L (ref 22–31)
CREAT SERPL-MCNC: 1 MG/DL (ref 0.5–1)
EOSINOPHILS %: 3.47 (ref 0–10)
EOSINOPHILS ABSOLUTE: 0.1 (ref 0–1.1)
GFR CALCULATED: > 60
GLOBULIN: 3 G/DL
GLUCOSE: 89 MG/DL (ref 65–105)
HCT VFR BLD CALC: 35.3 % (ref 37–47)
HEMOGLOBIN: 11.8 (ref 12–16)
INR BLD: 1.9 RATIO (ref 0.8–1.2)
LYMPHOCYTE %: 32.74 (ref 20–51.1)
LYMPHOCYTES ABSOLUTE: 0.98 (ref 1–5.5)
MCH RBC QN AUTO: 31.1 PG (ref 28.5–32.5)
MCHC RBC AUTO-ENTMCNC: 33.5 G/DL (ref 32–37)
MCV RBC AUTO: 92.9 FL (ref 80–94)
MONOCYTES %: 7.86 (ref 1.7–9.3)
MONOCYTES ABSOLUTE: 0.24 (ref 0.1–1)
NEUTROPHILS %: 55 (ref 42.2–75.2)
NEUTROPHILS ABSOLUTE: 1.65 (ref 2–8.1)
PDW BLD-RTO: 11.9 % (ref 8.5–15.5)
PLATELET # BLD: 81.4 THOU/MM3 (ref 130–400)
POTASSIUM SERPL-SCNC: 4.1 MMOL/L (ref 3.6–5)
PROTHROMBIN TIME: 20.9 SEC (ref 9.3–12.5)
RBC: 3.8 M/UL (ref 4.2–5.4)
SODIUM BLD-SCNC: 138 MMOL/L (ref 135–145)
TOTAL PROTEIN, SERUM: 7.7 G/DL (ref 6.3–8.2)
WBC: 3 THOU/ML3 (ref 4.8–10.8)

## 2021-06-18 LAB
BASOPHILS %: 1.16 (ref 0–3)
BASOPHILS ABSOLUTE: 0.03 (ref 0–0.3)
CHOLESTEROL/HDL RATIO: 2.47 RATIO (ref 0–4.5)
CHOLESTEROL: 111 MG/DL (ref 50–200)
EOSINOPHILS %: 4.54 (ref 0–10)
EOSINOPHILS ABSOLUTE: 0.13 (ref 0–1.1)
HCT VFR BLD CALC: 36.4 % (ref 37–47)
HDLC SERPL-MCNC: 45 MG/DL (ref 36–68)
HEMOGLOBIN: 11.7 (ref 12–16)
LDL CHOLESTEROL CALCULATED: 52.4 MG/DL (ref 0–160)
LYMPHOCYTE %: 32.43 (ref 20–51.1)
LYMPHOCYTES ABSOLUTE: 0.95 (ref 1–5.5)
MCH RBC QN AUTO: 30 PG (ref 28.5–32.5)
MCHC RBC AUTO-ENTMCNC: 32.1 G/DL (ref 32–37)
MCV RBC AUTO: 93.6 FL (ref 80–94)
MONOCYTES %: 11.76 (ref 1.7–9.3)
MONOCYTES ABSOLUTE: 0.34 (ref 0.1–1)
NEUTROPHILS %: 50.11 (ref 42.2–75.2)
NEUTROPHILS ABSOLUTE: 1.47 (ref 2–8.1)
PDW BLD-RTO: 11.5 % (ref 8.5–15.5)
PLATELET # BLD: 117 THOU/MM3 (ref 130–400)
RBC: 3.89 M/UL (ref 4.2–5.4)
TRIGL SERPL-MCNC: 68 MG/DL (ref 10–250)
VLDLC SERPL CALC-MCNC: 14 MG/DL (ref 0–50)
WBC: 2.9 THOU/ML3 (ref 4.8–10.8)

## 2021-06-24 PROBLEM — R77.8 ELEVATED TROPONIN: Status: RESOLVED | Noted: 2021-05-20 | Resolved: 2021-06-24

## 2021-06-24 PROBLEM — R79.89 ELEVATED TROPONIN: Status: RESOLVED | Noted: 2021-05-20 | Resolved: 2021-06-24

## 2021-06-28 NOTE — PROGRESS NOTES
[facility] - Initial Office Consult Note  Today's Date and Time: 6/29/2021, 5:09 PM    Diagnostic Impression :   · Hx of SLE  · Previous Lyme Exposure  · Chronic fatigue   · Essential HTN  · CALLY  · White matter abnormality on MRI of the brain  · Chronic pancytopenia  · S/P NSTEMI 5/26/2021    Recommendations   · Monitor off antibiotics  · Repeat CBC and Lyme's disease test in 2 months  · Office follow-up after the above are obtained    Chief complaint/reason for consultation:     Chief Complaint   Patient presents with    Lyme Disease     no other conerns        History of Present Illness:   Vicky John is a 50y.o.-year-old  female who was initially evaluated on 6/29/2021. Patient seen at the request of Dr. Mark Krishnan. INITIAL HISTORY FROM OFFICE VISIT ON 4/27/21    This is a 50 y.o. female w/ PMHx SLE, ANÍBAL, CALLY, HTN, eczema, and chronic thrombocytopenia who presents today with multiple complaints of ongoing symptoms with a recent diagnosis of Lyme disease exposure. Pt states that for the past year she has been feeling fatigue, numbness of the Rt leg, blurriness and blind spots in her vision, occasional chills, lymphadenopathy. She follows with Dr. Shae Brewer at [facility] who referred her to Dr. Zoran Lawler (rheumatologist) and Dr. Mark Krishnan (neurologist). Patient has undergone an extensive work up including imaging of the brain showing previous infarctions as well as an MRI with hyperintensities in the pericallosal and periventricular regions concerning for MS. Patient was recommended undergo further workup for MS but she declined. She was recently diagnosed with SLE a few months prior and has been taking Hydroxychloroquine 200 BID. She is on several antihypertensives and her BP today is elevated with DBP >100. Patient was also found to have elevated Lyme IgG titers but non reactive IgM titers.  She believes that she may have been exposed in an outdoor area last fall to a tick bite but does not remember any characteristic rashes. Physical examination is significant for a soft mass 2x2cm in her Lt supraclavicular space that is likely a lipoma. Patient states that she has had a Lt axillary lymph node removed in the past with benign findings. She further endorses difficulty with walking but she presents with normal gait on examination. Motor strength is preserved and 5/5 on all four extremities. ROS positive for chills, dizziness, vision changes, fatigue, numbness, problems with memory (short term recall). Denies fever, nausea, chest pain, abdominal pain, dysuria, palpitations. DISCUSSION:  · Patient has several medical issues that are ongoing  · Review her laboratory work shows that she has had different degrees of pancytopenia for several years. She is not sure that a cause has been determined  · To his being prescribed treatment for iron deficiency anemia but that diagnosis would not explain the overall picture of pancytopenia. · She has also recently been diagnosed as having systemic lupus erythematosus and has a started treatment with hydroxychloroquine. Tolerating such treatment well. Unfortunately I do not have access to the laboratory work that accompanied the diagnosis. · She has also a history of chronic fatigue. · In addition she has a diagnosis of hypertension. More recently control of the hypertension seems to have become more difficult. · She indicates that at one point a diagnosis of a CVA was made. However on repeat scanning of her brain apparently the area of abnormality was no longer found. · She has an MRI of the brain which shows white matter abnormalities. There has been concern for the possibility of multiple sclerosis. Additional testing was requested by Dr. Marco Bahena but apparently the patient declined to proceed. · As part of her testing for possible MS she had a Lyme's Western blot performed.   The review of the results shows 5 areas of precipitation in the IgG test done in the IgM test.  By these criteria the patient would be considered to have had prior exposure to Lyme's disease, but not to have an acute infection. · She reports that she has not had any antibiotic treatment for this condition. I suggested to her and her  that it would be prudent to proceed with a course of at least 1 month of oral doxycycline to try to control any residual activity from the Lyme's infection prevent any potential further injury to the central nervous system. · I indicated to the patient that I do not know if any of the other medical problems she is experiencing have any relationship to the Lyme's disease. · I have suggested that she continue her follow-up with the different specialists that are looking after her neurological and rheumatological problems as well as her PCP. · I will plan to see her in 2 months after she has had 1 month of therapy with doxycycline. Emphasized that the treatment with doxycycline may not produce any improvement on her current symptoms. The treatment prescribed is may lead to prevent any potential future damage to the tissues by a previously untreated Lyme's infection. · PLAN:  Patient was prescribed Doxycycline 100 mg BID for 4 weeks. · Will f/u in 2 months to assess for clinical improvements. CURRENT EVALUATION 6/29/2021     Patient was seen in the office accompanied by her . He indicates that she completed 1 month of doxycycline with a delay of about 1 week without experiencing any difficulties. She indicates that there was some degree of improvement with the medication in terms of her fatigue. However she finds it difficult to evaluate the effect because in the interim she developed a non-ST MI on 5/26/2021. She reports that she have sudden development of chest pain which was prolonged.   She has difficulty describing the nature of the pain but indicates it was a the Disp: , Rfl:     aspirin 81 MG EC tablet, Take 81 mg by mouth daily, Disp: , Rfl:     Multiple Vitamin (MULTIVITAMIN ADULT PO), Take by mouth, Disp: , Rfl:     Cetirizine-Pseudoephedrine (ALLERGY RELIEF D PO), Take by mouth, Disp: , Rfl:     vitamin D (ERGOCALCIFEROL) 1.25 MG (776[address] [place]) CAPS capsule, take 1 capsule by mouth every week, Disp: 4 capsule, Rfl: 1    cloNIDine (CATAPRES) 0.1 MG tablet, take 1 tablet by mouth once daily if needed FOR BLOOD PRESSURE SBP OVER 160, Disp: 30 tablet, Rfl: 3    hydroxychloroquine (PLAQUENIL) 200 MG tablet, take 1 tablet by mouth twice a day, Disp: , Rfl:     labetalol (NORMODYNE) 100 MG tablet, take 1 tablet by mouth twice a day, Disp: , Rfl:     NIFEdipine (PROCARDIA XL) 90 MG extended release tablet, take 1 tablet by mouth once daily, Disp: , Rfl:     olmesartan-hydroCHLOROthiazide (BENICAR HCT) 40-25 MG per tablet, take 1 tablet by mouth once daily, Disp: , Rfl:     ferrous sulfate (IRON 325) 325 (65 Fe) MG tablet, 325 mg 2 times daily, Disp: , Rfl:     naproxen (NAPROSYN) 500 MG tablet, Take 1 tablet by mouth 2 times daily as needed for Pain Take one po BID. Take with food. , Disp: 60 tablet, Rfl: 0    enoxaparin (LOVENOX) 100 MG/ML injection, Inject 100 mg into the skin every 12 hours (Patient not taking: Reported on 6/29/2021), Disp: , Rfl:     potassium chloride (KLOR-CON M) 20 MEQ extended release tablet, Take 1 tablet by mouth daily for 3 days, Disp: 3 tablet, Rfl: 0    gabapentin (NEURONTIN) 300 MG capsule, Take 1 capsule by mouth 3 times daily for 180 days. Intended supply: 90 days (Patient taking differently: Take 300 mg by mouth daily.  Intended supply: 90 days), Disp: 180 capsule, Rfl: 0     Social History:     Social History     Socioeconomic History    Marital status:      Spouse name: Not on file    Number of children: Not on file    Years of education: Not on file    Highest education level: Not on file   Occupational History    Not on file   Tobacco Use    Smoking status: Never Smoker    Smokeless tobacco: Never Used   Vaping Use    Vaping Use: Never used   Substance and Sexual Activity    Alcohol use: Not on file    Drug use: Not on file    Sexual activity: Not on file   Other Topics Concern    Not on file   Social History Narrative    Not on file     Social Determinants of Health     Financial Resource Strain: Low Risk     Difficulty of Paying Living Expenses: Not hard at all   Food Insecurity: No Food Insecurity    Worried About Running Out of Food in the Last Year: Never true    [address] in the Last Year: Never true   Transportation Needs:     Lack of Transportation (Medical):  Lack of Transportation (Non-Medical):    Physical Activity:     Days of Exercise per Week:     Minutes of Exercise per Session:    Stress:     Feeling of Stress :    Social Connections:     Frequency of Communication with Friends and Family:     Frequency of Social Gatherings with Friends and Family:     Attends [employer] Services:     Active Member of Clubs or Organizations:     Attends Club or Organization Meetings:     Marital Status:    Intimate Partner Violence:     Fear of Current or Ex-Partner:     Emotionally Abused:     Physically Abused:     Sexually Abused:        Family History:   History reviewed. No pertinent family history. Allergies:   Dexlansoprazole, Indomethacin, and Tetanus toxoid     Review of Systems:   Constitutional: No fevers or chills. No systemic complaints  Head: No headaches  Eyes: No double vision or blurry vision. ENT: No sore throat or runny nose. . No hearing loss, tinnitus or vertigo. Cardiovascular: No chest pain or palpitations. No shortness of breath. No TABARES  Lung: No shortness of breath or cough. No sputum production  Abdomen: No nausea, vomiting, diarrhea, or abdominal pain. .  Genitourinary: No increased urinary frequency, or dysuria. No hematuria.  No suprapubic or CVA pain  Musculoskeletal: No muscle aches or pains. No joint effusions, swelling or deformities  Hematologic: No bleeding or bruising. Neurologic: No headache, weakness, numbness, or tingling except for the numbness on the right leg    Physical Examination :   /85 (Site: Left Upper Arm, Position: Sitting, Cuff Size: Medium Adult)   Pulse 74   Temp 98.1 °F (36.7 °C) (Temporal)   Resp 16   Ht 5' 9\" (1.753 m)   Wt 201 lb (91.2 kg)   SpO2 98% Comment: room air at rest  BMI 29.68 kg/m²    General Appearance: Awake, alert, and in no apparent distress  Head:  Normocephalic, no trauma  Eyes: Pupils equal, round, reactive, to light and accommodation; extraocular movements intact; sclera anicteric; conjunctivae pink. No embolic phenomena. ENT: Oropharynx clear, without erythema, exudate, or thrush. No tenderness of sinuses. Mouth/throat: mucosa pink and moist. No lesions. Dentition in good repair. Neck:Supple, without lymphadenopathy. Thyroid normal, No bruits. Pulmonary/Chest: Clear to auscultation, without wheezes, rales, or rhonchi. No dullness to percussion. Cardiovascular: Regular rate and rhythm without murmurs, rubs, or gallops. Abdomen: Soft, non tender. Bowel sounds normal. No organomegaly  All four Extremities: No cyanosis, clubbing, edema, or effusions. Neurologic: No gross sensory or motor deficits. Skin: Warm and dry with good turgor. No signs of peripheral arterial or venous insufficiency.     Medical Decision Making:   I have independently reviewed/ordered the following labs:    CBC with Differential:  Lab Results   Component Value Date    WBC 2.9 06/18/2021    WBC 3.0 06/07/2021    WBC 3.79 09/16/2019    HGB 11.7 06/18/2021    HGB 11.8 06/07/2021    HCT 36.4 06/18/2021    HCT 35.3 06/07/2021    .0 06/18/2021    PLT 81.40 06/07/2021    LYMPHOPCT 32.43 06/18/2021    LYMPHOPCT 32.74 06/07/2021    MONOPCT 10.7 02/23/2020    MONOPCT 16.4 11/19/2019    EOSPCT 3.5 02/23/2020    EOSPCT 3.1 11/19/2019     BMP:   Lab

## 2021-06-29 ENCOUNTER — OFFICE VISIT (OUTPATIENT)
Dept: INFECTIOUS DISEASES | Age: 46
End: 2021-06-29
Payer: COMMERCIAL

## 2021-06-29 VITALS
BODY MASS INDEX: 29.77 KG/M2 | HEART RATE: 74 BPM | DIASTOLIC BLOOD PRESSURE: 85 MMHG | WEIGHT: 201 LBS | RESPIRATION RATE: 16 BRPM | SYSTOLIC BLOOD PRESSURE: 122 MMHG | HEIGHT: 69 IN | TEMPERATURE: 98.1 F | OXYGEN SATURATION: 98 %

## 2021-06-29 DIAGNOSIS — A69.20 LYME DISEASE: Primary | ICD-10-CM

## 2021-06-29 PROCEDURE — 99214 OFFICE O/P EST MOD 30 MIN: CPT | Performed by: INTERNAL MEDICINE

## 2021-06-29 RX ORDER — APIXABAN 5 MG/1
TABLET, FILM COATED ORAL
COMMUNITY
Start: 2021-06-07

## 2021-07-01 RX ORDER — DOXYCYCLINE HYCLATE 100 MG
TABLET ORAL
Qty: 28 TABLET | OUTPATIENT
Start: 2021-07-01

## 2021-07-01 NOTE — TELEPHONE ENCOUNTER
Dr Verena Adkins, per last dictation recommendation was to monitor off antibiotics. I refused refill request due to this. Please make any changes needed. Thank you.

## 2021-07-15 LAB
BASOPHILS %: 0.9 (ref 0–3)
BASOPHILS ABSOLUTE: 0.04 (ref 0–0.3)
EOSINOPHILS %: 2.59 (ref 0–10)
EOSINOPHILS ABSOLUTE: 0.1 (ref 0–1.1)
HCT VFR BLD CALC: 33.7 % (ref 37–47)
HEMOGLOBIN: 10.9 (ref 12–16)
LYMPHOCYTE %: 23.98 (ref 20–51.1)
LYMPHOCYTES ABSOLUTE: 0.95 (ref 1–5.5)
MCH RBC QN AUTO: 30.4 PG (ref 28.5–32.5)
MCHC RBC AUTO-ENTMCNC: 32.3 G/DL (ref 32–37)
MCV RBC AUTO: 94 FL (ref 80–94)
MONOCYTES %: 9.51 (ref 1.7–9.3)
MONOCYTES ABSOLUTE: 0.38 (ref 0.1–1)
NEUTROPHILS %: 63.02 (ref 42.2–75.2)
NEUTROPHILS ABSOLUTE: 2.49 (ref 2–8.1)
PDW BLD-RTO: 12.2 % (ref 8.5–15.5)
PLATELET # BLD: 153.1 THOU/MM3 (ref 130–400)
RBC: 3.59 M/UL (ref 4.2–5.4)
WBC: 3.9 THOU/ML3 (ref 4.8–10.8)

## 2021-07-30 LAB
ALBUMIN/GLOBULIN RATIO: 1.59 G/DL
ALBUMIN: 4.3 G/DL (ref 3.5–5)
ALP BLD-CCNC: 46 UNITS/L (ref 38–126)
ALT SERPL-CCNC: 14 UNITS/L (ref 4–35)
ANION GAP SERPL CALCULATED.3IONS-SCNC: 13.3 MMOL/L
AST SERPL-CCNC: 23 UNITS/L (ref 14–36)
BASOPHILS %: 0.94 (ref 0–3)
BASOPHILS ABSOLUTE: 0.04 (ref 0–0.3)
BILIRUB SERPL-MCNC: 0.4 MG/DL (ref 0.2–1.3)
BUN BLDV-MCNC: 20 MG/DL (ref 7–17)
CALCIUM SERPL-MCNC: 9.4 MG/DL (ref 8.4–10.2)
CHLORIDE BLD-SCNC: 101 MMOL/L (ref 98–120)
CO2: 26 MMOL/L (ref 22–31)
CREAT SERPL-MCNC: 1.3 MG/DL (ref 0.5–1)
CREATINE KINASE-MB INDEX: 0.3 % (ref 0–5)
CREATINE KINASE-MB: 0.47 NG/ML (ref 0–2.4)
CREATINE KINASE: 185 UNITS/L (ref 25–170)
CREATININE CLEARANCE: 53.97
EOSINOPHILS %: 1.46 (ref 0–10)
EOSINOPHILS ABSOLUTE: 0.06 (ref 0–1.1)
GFR CALCULATED: 56.5
GLOBULIN: 2.7 G/DL
GLUCOSE: 101 MG/DL (ref 65–105)
HCT VFR BLD CALC: 25.7 % (ref 37–47)
HEMOGLOBIN: 8.7 (ref 12–16)
LYMPHOCYTE %: 19.37 (ref 20–51.1)
LYMPHOCYTES ABSOLUTE: 0.85 (ref 1–5.5)
MCH RBC QN AUTO: 31.8 PG (ref 28.5–32.5)
MCHC RBC AUTO-ENTMCNC: 33.7 G/DL (ref 32–37)
MCV RBC AUTO: 94.4 FL (ref 80–94)
MONOCYTES %: 9.82 (ref 1.7–9.3)
MONOCYTES ABSOLUTE: 0.43 (ref 0.1–1)
MYOGLOBIN: 68.8 NG/ML (ref 20–100)
NEUTROPHILS %: 68.41 (ref 42.2–75.2)
NEUTROPHILS ABSOLUTE: 2.99 (ref 2–8.1)
PDW BLD-RTO: 14.1 % (ref 8.5–15.5)
PLATELET # BLD: 240.9 THOU/MM3 (ref 130–400)
POTASSIUM SERPL-SCNC: 3.3 MMOL/L (ref 3.6–5)
RBC: 2.72 M/UL (ref 4.2–5.4)
SODIUM BLD-SCNC: 137 MMOL/L (ref 135–145)
TOTAL PROTEIN, SERUM: 7 G/DL (ref 6.3–8.2)
TROPONIN I: < 0.012 NG/ML (ref 0–0.03)
WBC: 4.4 THOU/ML3 (ref 4.8–10.8)

## 2021-08-09 ENCOUNTER — OFFICE VISIT (OUTPATIENT)
Dept: FAMILY MEDICINE CLINIC | Age: 46
End: 2021-08-09
Payer: COMMERCIAL

## 2021-08-09 VITALS
HEART RATE: 54 BPM | WEIGHT: 202 LBS | OXYGEN SATURATION: 99 % | DIASTOLIC BLOOD PRESSURE: 84 MMHG | BODY MASS INDEX: 29.83 KG/M2 | SYSTOLIC BLOOD PRESSURE: 128 MMHG

## 2021-08-09 DIAGNOSIS — S76.111A STRAIN OF RIGHT QUADRICEPS MUSCLE, INITIAL ENCOUNTER: Primary | ICD-10-CM

## 2021-08-09 PROCEDURE — 99212 OFFICE O/P EST SF 10 MIN: CPT | Performed by: NURSE PRACTITIONER

## 2021-08-09 NOTE — PROGRESS NOTES
[address] [address] [address] [facility]  Dept: [phone]  Dept Fax: [phone]    Maureen Askew is a 48 y.o. female who presents today for her medical conditions/complaints as noted below. Maureen Askew c/o of Leg Pain (c/o right thigh pain for about three weeks no injury states walking up stairs is very painful has not tried any ice or heat no burning sensation)      HPI:   Having throbbing pain to right anterior thigh. Started 3 weeks ago. She is currently working with physical therapy and completes stretching 3 times weekly. She has not tried taking any medication for her discomfort. She is planning on having a hysterectomy with Dr. Anita Schmitz for heavy bleeding and fibroids. Procedure has not been scheduled. Leg Pain   The incident occurred more than 1 week ago (3 weeks). The incident occurred at home. There was no injury mechanism. The pain is present in the right thigh. The quality of the pain is described as aching. The pain is moderate. The pain has been intermittent (Worse in the afternoon) since onset. Pertinent negatives include no inability to bear weight. Exacerbated by: Going upstairs. She has tried nothing for the symptoms. The treatment provided no relief. BP Readings from Last 3 Encounters:   08/09/21 128/84   06/29/21 122/85   06/01/21 106/74              ([id number])    Pulse Readings from Last 3 Encounters:   08/09/21 54   06/29/21 74   06/01/21 64        Wt Readings from Last 3 Encounters:   08/09/21 202 lb (91.6 kg)   06/29/21 201 lb (91.2 kg)   06/01/21 206 lb 14.4 oz (93.8 kg)       Past Medical History:   Diagnosis Date    Chronic fatigue     Heart attack ([address] 05/19/2021    Hypertension     Iron deficiency anemia     CALLY (obstructive sleep apnea)       Past Surgical History:   Procedure Laterality Date    LYMPH NODE BIOPSY         No family history on file.     Social History     Tobacco Use    Smoking screen  06/18/2022    Potassium monitoring  07/30/2022    Creatinine monitoring  07/30/2022    Diabetes screen  07/01/2023    Cervical cancer screen  08/02/2024    Hepatitis A vaccine  Aged Out    Hepatitis B vaccine  Aged Out    Hib vaccine  Aged Out    Meningococcal (ACWY) vaccine  Aged Out    Pneumococcal 0-64 years Vaccine  Aged Out       Subjective:      Review of Systems   Constitutional: Negative for chills, fatigue and fever. Respiratory: Negative for shortness of breath and wheezing. Cardiovascular: Negative for chest pain. Musculoskeletal: Positive for myalgias (right thigh). Objective:     Vitals:    08/09/21 1022   BP: 128/84   Pulse: 54   SpO2: 99%   Weight: 202 lb (91.6 kg)        Estimated body mass index is 29.83 kg/m² as calculated from the following:    Height as of 6/29/21: 5' 9\" (1.753 m). Weight as of this encounter: 202 lb (91.6 kg). Physical Exam  Constitutional:       Appearance: Normal appearance. HENT:      Head: Normocephalic. Cardiovascular:      Rate and Rhythm: Normal rate and regular rhythm. Heart sounds: Normal heart sounds. Pulmonary:      Effort: Pulmonary effort is normal.      Breath sounds: Normal breath sounds. No wheezing. Musculoskeletal:         General: No tenderness. Cervical back: Neck supple. Right upper leg: Normal. No tenderness (No tenderness with moderate palpation to anterior thigh). Left upper leg: Normal.   Neurological:      Mental Status: She is alert and oriented to person, place, and time.       Gait: Gait normal.         PHQ Scores 5/10/2021 5/6/2020 1/16/2019   PHQ2 Score 0 0 0   PHQ9 Score 0 0 0     Interpretation of Total Score Depression Severity: 1-4 = Minimal depression, 5-9 = Mild depression, 10-14 = Moderate depression, 15-19 = Moderately severe depression, 20-27 = Severe depression     Lab Results   Component Value Date     07/30/2021    K 3.3 (L) 07/30/2021     07/30/2021    CO2 26 07/30/2021    BUN 20 (H) 07/30/2021    CREATININE 1.3 (H) 07/30/2021    GLUCOSE 101 07/30/2021    CALCIUM 9.4 07/30/2021    PROT 7.1 04/09/2021    LABALBU 4.3 07/30/2021    BILITOT 0.4 07/30/2021    ALKPHOS 46 07/30/2021    AST 23 07/30/2021    ALT 14 07/30/2021    LABGLOM 56.5 07/30/2021    AGRATIO 1.2 09/16/2019    GLOB 2.7 07/30/2021     Lab Results   Component Value Date    LABA1C 5.2 07/01/2020       Lab Results   Component Value Date    GLUF 97 03/15/2019    LDLCALC 52.4 06/18/2021    CREATININE 1.3 (H) 07/30/2021       Assessment:     1. Strain of right quadriceps muscle, initial encounter        Plan:     Instructed to rest and elevate leg when in seated position. May use Tylenol as needed for discomfort. Discussed use, benefit, and side effects of prescribed medications. All patient questions answered. Patient voiced understanding. Patient agreed with treatment plan. Follow up as directed. Return if symptoms worsen or fail to improve. This note was generated completely or in part utilizing Dragon dictation speech recognition software. Occasionally, words are mistranscribed and despite editing, the text may contain inaccuracies due to incorrect word recognition. If further clarification is needed please contact the office at [phone].     Electronically signed by ZOE Tineo CNP on 8/14/2021

## 2021-08-12 LAB — GYNECOLOGY CYTOLOGY REPORT: NORMAL

## 2021-08-14 ASSESSMENT — ENCOUNTER SYMPTOMS
WHEEZING: 0
SHORTNESS OF BREATH: 0

## 2021-09-07 DIAGNOSIS — E55.9 VITAMIN D DEFICIENCY: Primary | ICD-10-CM

## 2021-09-09 ENCOUNTER — OFFICE VISIT (OUTPATIENT)
Dept: FAMILY MEDICINE CLINIC | Age: 46
End: 2021-09-09
Payer: COMMERCIAL

## 2021-09-09 VITALS
WEIGHT: 204 LBS | DIASTOLIC BLOOD PRESSURE: 82 MMHG | OXYGEN SATURATION: 97 % | BODY MASS INDEX: 30.21 KG/M2 | HEART RATE: 74 BPM | HEIGHT: 69 IN | RESPIRATION RATE: 20 BRPM | SYSTOLIC BLOOD PRESSURE: 100 MMHG

## 2021-09-09 DIAGNOSIS — L85.8 KERATOSIS PILARIS: Primary | ICD-10-CM

## 2021-09-09 DIAGNOSIS — B07.8 FLAT WART: ICD-10-CM

## 2021-09-09 DIAGNOSIS — M25.532 CHRONIC WRIST PAIN, LEFT: ICD-10-CM

## 2021-09-09 DIAGNOSIS — G89.29 CHRONIC WRIST PAIN, LEFT: ICD-10-CM

## 2021-09-09 PROCEDURE — 99214 OFFICE O/P EST MOD 30 MIN: CPT | Performed by: FAMILY MEDICINE

## 2021-09-09 NOTE — PROGRESS NOTES
[facility]  [address]  Dept: [phone]  Dept XPF:[phone]    Melissa Cannon is a 48 y.o. female who presents today for her medical conditions/complaints as notedbelow. Melissa Cannon is c/o of Skin Problem (small fine bumps all over skin couple weeks now), Verruca Vulgaris, and Wrist Pain      HPI:     HPI    Lety Nose is a very complicated past medical history with SLE as well as possible previous Lyme disease within the last year. She did receive treatment with doxycycline 100 mg twice daily for 4 weeks in July to prevent further complications for possible Lyme disease. She also has chronic neurologic symptoms and has been recommended an evaluation for MS by Dr. Jadyn Dougherty. Left wrist pain started really bothering her on the outside of the wrist -- a few weeks, 2-3 maybe. No injury no redness or warmth. Aches off and on and when she types she notices it. Does not notice when she wakes up at night,. Has not done anything specific for this. Also has some warts on the fingers- forever. Index finger and 4th digit. BP Readings from Last 3 Encounters:   09/09/21 100/82   08/09/21 128/84   06/29/21 122/85          (goal 120/80)    Wt Readings from Last 3 Encounters:   09/09/21 204 lb (92.5 kg)   08/09/21 202 lb (91.6 kg)   06/29/21 201 lb (91.2 kg)        Past Medical History:   Diagnosis Date    Chronic fatigue     Heart attack ([address] 05/19/2021    Hypertension     Iron deficiency anemia     CALLY (obstructive sleep apnea)       Past Surgical History:   Procedure Laterality Date    LYMPH NODE BIOPSY         History reviewed. No pertinent family history.     Social History     Tobacco Use    Smoking status: Never Smoker    Smokeless tobacco: Never Used   Substance Use Topics    Alcohol use: Not on file      Current Outpatient Medications   Medication Sig Dispense Refill    ELIQUIS 5 MG TABS tablet       isosorbide mononitrate (IMDUR) 30 MG extended release tablet 60 mg       atorvastatin (LIPITOR) 40 MG tablet Take 40 mg by mouth nightly      Rimegepant Sulfate (NURTEC) 75 MG TBDP Take by mouth      aspirin 81 MG EC tablet Take 81 mg by mouth daily      Multiple Vitamin (MULTIVITAMIN ADULT PO) Take by mouth      vitamin D (ERGOCALCIFEROL) 1.25 MG (34560 UT) CAPS capsule take 1 capsule by mouth every week 4 capsule 1    cloNIDine (CATAPRES) 0.1 MG tablet take 1 tablet by mouth once daily if needed FOR BLOOD PRESSURE SBP OVER 160 30 tablet 3    hydroxychloroquine (PLAQUENIL) 200 MG tablet take 1 tablet by mouth twice a day      labetalol (NORMODYNE) 100 MG tablet take 1 tablet by mouth twice a day      NIFEdipine (PROCARDIA XL) 90 MG extended release tablet take 1 tablet by mouth once daily      olmesartan-hydroCHLOROthiazide (BENICAR HCT) 40-25 MG per tablet take 1 tablet by mouth once daily      ferrous sulfate (IRON 325) 325 (65 Fe) MG tablet 325 mg 2 times daily      potassium chloride (KLOR-CON M) 20 MEQ extended release tablet Take 1 tablet by mouth daily for 3 days 3 tablet 0    gabapentin (NEURONTIN) 300 MG capsule Take 1 capsule by mouth 3 times daily for 180 days. Intended supply: 90 days (Patient taking differently: Take 300 mg by mouth daily. Intended supply: 90 days) 180 capsule 0     No current facility-administered medications for this visit.      Allergies   Allergen Reactions    Dexlansoprazole     Indomethacin     Tetanus Toxoid        Health Maintenance   Topic Date Due    Hepatitis C screen  Never done    COVID-19 Vaccine (1) Never done    Colon cancer screen colonoscopy  Never done    Cervical cancer screen  08/03/2021    Flu vaccine (1) 09/01/2021    HIV screen  01/16/2029 (Originally 8/5/1990)    Lipid screen  06/18/2022    Potassium monitoring  07/30/2022    Creatinine monitoring  07/30/2022    Diabetes screen  07/01/2023    Hepatitis A vaccine  Aged Out    Hepatitis B vaccine  Aged Out  Hib vaccine  Aged Out    Meningococcal (ACWY) vaccine  Aged Out    Pneumococcal 0-64 years Vaccine  Aged Out       Subjective:      Review of Systems    Objective:     /82 (Site: Left Upper Arm, Position: Sitting, Cuff Size: Large Adult)   Pulse 74   Resp 20   Ht 5' 9\" (1.753 m)   Wt 204 lb (92.5 kg)   SpO2 97%   BMI 30.13 kg/m²     Physical Exam  Vitals and nursing note reviewed. Constitutional:       Appearance: Normal appearance. Cardiovascular:      Rate and Rhythm: Normal rate. Pulses: Normal pulses. Pulmonary:      Effort: Pulmonary effort is normal.   Musculoskeletal:        Hands:       Cervical back: Neck supple. Skin:         Neurological:      Mental Status: She is alert. Psychiatric:         Mood and Affect: Mood normal.         Behavior: Behavior normal.         Thought Content: Thought content normal.         Judgment: Judgment normal.         Assessment/Plan:     1. Keratosis pilaris  2. Chronic wrist pain, left  -     XR WRIST LEFT (MIN 3 VIEWS); Future  3. Flat wart    Patient Instructions   Use the Cera Ve SA or the Amlactin lotion regularly for the rough bumpy skin. Try the voltaren gel for the wrist pain up to 3-4 times per day (over the counter)        Lab Results   Component Value Date    WBC 4.4 (L) 07/30/2021    HGB 8.7 (L) 07/30/2021    HCT 25.7 (L) 07/30/2021    .9 07/30/2021    CHOL 111 06/18/2021    TRIG 68 06/18/2021    HDL 45 06/18/2021    ALT 14 07/30/2021    AST 23 07/30/2021     07/30/2021    K 3.3 (L) 07/30/2021     07/30/2021    CREATININE 1.3 (H) 07/30/2021    BUN 20 (H) 07/30/2021    CO2 26 07/30/2021    INR 1.9 (H) 06/07/2021    GLUF 97 03/15/2019    LABA1C 5.2 07/01/2020       Return burn wart off 20 minutes. .        Patient given educational materials - see patientinstructions. Discussed use, benefit, and side effects of prescribed medications. All patient questions answered. Pt voiced understanding.  Reviewed health maintenance. Instructed to continue current medications, diet andexercise. Patient agreed with treatment plan. Follow up as directed.      (Please note that portions of this note were completed with a voice-recognition program. Efforts were made to edit the dictation but occasionally words are mis-transcribed.)    Electronically signed by Jammie Polanco MD on 9/9/2021

## 2021-09-09 NOTE — PATIENT INSTRUCTIONS
Use the Cera Ve SA or the Amlactin lotion regularly for the rough bumpy skin.     Try the voltaren gel for the wrist pain up to 3-4 times per day (over the counter)

## 2021-09-12 DIAGNOSIS — I10 ESSENTIAL HYPERTENSION: ICD-10-CM

## 2021-09-13 LAB — VITAMIN D 25-HYDROXY: 52.9 NG/ML (ref 30–100)

## 2021-09-13 RX ORDER — CLONIDINE HYDROCHLORIDE 0.1 MG/1
TABLET ORAL
Qty: 90 TABLET | Refills: 0 | Status: SHIPPED | OUTPATIENT
Start: 2021-09-13 | End: 2022-05-17

## 2021-09-13 NOTE — TELEPHONE ENCOUNTER
Mao Jean is calling to request a refill on the following medication(s):  Requested Prescriptions     Pending Prescriptions Disp Refills    cloNIDine (CATAPRES) 0.1 MG tablet [Pharmacy Med Name: CLONIDINE HCL 0.1 MG TABLET] 90 tablet      Sig: TAKE 1 TABLET BY MOUTH DAILY IF NEEDED FOR BLOOD PRESSURE SBP OVER 160       Last Visit Date (If Applicable):  7/3/8114    Next Visit Date:    9/17/2021

## 2021-09-17 ENCOUNTER — PROCEDURE VISIT (OUTPATIENT)
Dept: FAMILY MEDICINE CLINIC | Age: 46
End: 2021-09-17
Payer: COMMERCIAL

## 2021-09-17 VITALS
RESPIRATION RATE: 20 BRPM | DIASTOLIC BLOOD PRESSURE: 82 MMHG | BODY MASS INDEX: 30.21 KG/M2 | SYSTOLIC BLOOD PRESSURE: 120 MMHG | WEIGHT: 204 LBS | HEIGHT: 69 IN | HEART RATE: 80 BPM | OXYGEN SATURATION: 98 %

## 2021-09-17 DIAGNOSIS — B07.8 FLAT WART: Primary | ICD-10-CM

## 2021-09-17 PROCEDURE — 17110 DESTRUCTION B9 LES UP TO 14: CPT | Performed by: FAMILY MEDICINE

## 2021-09-17 RX ORDER — ISOSORBIDE MONONITRATE 60 MG/1
TABLET, EXTENDED RELEASE ORAL
COMMUNITY
Start: 2021-08-29

## 2021-09-26 RX ORDER — LIDOCAINE HYDROCHLORIDE 10 MG/ML
1 INJECTION, SOLUTION INFILTRATION; PERINEURAL ONCE
Status: SHIPPED | OUTPATIENT
Start: 2021-09-26

## 2021-10-01 LAB
ALBUMIN/GLOBULIN RATIO: 1.4 G/DL
ALBUMIN: 4.1 G/DL (ref 3.5–5)
ALP BLD-CCNC: 59 UNITS/L (ref 38–126)
ALT SERPL-CCNC: 13 UNITS/L (ref 4–35)
ANION GAP SERPL CALCULATED.3IONS-SCNC: 14.4 MMOL/L
APTT: 28.5 SEC (ref 22–32)
AST SERPL-CCNC: 20 UNITS/L (ref 14–36)
BASOPHILS %: 1.05 (ref 0–3)
BASOPHILS ABSOLUTE: 0.03 (ref 0–0.3)
BILIRUB SERPL-MCNC: 0.4 MG/DL (ref 0.2–1.3)
BUN BLDV-MCNC: 17 MG/DL (ref 7–17)
CALCIUM SERPL-MCNC: 9.4 MG/DL (ref 8.4–10.2)
CHLORIDE BLD-SCNC: 101 MMOL/L (ref 98–120)
CO2: 27 MMOL/L (ref 22–31)
CREAT SERPL-MCNC: 0.9 MG/DL (ref 0.5–1)
EOSINOPHILS %: 3.08 (ref 0–10)
EOSINOPHILS ABSOLUTE: 0.1 (ref 0–1.1)
FERRITIN: 35.4 NG/DL (ref 10–282)
GFR CALCULATED: > 60
GLOBULIN: 3 G/DL
GLUCOSE: 99 MG/DL (ref 65–105)
HCT VFR BLD CALC: 35.7 % (ref 37–47)
HEMOGLOBIN: 12.1 (ref 12–16)
INR BLD: 1.5 RATIO (ref 0.8–1.2)
IRON: 49 MG/DL (ref 37–170)
LYMPHOCYTE %: 22.14 (ref 20–51.1)
LYMPHOCYTES ABSOLUTE: 0.73 (ref 1–5.5)
MCH RBC QN AUTO: 29.8 PG (ref 28.5–32.5)
MCHC RBC AUTO-ENTMCNC: 34 G/DL (ref 32–37)
MCV RBC AUTO: 87.5 FL (ref 80–94)
MONOCYTES %: 10.07 (ref 1.7–9.3)
MONOCYTES ABSOLUTE: 0.33 (ref 0.1–1)
NEUTROPHILS %: 63.66 (ref 42.2–75.2)
NEUTROPHILS ABSOLUTE: 2.09 (ref 2–8.1)
PDW BLD-RTO: 10.7 % (ref 8.5–15.5)
PLATELET # BLD: 150.5 THOU/MM3 (ref 130–400)
POTASSIUM SERPL-SCNC: 3.4 MMOL/L (ref 3.6–5)
PROTHROMBIN TIME: 16.9 SEC (ref 9.3–12.5)
RBC: 4.07 M/UL (ref 4.2–5.4)
SODIUM BLD-SCNC: 139 MMOL/L (ref 135–145)
TOTAL IRON BINDING CAPACITY: 339 MG/DL (ref 261–497)
TOTAL PROTEIN, SERUM: 7.1 G/DL (ref 6.3–8.2)
WBC: 3.3 THOU/ML3 (ref 4.8–10.8)

## 2021-10-07 DIAGNOSIS — A69.20 LYME DISEASE: ICD-10-CM

## 2021-10-12 ENCOUNTER — OFFICE VISIT (OUTPATIENT)
Dept: INFECTIOUS DISEASES | Age: 46
End: 2021-10-12
Payer: COMMERCIAL

## 2021-10-12 VITALS
WEIGHT: 200 LBS | DIASTOLIC BLOOD PRESSURE: 77 MMHG | TEMPERATURE: 97.9 F | SYSTOLIC BLOOD PRESSURE: 128 MMHG | OXYGEN SATURATION: 99 % | HEART RATE: 65 BPM | BODY MASS INDEX: 29.62 KG/M2 | RESPIRATION RATE: 18 BRPM | HEIGHT: 69 IN

## 2021-10-12 DIAGNOSIS — R20.0 RIGHT LEG NUMBNESS: ICD-10-CM

## 2021-10-12 DIAGNOSIS — A69.20 LYME DISEASE: ICD-10-CM

## 2021-10-12 DIAGNOSIS — M32.9 SYSTEMIC LUPUS ERYTHEMATOSUS, UNSPECIFIED SLE TYPE, UNSPECIFIED ORGAN INVOLVEMENT STATUS (HCC): Primary | ICD-10-CM

## 2021-10-12 DIAGNOSIS — G93.32 CHRONIC FATIGUE SYNDROME: ICD-10-CM

## 2021-10-12 DIAGNOSIS — G47.33 OSA (OBSTRUCTIVE SLEEP APNEA): ICD-10-CM

## 2021-10-12 DIAGNOSIS — D61.818 PANCYTOPENIA (HCC): ICD-10-CM

## 2021-10-12 PROCEDURE — 99214 OFFICE O/P EST MOD 30 MIN: CPT | Performed by: INTERNAL MEDICINE

## 2021-10-12 NOTE — PROGRESS NOTES
[facility] - Initial Office Consult Note  Today's Date and Time: 10/12/2021, 12:40 PM    Diagnostic Impression :   · Hx of SLE  · Previous Lyme Exposure  · Chronic fatigue   · Essential HTN  · CALLY  · White matter abnormality on MRI of the brain  · Chronic pancytopenia  · S/P NSTEMI 5/26/2021  · Hysterectomy & bilateral salpingectomy 9/22/21    Recommendations   · Monitor off antibiotics  · Follow-up with Rheumatologist and Neurologist as scheduled  · No need to follow-up unless acute issues arise. Chief complaint/reason for consultation:     Chief Complaint   Patient presents with    Lyme Disease     labs done        History of Present Illness:   Dona Gill is a 48y.o.-year-old  female who was initially evaluated on 10/12/2021. Patient seen at the request of Dr. Jenise Villela. INITIAL HISTORY FROM OFFICE VISIT ON 4/27/21    This is a 50 y.o. female w/ PMHx SLE, ANÍBAL, CALLY, HTN, eczema, and chronic thrombocytopenia who presents today with multiple complaints of ongoing symptoms with a recent diagnosis of Lyme disease exposure. Pt states that for the past year she has been feeling fatigue, numbness of the Rt leg, blurriness and blind spots in her vision, occasional chills, lymphadenopathy. She follows with Dr. Doreen Meyers at [facility] who referred her to Dr. Carmela Garcia (rheumatologist) and Dr. Jenise Villela (neurologist). Patient has undergone an extensive work up including imaging of the brain showing previous infarctions as well as an MRI with hyperintensities in the pericallosal and periventricular regions concerning for MS. Patient was recommended undergo further workup for MS but she declined. She was recently diagnosed with SLE a few months prior and has been taking Hydroxychloroquine 200 BID. She is on several antihypertensives and her BP today is elevated with DBP >100.      Patient was also found to have elevated Lyme IgG titers but non reactive IgM titers. She believes that she may have been exposed in an outdoor area last fall to a tick bite but does not remember any characteristic rashes. Physical examination is significant for a soft mass 2x2cm in her Lt supraclavicular space that is likely a lipoma. Patient states that she has had a Lt axillary lymph node removed in the past with benign findings. She further endorses difficulty with walking but she presents with normal gait on examination. Motor strength is preserved and 5/5 on all four extremities. ROS positive for chills, dizziness, vision changes, fatigue, numbness, problems with memory (short term recall). Denies fever, nausea, chest pain, abdominal pain, dysuria, palpitations. Office Visit 6/29/21  Patient was seen in the office accompanied by her . He indicates that she completed 1 month of doxycycline with a delay of about 1 week without experiencing any difficulties. She indicates that there was some degree of improvement with the medication in terms of her fatigue. However she finds it difficult to evaluate the effect because in the interim she developed a non-ST MI on 5/26/2021. She reports that she have sudden development of chest pain which was prolonged. She has difficulty describing the nature of the pain but indicates it was a the worst thing that she has experienced. Investigations show a blood clot in the left main coronary artery. Once the clot was removed she was able to feel better and has not had any further difficulties. She indicates that no actual coronary atherosclerosis was found. Patient has been placed on Eliquis and has been evaluated by both cardiology and hematology. Her hematologist is monitoring the use of the anticoagulant. Currently the patient indicates she has some residual fatigue, loss of appetite, some constipation and occasional palpitations.     I discussed with her whether she may benefit from my second course of doxycycline suggested that we wait 2 or 3 months until all the cardiac issues have been resolved before undertaking such treatment. The patient and her  are agreeable with the above. We will plan a repeat CBC and Lyme disease testing prior to her next visit in order to make that decision. CURRENT EVALUATION : 10/12/2021     Patient evaluated in the office on 10/12/2021  Indicates that overall she is doing fairly well. She is controlling her symptoms of SLE with hydroxychloroquine. She is still experiencing some of the same symptoms which may be attributed to her Lupus diagnosis but says she is doing pretty well all things considered. Patient underwent a total hysterectomy on 9/22/21 because of abnormal uterine bleeding. She says that she has not experience any problems post-operatively. The repeat Lyme Ab completed on 10/1/21 was negative. She completed 4 weeks of PO Doxycycline last July  At this point she is a stable ID wise     She does not need any more follow-up ID-wise at this time but will be glad to see her again should additional problems develop. DISCUSSION:  · Patient has several medical issues that are ongoing  · Review her laboratory work shows that she has had different degrees of pancytopenia for several years. She is not sure that a cause has been determined  · To his being prescribed treatment for iron deficiency anemia but that diagnosis would not explain the overall picture of pancytopenia. · She has also recently been diagnosed as having systemic lupus erythematosus and has a started treatment with hydroxychloroquine. Tolerating such treatment well. Unfortunately I do not have access to the laboratory work that accompanied the diagnosis. · She has also a history of chronic fatigue. · In addition she has a diagnosis of hypertension. More recently control of the hypertension seems to have become more difficult.   · She indicates that at one point a diagnosis of a CVA was made.  However on repeat scanning of her brain apparently the area of abnormality was no longer found. · She has an MRI of the brain which shows white matter abnormalities. There has been concern for the possibility of multiple sclerosis. Additional testing was requested by Dr. Maribel Barnard but apparently the patient declined to proceed. · As part of her testing for possible MS she had a Lyme's Western blot performed. The review of the results shows 5 areas of precipitation in the IgG test done in the IgM test.  By these criteria the patient would be considered to have had prior exposure to Lyme's disease, but not to have an acute infection. · She reports that she has not had any antibiotic treatment for this condition. I suggested to her and her  that it would be prudent to proceed with a course of at least 1 month of oral doxycycline to try to control any residual activity from the Lyme's infection prevent any potential further injury to the central nervous system. · I indicated to the patient that I do not know if any of the other medical problems she is experiencing have any relationship to the Lyme's disease. · I have suggested that she continue her follow-up with the different specialists that are looking after her neurological and rheumatological problems as well as her PCP. · I will plan to see her in 2 months after she has had 1 month of therapy with doxycycline. Emphasized that the treatment with doxycycline may not produce any improvement on her current symptoms. The treatment prescribed may however prevent any further progression of any residual Lyme's disease  · Patient examined on 10/12/2021. She is a stable ID wise      Patient seen face to face for a period of 30 min, of which more than 50% of the time was spent on counseling, explanation of diagnosis, planning of further management, and answering all questions  .      I have personally reviewed the past medical history, past surgical history, medications, social history, and family history, and I have updated the database accordingly. Past Medical History:     Past Medical History:   Diagnosis Date    Chronic fatigue     Heart attack ([facility] [place] [address] 05/19/2021    Hypertension     Iron deficiency anemia     CALLY (obstructive sleep apnea)        Past Surgical  History:     Past Surgical History:   Procedure Laterality Date    LYMPH NODE BIOPSY         Medications:     Current Outpatient Medications:     isosorbide mononitrate (IMDUR) 60 MG extended release tablet, TAKE 1 TABLET BY MOUTH EVERY DAY IN THE MORNING, Disp: , Rfl:     cloNIDine (CATAPRES) 0.1 MG tablet, TAKE 1 TABLET BY MOUTH DAILY IF NEEDED FOR BLOOD PRESSURE SBP OVER 160, Disp: 90 tablet, Rfl: 0    ELIQUIS 5 MG TABS tablet, , Disp: , Rfl:     atorvastatin (LIPITOR) 40 MG tablet, Take 40 mg by mouth nightly, Disp: , Rfl:     Rimegepant Sulfate (NURTEC) 75 MG TBDP, Take by mouth, Disp: , Rfl:     aspirin 81 MG EC tablet, Take 81 mg by mouth daily, Disp: , Rfl:     Multiple Vitamin (MULTIVITAMIN ADULT PO), Take by mouth, Disp: , Rfl:     vitamin D (ERGOCALCIFEROL) 1.25 MG (89140 UT) CAPS capsule, take 1 capsule by mouth every week, Disp: 4 capsule, Rfl: 1    hydroxychloroquine (PLAQUENIL) 200 MG tablet, take 1 tablet by mouth twice a day, Disp: , Rfl:     labetalol (NORMODYNE) 100 MG tablet, take 1 tablet by mouth twice a day, Disp: , Rfl:     NIFEdipine (PROCARDIA XL) 90 MG extended release tablet, take 1 tablet by mouth once daily, Disp: , Rfl:     olmesartan-hydroCHLOROthiazide (BENICAR HCT) 40-25 MG per tablet, take 1 tablet by mouth once daily, Disp: , Rfl:     potassium chloride (KLOR-CON M) 20 MEQ extended release tablet, Take 1 tablet by mouth daily for 3 days, Disp: 3 tablet, Rfl: 0    ferrous sulfate (IRON 325) 325 (65 Fe) MG tablet, 325 mg 2 times daily, Disp: , Rfl:     gabapentin (NEURONTIN) 300 MG capsule, Take 1 capsule by mouth 3 times daily for 180 days. 07/30/2021    LYMPHOPCT 22.14 10/01/2021    LYMPHOPCT 19.37 07/30/2021    MONOPCT 10.7 02/23/2020    MONOPCT 16.4 11/19/2019    EOSPCT 3.5 02/23/2020    EOSPCT 3.1 11/19/2019     BMP:   Lab Results   Component Value Date     10/01/2021     07/30/2021    K 3.4 10/01/2021    K 3.3 07/30/2021     10/01/2021     07/30/2021    CO2 27 10/01/2021    CO2 26 07/30/2021    BUN 17 10/01/2021    BUN 20 07/30/2021    CREATININE 0.9 10/01/2021    CREATININE 1.3 07/30/2021    MG 1.9 01/26/2021    MG 2.0 02/23/2020     Hepatic Function Panel:  Lab Results   Component Value Date    PROT 7.1 04/09/2021    PROT 7.4 03/11/2021    LABALBU 4.1 10/01/2021    LABALBU 4.3 07/30/2021    BILIDIR 0.0 06/07/2021    BILITOT 0.4 10/01/2021    BILITOT 0.4 07/30/2021    ALKPHOS 59 10/01/2021    ALKPHOS 46 07/30/2021    ALT 13 10/01/2021    ALT 14 07/30/2021    AST 20 10/01/2021    AST 23 07/30/2021     No results found for: RPR  No results found for: HIV  No results found for: [facility]  Lab Results   Component Value Date    MUCUS Absent 05/19/2021    RBC 4.07 10/01/2021    WBC 3.3 10/01/2021    WBC 3.79 09/16/2019    YEAST Absent 05/19/2021     Lab Results   Component Value Date    CREATININE 0.9 10/01/2021    GLUCOSE 99 10/01/2021         Thank you for allowing us to participate in the care of this patient. Please call with questions. Ellen Davidson, APRN - CNP     ATTESTATION:    I have discussed the case, including pertinent history and exam findings with the APRN. I have evaluated the  History, physical findings and pictures of the patient and the key elements of the encounter have been performed by me. I have reviewed the laboratory data, other diagnostic studies and discussed them with the APRN. I have updated the medical record where necessary. I agree with the assessment, plan and orders as documented by the APRN.     Yoselyn Yang MD.      Pager: [phone] - Office: [phone]

## 2021-12-06 ENCOUNTER — NURSE TRIAGE (OUTPATIENT)
Dept: OTHER | Facility: CLINIC | Age: 46
End: 2021-12-06

## 2021-12-06 ENCOUNTER — OFFICE VISIT (OUTPATIENT)
Dept: FAMILY MEDICINE CLINIC | Age: 46
End: 2021-12-06
Payer: COMMERCIAL

## 2021-12-06 VITALS
HEART RATE: 62 BPM | WEIGHT: 201.3 LBS | SYSTOLIC BLOOD PRESSURE: 140 MMHG | BODY MASS INDEX: 29.73 KG/M2 | DIASTOLIC BLOOD PRESSURE: 90 MMHG | OXYGEN SATURATION: 98 %

## 2021-12-06 DIAGNOSIS — L30.9 ECZEMA OF BOTH UPPER EXTREMITIES: ICD-10-CM

## 2021-12-06 DIAGNOSIS — R20.0 NUMBNESS OF RIGHT LOWER EXTREMITY: Primary | ICD-10-CM

## 2021-12-06 DIAGNOSIS — E55.9 VITAMIN D DEFICIENCY: ICD-10-CM

## 2021-12-06 PROCEDURE — 99213 OFFICE O/P EST LOW 20 MIN: CPT | Performed by: NURSE PRACTITIONER

## 2021-12-06 RX ORDER — TRIAMCINOLONE ACETONIDE 1 MG/G
CREAM TOPICAL
Qty: 80 G | Refills: 1 | Status: SHIPPED | OUTPATIENT
Start: 2021-12-06

## 2021-12-06 NOTE — PROGRESS NOTES
[address] [address] [address]  Dept: [phone]  Dept Fax: [phone]    History and Physical  Patient:  Ricardo Baltazar  YOB: 1975  Date of Service:  2021    Subjective:   Ricardo Baltazar (:  1975) is a 48 y.o. female, Established patient, here for evaluation of the following chief complaint(s):    Chief Complaint   Patient presents with    Leg Pain     c/o having some leg numbness in right leg for some time has been on gabapentin but is not helping    Constipation     has tried stool softner, and has even tried dairy products to help      Having ongoing numbness issues to the right leg. She has difficulty with taking the gabapentin, makes her drowsy. When she takes it at night it seems to help a little. It also makes her wake up drowsy. The numbness is present to the front of the leg and toes. She denies having pain, weakness, or numbness to the lower back or hips. She also describes the sensation as feeling cold from the knee down. States it occurs randomly and will last for different lengths of time. She reports feeling much better yesterday while wearing heels. Today she is feeling it more and is wearing tennis shoes. Leg Pain   The incident occurred more than 1 week ago. There was no injury mechanism. The pain is present in the right leg. The patient is experiencing no pain. Associated symptoms include numbness (right leg). Pertinent negatives include no inability to bear weight or tingling. She has tried rest (gabapentin) for the symptoms. The treatment provided mild relief. The ASCVD Risk score (Janice Mcguire., et al., 2013) failed to calculate for the following reasons:     The patient has a prior MI or stroke diagnosis     BP Readings from Last 3 Encounters:   21 (!) 140/90   10/12/21 128/77   21 120/82      Pulse Readings from Last 3 Encounters:   21 62   10/12/21 65   21 80 Wt Readings from Last 3 Encounters:   12/06/21 201 lb 4.8 oz (91.3 kg)   10/12/21 200 lb (90.7 kg)   09/17/21 204 lb (92.5 kg)        Allergies   Allergen Reactions    Dexlansoprazole     Indomethacin     Tetanus Toxoid        Current Outpatient Medications   Medication Sig Dispense Refill    vitamin D3 (CHOLECALCIFEROL) 25 MCG (1000 UT) TABS tablet Take 2 tablets by mouth daily 30 tablet 0    triamcinolone (KENALOG) 0.1 % cream Apply topically 2 times daily. 80 g 1    isosorbide mononitrate (IMDUR) 60 MG extended release tablet TAKE 1 TABLET BY MOUTH EVERY DAY IN THE MORNING      cloNIDine (CATAPRES) 0.1 MG tablet TAKE 1 TABLET BY MOUTH DAILY IF NEEDED FOR BLOOD PRESSURE SBP OVER 160 90 tablet 0    ELIQUIS 5 MG TABS tablet       atorvastatin (LIPITOR) 40 MG tablet Take 40 mg by mouth nightly      Rimegepant Sulfate (NURTEC) 75 MG TBDP Take by mouth      aspirin 81 MG EC tablet Take 81 mg by mouth daily      Multiple Vitamin (MULTIVITAMIN ADULT PO) Take by mouth      hydroxychloroquine (PLAQUENIL) 200 MG tablet take 1 tablet by mouth twice a day      labetalol (NORMODYNE) 100 MG tablet take 1 tablet by mouth twice a day      NIFEdipine (PROCARDIA XL) 90 MG extended release tablet take 1 tablet by mouth once daily      olmesartan-hydroCHLOROthiazide (BENICAR HCT) 40-25 MG per tablet take 1 tablet by mouth once daily      ferrous sulfate (IRON 325) 325 (65 Fe) MG tablet 325 mg 2 times daily      gabapentin (NEURONTIN) 300 MG capsule Take 1 capsule by mouth 3 times daily for 180 days. Intended supply: 90 days (Patient taking differently: Take 300 mg by mouth daily.  Intended supply: 90 days) 180 capsule 0    potassium chloride (KLOR-CON M) 20 MEQ extended release tablet Take 1 tablet by mouth daily for 3 days 3 tablet 0     Current Facility-Administered Medications   Medication Dose Route Frequency Provider Last Rate Last Admin    lidocaine 1 % injection 1 mL  1 mL Intra-artICUlar Bryan Santiago Shannan Stanley MD            Past Medical History:   Diagnosis Date    Adenomyosis     Chronic fatigue     Heart attack ([facility] [address] 05/19/2021    Hypertension     Iron deficiency anemia     CALLY (obstructive sleep apnea)        Past Surgical History:   Procedure Laterality Date    HYSTERECTOMY, VAGINAL      LYMPH NODE BIOPSY       No family history on file. Review of Systems:     Review of Systems   Constitutional: Negative for appetite change, chills, fatigue and fever. HENT: Negative. Respiratory: Negative for cough, shortness of breath and wheezing. Cardiovascular: Negative for chest pain and leg swelling. Musculoskeletal: Negative for arthralgias, back pain and gait problem. Neurological: Positive for numbness (right leg). Negative for dizziness, tingling, weakness and headaches. PHQ Scores 5/10/2021 5/6/2020 1/16/2019   PHQ2 Score 0 0 0   PHQ9 Score 0 0 0     Interpretation of Total Score Depression Severity: 1-4 = Minimal depression, 5-9 = Mild depression, 10-14 = Moderate depression, 15-19 = Moderately severe depression, 20-27 = Severe depression     Physical Exam:     Vitals:    12/06/21 1610   BP: (!) 140/90   Pulse: 62   SpO2: 98%   Weight: 201 lb 4.8 oz (91.3 kg)      Body mass index is 29.73 kg/m². Physical Exam  Constitutional:       Appearance: Normal appearance. HENT:      Head: Normocephalic. Eyes:      Conjunctiva/sclera: Conjunctivae normal.   Cardiovascular:      Rate and Rhythm: Normal rate and regular rhythm. Pulses: Normal pulses. Heart sounds: Normal heart sounds. Pulmonary:      Effort: Pulmonary effort is normal.      Breath sounds: Normal breath sounds. No wheezing. Musculoskeletal:      Cervical back: Neck supple. Lumbar back: No spasms or tenderness. Normal range of motion. Negative right straight leg raise test and negative left straight leg raise test.      Right hip: Normal. No tenderness or bony tenderness. Normal range of motion.  Normal

## 2021-12-08 RX ORDER — MELATONIN
2000 DAILY
Qty: 30 TABLET | Refills: 0 | COMMUNITY
Start: 2021-12-08

## 2021-12-12 ASSESSMENT — ENCOUNTER SYMPTOMS
SHORTNESS OF BREATH: 0
WHEEZING: 0
COUGH: 0
BACK PAIN: 0

## 2022-01-10 DIAGNOSIS — R20.0 NUMBNESS OF RIGHT LOWER EXTREMITY: ICD-10-CM

## 2022-03-01 ENCOUNTER — OFFICE VISIT (OUTPATIENT)
Dept: FAMILY MEDICINE CLINIC | Age: 47
End: 2022-03-01
Payer: COMMERCIAL

## 2022-03-01 ENCOUNTER — HOSPITAL ENCOUNTER (OUTPATIENT)
Age: 47
Setting detail: SPECIMEN
Discharge: HOME | End: 2022-03-01
Payer: COMMERCIAL

## 2022-03-01 VITALS
RESPIRATION RATE: 16 BRPM | HEART RATE: 62 BPM | SYSTOLIC BLOOD PRESSURE: 118 MMHG | HEIGHT: 68 IN | OXYGEN SATURATION: 99 % | TEMPERATURE: 97.9 F | BODY MASS INDEX: 30.86 KG/M2 | DIASTOLIC BLOOD PRESSURE: 70 MMHG | WEIGHT: 203.6 LBS

## 2022-03-01 DIAGNOSIS — R82.90 URINE FINDINGS ABNORMAL: ICD-10-CM

## 2022-03-01 DIAGNOSIS — R30.0 DYSURIA: Primary | ICD-10-CM

## 2022-03-01 LAB
ALBUMIN/GLOBULIN RATIO: 1.3 G/DL
ALBUMIN: 4.1 G/DL (ref 3.5–5)
ALP BLD-CCNC: 60 UNITS/L (ref 38–126)
ALT SERPL-CCNC: 25 UNITS/L (ref 4–35)
ANION GAP SERPL CALCULATED.3IONS-SCNC: 12.1 MMOL/L
AST SERPL-CCNC: 42 UNITS/L (ref 14–36)
BANDED NEUTROPHILS RELATIVE PERCENT: 0 % (ref 0–5)
BASOPHILS %. MANUAL COUNT: 0 % (ref 0–1)
BILIRUB SERPL-MCNC: 0.5 MG/DL (ref 0.2–1.3)
BILIRUBIN, POC: ABNORMAL
BLOOD URINE, POC: ABNORMAL
BUN BLDV-MCNC: 18 MG/DL (ref 7–17)
CALCIUM SERPL-MCNC: 9.1 MG/DL (ref 8.4–10.2)
CHLORIDE BLD-SCNC: 100 MMOL/L (ref 98–120)
CLARITY, POC: CLEAR
CO2: 31 MMOL/L (ref 22–31)
COLOR, POC: YELLOW
CREAT SERPL-MCNC: 1 MG/DL (ref 0.5–1)
EOSINOPHILS % MANUAL COUNT: 3 (ref 0–10)
GFR CALCULATED: > 60
GLOBULIN: 3.1 G/DL
GLUCOSE URINE, POC: ABNORMAL
GLUCOSE: 101 MG/DL (ref 65–105)
HCT VFR BLD CALC: 37.9 % (ref 37–47)
HEMOGLOBIN: 12.4 (ref 12–16)
KETONES, POC: ABNORMAL
LEUKOCYTE EST, POC: ABNORMAL
LYMPHOCYTES % MANUAL COUNT: 35 % (ref 21–51)
MACROCYTOSIS: NORMAL
MCH RBC QN AUTO: 32 PG (ref 28.5–32.5)
MCHC RBC AUTO-ENTMCNC: 32.8 G/DL (ref 32–37)
MCV RBC AUTO: 97.7 FL (ref 80–94)
MONOCYTES RELATIVE PERCENT: 7 % (ref 2–9)
NEUTROPHILS %. MANUAL COUNT: 55 % (ref 42–75)
NITRITE, POC: ABNORMAL
PDW BLD-RTO: 12 % (ref 8.5–15.5)
PH, POC: 6.5
PLATELET # BLD: 122.5 THOU/MM3 (ref 130–400)
POTASSIUM SERPL-SCNC: 3.1 MMOL/L (ref 3.6–5)
PROTEIN, POC: ABNORMAL
RBC: 3.88 M/UL (ref 4.2–5.4)
SODIUM BLD-SCNC: 140 MMOL/L (ref 135–145)
SPECIFIC GRAVITY, POC: 1.03
TOTAL PROTEIN, SERUM: 7.3 G/DL (ref 6.3–8.2)
UROBILINOGEN, POC: 1
WBC: 2.1 THOU/ML3 (ref 4.8–10.8)

## 2022-03-01 PROCEDURE — 51798 US URINE CAPACITY MEASURE: CPT | Performed by: NURSE PRACTITIONER

## 2022-03-01 PROCEDURE — 99213 OFFICE O/P EST LOW 20 MIN: CPT | Performed by: NURSE PRACTITIONER

## 2022-03-01 PROCEDURE — 87086 URINE CULTURE/COLONY COUNT: CPT

## 2022-03-01 PROCEDURE — 87077 CULTURE AEROBIC IDENTIFY: CPT

## 2022-03-01 PROCEDURE — 81002 URINALYSIS NONAUTO W/O SCOPE: CPT | Performed by: NURSE PRACTITIONER

## 2022-03-01 PROCEDURE — 87186 SC STD MICRODIL/AGAR DIL: CPT

## 2022-03-01 RX ORDER — CIPROFLOXACIN 250 MG/1
250 TABLET, FILM COATED ORAL 2 TIMES DAILY
Qty: 6 TABLET | Refills: 0 | Status: SHIPPED | OUTPATIENT
Start: 2022-03-01 | End: 2022-03-04

## 2022-03-01 SDOH — ECONOMIC STABILITY: FOOD INSECURITY: WITHIN THE PAST 12 MONTHS, YOU WORRIED THAT YOUR FOOD WOULD RUN OUT BEFORE YOU GOT MONEY TO BUY MORE.: NEVER TRUE

## 2022-03-01 SDOH — ECONOMIC STABILITY: FOOD INSECURITY: WITHIN THE PAST 12 MONTHS, THE FOOD YOU BOUGHT JUST DIDN'T LAST AND YOU DIDN'T HAVE MONEY TO GET MORE.: NEVER TRUE

## 2022-03-01 ASSESSMENT — PATIENT HEALTH QUESTIONNAIRE - PHQ9
1. LITTLE INTEREST OR PLEASURE IN DOING THINGS: 0
SUM OF ALL RESPONSES TO PHQ QUESTIONS 1-9: 0
SUM OF ALL RESPONSES TO PHQ9 QUESTIONS 1 & 2: 0
SUM OF ALL RESPONSES TO PHQ QUESTIONS 1-9: 0
2. FEELING DOWN, DEPRESSED OR HOPELESS: 0

## 2022-03-01 ASSESSMENT — ENCOUNTER SYMPTOMS
SHORTNESS OF BREATH: 0
COUGH: 0
ABDOMINAL PAIN: 0
WHEEZING: 0

## 2022-03-01 ASSESSMENT — SOCIAL DETERMINANTS OF HEALTH (SDOH): HOW HARD IS IT FOR YOU TO PAY FOR THE VERY BASICS LIKE FOOD, HOUSING, MEDICAL CARE, AND HEATING?: NOT HARD AT ALL

## 2022-03-01 NOTE — PROGRESS NOTES
MD   potassium chloride (KLOR-CON M) 20 MEQ extended release tablet Take 1 tablet by mouth daily for 3 days Yes ZOE Rodriguez CNP   ferrous sulfate (IRON 325) 325 (65 Fe) MG tablet 325 mg 2 times daily Yes Historical Provider, MD   gabapentin (NEURONTIN) 300 MG capsule Take 1 capsule by mouth 3 times daily for 180 days. Intended supply: 90 days  Patient taking differently: Take 300 mg by mouth daily. Intended supply: 90 days Yes Kurt Ryan MD   vitamin D3 (CHOLECALCIFEROL) 25 MCG (1000 UT) TABS tablet Take 2 tablets by mouth daily  Patient not taking: Reported on 3/1/2022  ZOE Rodriguez CNP   cloNIDine (CATAPRES) 0.1 MG tablet TAKE 1 TABLET BY MOUTH DAILY IF NEEDED FOR BLOOD PRESSURE SBP OVER 160  Patient not taking: Reported on 3/1/2022  ZOE Rodriguez CNP   Rimegepant Sulfate (NURTEC) 75 MG TBDP Take by mouth  Historical Provider, MD        The patient is allergic to dexlansoprazole, indomethacin, and tetanus toxoid. Past Medical History  Delvis Dick  has a past medical history of Adenomyosis, Chronic fatigue, Heart attack ([facility] [address] Hypertension, Iron deficiency anemia, and CALLY (obstructive sleep apnea). Past Surgical History  The patient  has a past surgical history that includes lymph node biopsy and Hysterectomy, vaginal.    Family History  This patient's family history is not on file. Social History  Delvis Dick  reports that she has never smoked.  She has never used smokeless tobacco.    Health Maintenance:    Health Maintenance   Topic Date Due    Hepatitis C screen  Never done    COVID-19 Vaccine (1) Never done    Colorectal Cancer Screen  Never done    Flu vaccine (1) 09/01/2021    HIV screen  01/16/2029 (Originally 8/5/1988)    Depression Screen  05/10/2022    Lipid screen  06/18/2022    Potassium monitoring  10/01/2022    Creatinine monitoring  10/01/2022    Hepatitis A vaccine  Aged Out    Hepatitis B vaccine  Aged Out    Hib vaccine  Aged Out    Meningococcal (ACWY) vaccine  Aged Out    Pneumococcal 0-64 years Vaccine  Aged Out       Subjective:      Review of Systems   Constitutional: Negative for chills, fatigue and fever. HENT: Negative for congestion. Respiratory: Negative for cough, shortness of breath and wheezing. Cardiovascular: Negative for chest pain, palpitations and leg swelling. Gastrointestinal: Negative for abdominal pain. Genitourinary: Positive for dysuria. Negative for difficulty urinating, dyspareunia, flank pain, frequency, hematuria, urgency, vaginal bleeding, vaginal discharge and vaginal pain. Neurological: Negative for dizziness. Psychiatric/Behavioral: Negative for agitation. Objective:     /70 (Site: Right Upper Arm, Position: Sitting, Cuff Size: Large Adult)   Pulse 62   Temp 97.9 °F (36.6 °C)   Resp 16   Ht 5' 8.1\" (1.73 m)   Wt 203 lb 9.6 oz (92.4 kg)   SpO2 99%   BMI 30.87 kg/m²     Physical Exam  Vitals and nursing note reviewed. Constitutional:       Appearance: Normal appearance. Cardiovascular:      Rate and Rhythm: Normal rate and regular rhythm. Pulses: Normal pulses. Heart sounds: S1 normal and S2 normal. No murmur heard. No gallop. Pulmonary:      Effort: Pulmonary effort is normal.      Breath sounds: Normal breath sounds. No wheezing. Abdominal:      General: Bowel sounds are normal.      Palpations: Abdomen is soft. Musculoskeletal:      Right lower leg: No edema. Left lower leg: No edema. Skin:     General: Skin is warm. Capillary Refill: Capillary refill takes less than 2 seconds. Neurological:      General: No focal deficit present. Mental Status: She is alert. Psychiatric:         Attention and Perception: Attention normal.         Mood and Affect: Mood normal.         Behavior: Behavior normal. Behavior is cooperative. Thought Content:  Thought content normal.         Judgment: Judgment normal.         Labs Reviewed 3/1/2022:    Lab Results Component Value Date    WBC 3.3 (L) 10/01/2021    HGB 12.1 10/01/2021    HCT 35.7 (L) 10/01/2021    .5 10/01/2021    CHOL 111 06/18/2021    TRIG 68 06/18/2021    HDL 45 06/18/2021    ALT 13 10/01/2021    AST 20 10/01/2021     10/01/2021    K 3.4 (L) 10/01/2021     10/01/2021    CREATININE 0.9 10/01/2021    BUN 17 10/01/2021    CO2 27 10/01/2021    INR 1.5 (H) 10/01/2021    GLUF 97 03/15/2019    LABA1C 5.2 07/01/2020       Assessment/Plan      1. Dysuria  CBC  And BMP and if no better or worse to ER immediately  Await culture and consider 3 days of antibiotics to treat symptoms,   cipro 250mg BID 3 days   Close f/u with PCP with consideration of US bladder please   1-2 days   - POCT Urinalysis no Micro      No follow-ups on file. Patient given educational materials - see patient instructions. Discussed use, benefit, and side effects of prescribed medications. All patient questions answered. Pt voiced understanding. Reviewed health maintenance.        Electronically signed ZOE Pruett CNP on 3/1/2022 at 3:51 PM

## 2022-03-03 ENCOUNTER — TELEPHONE (OUTPATIENT)
Dept: FAMILY MEDICINE CLINIC | Age: 47
End: 2022-03-03

## 2022-03-03 DIAGNOSIS — E87.6 HYPOKALEMIA: Primary | ICD-10-CM

## 2022-03-03 DIAGNOSIS — D70.8 OTHER NEUTROPENIA (HCC): Primary | ICD-10-CM

## 2022-03-03 NOTE — TELEPHONE ENCOUNTER
Also please advise patient to take 1 extra potassium each day until Monday next week and repeat labs with PCP

## 2022-03-04 LAB
CULTURE: ABNORMAL
SPECIMEN DESCRIPTION: ABNORMAL

## 2022-03-10 LAB
ALBUMIN/GLOBULIN RATIO: 1.3 G/DL
ALBUMIN: 4.1 G/DL (ref 3.5–5)
ALP BLD-CCNC: 64 UNITS/L (ref 38–126)
ALT SERPL-CCNC: 18 UNITS/L (ref 4–35)
ANION GAP SERPL CALCULATED.3IONS-SCNC: 12.5 MMOL/L
AST SERPL-CCNC: 28 UNITS/L (ref 14–36)
BILIRUB SERPL-MCNC: 0.5 MG/DL (ref 0.2–1.3)
BUN BLDV-MCNC: 15 MG/DL (ref 7–17)
CALCIUM SERPL-MCNC: 9.4 MG/DL (ref 8.4–10.2)
CHLORIDE BLD-SCNC: 103 MMOL/L (ref 98–120)
CO2: 26 MMOL/L (ref 22–31)
CREAT SERPL-MCNC: 0.8 MG/DL (ref 0.5–1)
GFR CALCULATED: > 60
GLOBULIN: 3.1 G/DL
GLUCOSE: 118 MG/DL (ref 65–105)
POTASSIUM SERPL-SCNC: 3.5 MMOL/L (ref 3.6–5)
SODIUM BLD-SCNC: 138 MMOL/L (ref 135–145)
TOTAL PROTEIN, SERUM: 7.2 G/DL (ref 6.3–8.2)

## 2022-05-12 LAB
ALBUMIN/GLOBULIN RATIO: 1.5 G/DL
ALBUMIN: 4.5 G/DL (ref 3.5–5)
ALP BLD-CCNC: 61 UNITS/L (ref 38–126)
ALT SERPL-CCNC: 17 UNITS/L (ref 4–35)
ANION GAP SERPL CALCULATED.3IONS-SCNC: 8.5 MMOL/L
AST SERPL-CCNC: 24 UNITS/L (ref 14–36)
BASOPHILS %: 1.82 (ref 0–3)
BASOPHILS ABSOLUTE: 0.07 (ref 0–0.3)
BILIRUB SERPL-MCNC: 0.6 MG/DL (ref 0.2–1.3)
BUN BLDV-MCNC: 14 MG/DL (ref 7–17)
CALCIUM SERPL-MCNC: 9.4 MG/DL (ref 8.4–10.2)
CHLORIDE BLD-SCNC: 103 MMOL/L (ref 98–120)
CO2: 28 MMOL/L (ref 22–31)
CREAT SERPL-MCNC: 0.9 MG/DL (ref 0.5–1)
EOSINOPHILS %: 4.45 (ref 0–10)
EOSINOPHILS ABSOLUTE: 0.17 (ref 0–1.1)
FERRITIN: 105 NG/DL (ref 10–282)
GFR CALCULATED: > 60
GLOBULIN: 3 G/DL
GLUCOSE: 98 MG/DL (ref 65–105)
HCT VFR BLD CALC: 41.2 % (ref 37–47)
HEMOGLOBIN: 13.6 (ref 12–16)
IRON: 70 MG/DL (ref 37–170)
LYMPHOCYTE %: 25.04 (ref 20–51.1)
LYMPHOCYTES ABSOLUTE: 0.94 (ref 1–5.5)
MCH RBC QN AUTO: 31.6 PG (ref 28.5–32.5)
MCHC RBC AUTO-ENTMCNC: 33 G/DL (ref 32–37)
MCV RBC AUTO: 95.6 FL (ref 80–94)
MONOCYTES %: 7.71 (ref 1.7–9.3)
MONOCYTES ABSOLUTE: 0.29 (ref 0.1–1)
NEUTROPHILS %: 60.97 (ref 42.2–75.2)
NEUTROPHILS ABSOLUTE: 2.3 (ref 2–8.1)
PDW BLD-RTO: 11.7 % (ref 8.5–15.5)
PLATELET # BLD: 144.9 THOU/MM3 (ref 130–400)
POTASSIUM SERPL-SCNC: 3.8 MMOL/L (ref 3.6–5)
RBC: 4.3 M/UL (ref 4.2–5.4)
SODIUM BLD-SCNC: 139 MMOL/L (ref 135–145)
TOTAL IRON BINDING CAPACITY: 325 MG/DL (ref 261–497)
TOTAL PROTEIN, SERUM: 7.4 G/DL (ref 6.3–8.2)
WBC: 3.8 THOU/ML3 (ref 4.8–10.8)

## 2022-05-17 ENCOUNTER — OFFICE VISIT (OUTPATIENT)
Dept: FAMILY MEDICINE CLINIC | Age: 47
End: 2022-05-17
Payer: COMMERCIAL

## 2022-05-17 VITALS — SYSTOLIC BLOOD PRESSURE: 116 MMHG | OXYGEN SATURATION: 96 % | DIASTOLIC BLOOD PRESSURE: 80 MMHG | HEART RATE: 87 BPM

## 2022-05-17 DIAGNOSIS — E87.6 HYPOKALEMIA: ICD-10-CM

## 2022-05-17 DIAGNOSIS — D50.9 IRON DEFICIENCY ANEMIA, UNSPECIFIED IRON DEFICIENCY ANEMIA TYPE: ICD-10-CM

## 2022-05-17 DIAGNOSIS — M32.9 SYSTEMIC LUPUS ERYTHEMATOSUS-RELATED SYNDROME (HCC): ICD-10-CM

## 2022-05-17 DIAGNOSIS — I24.0 OCCLUSION OF LAD (LEFT ANTERIOR DESCENDING) ARTERY (HCC): ICD-10-CM

## 2022-05-17 DIAGNOSIS — G93.32 CHRONIC FATIGUE SYNDROME: ICD-10-CM

## 2022-05-17 DIAGNOSIS — E66.09 CLASS 1 OBESITY DUE TO EXCESS CALORIES WITH SERIOUS COMORBIDITY AND BODY MASS INDEX (BMI) OF 30.0 TO 30.9 IN ADULT: ICD-10-CM

## 2022-05-17 DIAGNOSIS — G47.33 OSA (OBSTRUCTIVE SLEEP APNEA): ICD-10-CM

## 2022-05-17 DIAGNOSIS — L80 VITILIGO: ICD-10-CM

## 2022-05-17 DIAGNOSIS — I10 PRIMARY HYPERTENSION: ICD-10-CM

## 2022-05-17 DIAGNOSIS — H93.13 TINNITUS OF BOTH EARS: Primary | ICD-10-CM

## 2022-05-17 DIAGNOSIS — E55.9 VITAMIN D DEFICIENCY: ICD-10-CM

## 2022-05-17 PROBLEM — I25.2 HISTORY OF MYOCARDIAL INFARCTION: Status: ACTIVE | Noted: 2022-05-17

## 2022-05-17 PROCEDURE — 99213 OFFICE O/P EST LOW 20 MIN: CPT | Performed by: NURSE PRACTITIONER

## 2022-05-17 RX ORDER — POTASSIUM CHLORIDE 750 MG/1
10 TABLET, EXTENDED RELEASE ORAL DAILY
Qty: 30 TABLET | Refills: 0 | Status: SHIPPED | OUTPATIENT
Start: 2022-05-17 | End: 2022-06-13

## 2022-05-17 NOTE — PROGRESS NOTES
[address] [address] [address]  Dept: [phone]  Dept Fax: [phone]    History and Physical  Patient:  Maureen Askew  YOB: 1975  Date of Service:  2022    Subjective:   Maureen Askew (:  1975) is a 48 y.o. female, Established patient, here for evaluation of the following chief complaint(s):    Chief Complaint   Patient presents with    Tinnitus     c/o ringing and \"whooshing\" sound in both ears for about a month or more, is distracting does not interfer with hearing    Referral - General     to orthopedic for meniscus tearing states lost referral from hospital and nutritionist      Patient complains of ringing and swooshing to bilateral ear. Symptoms started 4 weeks ago and is constant. She denies having pain, pressure,  drainage, or hearing loss. She states it is annoying and distracting. She is using the cpap nightly. She also complains of having muscle cramps. She would like a referral to talk with a dietician. The ASCVD Risk score (Ko Urena, et al., 2013) failed to calculate for the following reasons:     The patient has a prior MI or stroke diagnosis     BP Readings from Last 3 Encounters:   22 116/80   22 118/74   22 118/70      Pulse Readings from Last 3 Encounters:   22 87   22 66   22 62      Wt Readings from Last 3 Encounters:   22 210 lb (95.3 kg)   22 203 lb 9.6 oz (92.4 kg)   21 201 lb 4.8 oz (91.3 kg)        Allergies   Allergen Reactions    Dexlansoprazole     Indomethacin     Tetanus Toxoid        Current Outpatient Medications   Medication Sig Dispense Refill    potassium chloride (KLOR-CON M) 10 MEQ extended release tablet Take 1 tablet by mouth daily 30 tablet 0    amitriptyline (ELAVIL) 10 MG tablet TAKE 1 TABLET BY MOUTH EVERY DAY AT BEDTIME FOR 30 DAYS      montelukast (SINGULAIR) 10 MG tablet TAKE 1 TABLET BY MOUTH EVERY DAY  triamcinolone (KENALOG) 0.1 % cream Apply topically 2 times daily. 80 g 1    isosorbide mononitrate (IMDUR) 60 MG extended release tablet TAKE 1 TABLET BY MOUTH EVERY DAY IN THE MORNING      ELIQUIS 5 MG TABS tablet       atorvastatin (LIPITOR) 40 MG tablet Take 40 mg by mouth nightly      aspirin 81 MG EC tablet Take 81 mg by mouth daily      Multiple Vitamin (MULTIVITAMIN ADULT PO) Take by mouth      hydroxychloroquine (PLAQUENIL) 200 MG tablet take 1 tablet by mouth twice a day      labetalol (NORMODYNE) 100 MG tablet take 1 tablet by mouth twice a day      NIFEdipine (PROCARDIA XL) 90 MG extended release tablet take 1 tablet by mouth once daily      olmesartan-hydroCHLOROthiazide (BENICAR HCT) 40-25 MG per tablet take 1 tablet by mouth once daily      ferrous sulfate (IRON 325) 325 (65 Fe) MG tablet 325 mg 2 times daily      vitamin D3 (CHOLECALCIFEROL) 25 MCG (1000 UT) TABS tablet Take 2 tablets by mouth daily (Patient not taking: Reported on 3/1/2022) 30 tablet 0     Current Facility-Administered Medications   Medication Dose Route Frequency Provider Last Rate Last Admin    lidocaine 1 % injection 1 mL  1 mL Intra-artICUlar Once Melisa Cotto MD            Past Medical History:   Diagnosis Date    Adenomyosis     Chronic fatigue     Constipation 6/1/2021    Heart attack ([facility] [address] 05/19/2021    History of myocardial infarction 5/17/2022    Hypertension     Iron deficiency anemia     NSTEMI (non-ST elevated myocardial infarction) ([facility] [address] 5/26/2021    Orthostatic hypotension 5/25/2021    CALLY (obstructive sleep apnea)        Past Surgical History:   Procedure Laterality Date    HYSTERECTOMY, VAGINAL      LYMPH NODE BIOPSY       No family history on file.   Social History     Tobacco Use    Smoking status: Never Smoker    Smokeless tobacco: Never Used   Vaping Use    Vaping Use: Never used   Substance Use Topics    Alcohol use: Not on file    Drug use: Not on file       Review of Systems:     Review of Systems   Constitutional: Positive for fatigue. Negative for chills and fever. HENT: Positive for tinnitus (bilateral). Negative for ear discharge, ear pain, postnasal drip, rhinorrhea and sinus pain. Respiratory: Negative for cough, shortness of breath and wheezing. Cardiovascular: Negative for chest pain. Gastrointestinal: Negative for nausea. Musculoskeletal: Positive for myalgias (muscle cramps). Allergic/Immunologic: Negative for environmental allergies. PHQ Scores 3/1/2022 5/10/2021 5/6/2020 1/16/2019   PHQ2 Score 0 0 0 0   PHQ9 Score 0 0 0 0     Interpretation of Total Score Depression Severity: 1-4 = Minimal depression, 5-9 = Mild depression, 10-14 = Moderate depression, 15-19 = Moderately severe depression, 20-27 = Severe depression     Physical Exam:     Vitals:    05/17/22 0958   BP: 116/80   Pulse: 87   SpO2: 96%      There is no height or weight on file to calculate BMI. Physical Exam  Constitutional:       Appearance: Normal appearance. HENT:      Head: Normocephalic. Right Ear: Tympanic membrane and ear canal normal.      Left Ear: Tympanic membrane and ear canal normal.   Eyes:      Conjunctiva/sclera: Conjunctivae normal.   Neck:      Vascular: No carotid bruit. Cardiovascular:      Rate and Rhythm: Normal rate and regular rhythm. Heart sounds: Normal heart sounds. Pulmonary:      Effort: Pulmonary effort is normal.      Breath sounds: Normal breath sounds. No wheezing. Musculoskeletal:      Cervical back: Neck supple. Lymphadenopathy:      Cervical: No cervical adenopathy. Neurological:      Mental Status: She is alert and oriented to person, place, and time. Gait: Gait normal.   Psychiatric:         Mood and Affect: Mood normal.         Assessment/Plan:   1. Tinnitus of both ears  2. Primary hypertension  -     Amb External Referral To Nutrition Services  3.  Occlusion of LAD (left anterior descending) artery (HCC)  -     Amb External Referral To Nutrition Services  4. Class 1 obesity due to excess calories with serious comorbidity and body mass index (BMI) of 30.0 to 30.9 in adult  -     Amb External Referral To Nutrition Services  5. CALLY (obstructive sleep apnea)  -     Amb External Referral To Nutrition Services  6. Vitamin D deficiency  -     Vitamin D 25 Hydroxy; Future  7. Hypokalemia  -     potassium chloride (KLOR-CON M) 10 MEQ extended release tablet; Take 1 tablet by mouth daily, Disp-30 tablet, R-0Normal  -     Potassium; Future  8. Chronic fatigue syndrome  9. Systemic lupus erythematosus-related syndrome ([facility] [address]  10. Vitiligo  11. Iron deficiency anemia, unspecified iron deficiency anemia type     All patient questions answered. Patient voiced understanding. Instructed to continue current medications. Patient agreed with treatment plan. Follow up as directed. Return if symptoms worsen or fail to improve. Please note that this chart was generated using voice recognition Dragon dictation software. Although every effort was made to ensure the accuracy of this automated transcription, some errors in transcription may have occurred.     Electronically signed by ZOE Canchola CNP on 5/22/2022

## 2022-05-22 PROBLEM — E66.09 CLASS 1 OBESITY DUE TO EXCESS CALORIES WITH SERIOUS COMORBIDITY AND BODY MASS INDEX (BMI) OF 30.0 TO 30.9 IN ADULT: Status: ACTIVE | Noted: 2022-05-22

## 2022-05-22 PROBLEM — I21.4 NSTEMI (NON-ST ELEVATED MYOCARDIAL INFARCTION) (HCC): Status: RESOLVED | Noted: 2021-05-26 | Resolved: 2022-05-22

## 2022-05-22 PROBLEM — D69.6 THROMBOCYTOPENIA (HCC): Status: RESOLVED | Noted: 2021-05-26 | Resolved: 2022-05-22

## 2022-05-22 PROBLEM — R82.90 URINE FINDINGS ABNORMAL: Status: RESOLVED | Noted: 2022-03-01 | Resolved: 2022-05-22

## 2022-05-22 PROBLEM — E66.811 CLASS 1 OBESITY DUE TO EXCESS CALORIES WITH SERIOUS COMORBIDITY AND BODY MASS INDEX (BMI) OF 30.0 TO 30.9 IN ADULT: Status: ACTIVE | Noted: 2022-05-22

## 2022-05-22 PROBLEM — R07.89 PRESSURE IN CHEST: Status: RESOLVED | Noted: 2021-06-01 | Resolved: 2022-05-22

## 2022-05-22 PROBLEM — H93.13 TINNITUS OF BOTH EARS: Status: ACTIVE | Noted: 2022-05-22

## 2022-05-22 PROBLEM — E87.6 HYPOKALEMIA: Status: ACTIVE | Noted: 2022-05-22

## 2022-05-22 PROBLEM — M32.9 SYSTEMIC LUPUS ERYTHEMATOSUS (HCC): Status: RESOLVED | Noted: 2020-10-30 | Resolved: 2022-05-22

## 2022-05-22 PROBLEM — R30.0 DYSURIA: Status: RESOLVED | Noted: 2022-03-01 | Resolved: 2022-05-22

## 2022-05-22 PROBLEM — I95.1 ORTHOSTATIC HYPOTENSION: Status: RESOLVED | Noted: 2021-05-25 | Resolved: 2022-05-22

## 2022-05-22 PROBLEM — R42 DIZZINESS: Status: RESOLVED | Noted: 2021-05-25 | Resolved: 2022-05-22

## 2022-05-22 PROBLEM — K59.00 CONSTIPATION: Status: RESOLVED | Noted: 2021-06-01 | Resolved: 2022-05-22

## 2022-05-22 PROBLEM — I25.2 HISTORY OF MYOCARDIAL INFARCTION: Status: RESOLVED | Noted: 2022-05-17 | Resolved: 2022-05-22

## 2022-05-22 ASSESSMENT — ENCOUNTER SYMPTOMS
SHORTNESS OF BREATH: 0
WHEEZING: 0
RHINORRHEA: 0
SINUS PAIN: 0
COUGH: 0
NAUSEA: 0

## 2022-05-31 LAB
POTASSIUM SERPL-SCNC: 3.7 MMOL/L (ref 3.6–5)
VITAMIN D 25-HYDROXY: 41.2 NG/ML (ref 30–100)

## 2022-06-06 ENCOUNTER — TELEPHONE (OUTPATIENT)
Dept: FAMILY MEDICINE CLINIC | Age: 47
End: 2022-06-06

## 2022-06-06 DIAGNOSIS — H93.13 TINNITUS OF BOTH EARS: Primary | ICD-10-CM

## 2022-06-06 NOTE — TELEPHONE ENCOUNTER
Just letting you know the allergy medicine is not helping with the ear problem and so would like to pursue referral to ENT.

## 2022-06-07 DIAGNOSIS — E55.9 VITAMIN D DEFICIENCY: ICD-10-CM

## 2022-06-07 DIAGNOSIS — E87.6 HYPOKALEMIA: ICD-10-CM

## 2022-06-10 DIAGNOSIS — E87.6 HYPOKALEMIA: ICD-10-CM

## 2022-06-13 RX ORDER — POTASSIUM CHLORIDE 750 MG/1
TABLET, EXTENDED RELEASE ORAL
Qty: 30 TABLET | Refills: 0 | Status: SHIPPED | OUTPATIENT
Start: 2022-06-13 | End: 2022-07-14

## 2022-07-14 DIAGNOSIS — E87.6 HYPOKALEMIA: ICD-10-CM

## 2022-07-14 RX ORDER — POTASSIUM CHLORIDE 750 MG/1
TABLET, EXTENDED RELEASE ORAL
Qty: 30 TABLET | Refills: 0 | Status: SHIPPED | OUTPATIENT
Start: 2022-07-14 | End: 2022-08-08

## 2022-07-14 NOTE — TELEPHONE ENCOUNTER
[facility] is calling to request a refill on the following medication(s):  Requested Prescriptions     Pending Prescriptions Disp Refills    KLOR-CON M10 10 MEQ extended release tablet [Pharmacy Med Name: KLOR-CON M10 TABLET] 30 tablet 0     Sig: TAKE 1 TABLET BY MOUTH EVERY DAY       Last Visit Date (If Applicable):  5/50/0060    Next Visit Date:    Visit date not found

## 2022-08-06 DIAGNOSIS — E87.6 HYPOKALEMIA: ICD-10-CM

## 2022-08-08 RX ORDER — POTASSIUM CHLORIDE 750 MG/1
TABLET, EXTENDED RELEASE ORAL
Qty: 30 TABLET | Refills: 1 | Status: SHIPPED | OUTPATIENT
Start: 2022-08-08 | End: 2022-09-06

## 2022-08-08 NOTE — TELEPHONE ENCOUNTER
[facility]algisa Magaña is calling to request a refill on the following medication(s):  Requested Prescriptions     Pending Prescriptions Disp Refills    KLOR-CON M10 10 MEQ extended release tablet [Pharmacy Med Name: KLOR-CON M10 TABLET] 30 tablet 0     Sig: TAKE 1 TABLET BY MOUTH EVERY DAY       Last Visit Date (If Applicable):  4/58/7814    Next Visit Date:    Visit date not found

## 2022-09-02 DIAGNOSIS — E87.6 HYPOKALEMIA: ICD-10-CM

## 2022-09-06 RX ORDER — POTASSIUM CHLORIDE 750 MG/1
TABLET, EXTENDED RELEASE ORAL
Qty: 30 TABLET | Refills: 1 | Status: SHIPPED | OUTPATIENT
Start: 2022-09-06 | End: 2022-10-03

## 2022-09-06 NOTE — TELEPHONE ENCOUNTER
Chris Christianson is calling to request a refill on the following medication(s):  Requested Prescriptions     Pending Prescriptions Disp Refills    KLOR-CON M10 10 MEQ extended release tablet [Pharmacy Med Name: KLOR-CON M10 TABLET] 30 tablet 1     Sig: TAKE 1 TABLET BY MOUTH EVERY DAY       Last Visit Date (If Applicable):  7/64/8112    Next Visit Date:    Visit date not found

## 2022-10-01 DIAGNOSIS — E87.6 HYPOKALEMIA: ICD-10-CM

## 2022-10-03 RX ORDER — POTASSIUM CHLORIDE 750 MG/1
TABLET, EXTENDED RELEASE ORAL
Qty: 30 TABLET | Refills: 3 | Status: SHIPPED | OUTPATIENT
Start: 2022-10-03 | End: 2022-11-09

## 2022-10-03 NOTE — TELEPHONE ENCOUNTER
Norma Roach is calling to request a refill on the following medication(s):  Requested Prescriptions     Pending Prescriptions Disp Refills    KLOR-CON M10 10 MEQ extended release tablet [Pharmacy Med Name: KLOR-CON M10 TABLET] 30 tablet 1     Sig: TAKE 1 TABLET BY MOUTH EVERY DAY       Last Visit Date (If Applicable):  2/48/0986    Next Visit Date:    10/4/2022

## 2022-10-04 ENCOUNTER — OFFICE VISIT (OUTPATIENT)
Dept: FAMILY MEDICINE CLINIC | Age: 47
End: 2022-10-04
Payer: COMMERCIAL

## 2022-10-04 VITALS — HEART RATE: 60 BPM | SYSTOLIC BLOOD PRESSURE: 132 MMHG | OXYGEN SATURATION: 98 % | DIASTOLIC BLOOD PRESSURE: 94 MMHG

## 2022-10-04 DIAGNOSIS — M54.50 ACUTE RIGHT-SIDED LOW BACK PAIN WITHOUT SCIATICA: Primary | ICD-10-CM

## 2022-10-04 LAB
BILIRUBIN, POC: NORMAL
BLOOD URINE, POC: NORMAL
CLARITY, POC: CLEAR
COLOR, POC: YELLOW
GLUCOSE URINE, POC: NORMAL
KETONES, POC: NORMAL
LEUKOCYTE EST, POC: NORMAL
NITRITE, POC: NORMAL
PH, POC: 6
PROTEIN, POC: NORMAL
SPECIFIC GRAVITY, POC: 1
UROBILINOGEN, POC: NORMAL

## 2022-10-04 PROCEDURE — 99213 OFFICE O/P EST LOW 20 MIN: CPT | Performed by: NURSE PRACTITIONER

## 2022-10-04 PROCEDURE — 81002 URINALYSIS NONAUTO W/O SCOPE: CPT | Performed by: NURSE PRACTITIONER

## 2022-10-04 RX ORDER — METHYLPREDNISOLONE 4 MG/1
TABLET ORAL
Qty: 1 KIT | Refills: 0 | Status: SHIPPED | OUTPATIENT
Start: 2022-10-04 | End: 2022-10-10

## 2022-10-04 NOTE — PROGRESS NOTES
[address] [address] [address]  Dept: [phone]  Dept Fax: [phone]    History and Physical  Patient:  Ruslan Vallejo  YOB: 1975  Date of Service:  10/4/2022    Assessment/Plan:   1. Acute right-sided low back pain without sciatica  -     methylPREDNISolone (MEDROL, JUANCARLOS,) 4 MG tablet; Take by mouth as directed per instructions on dose pack. Take with food. , Disp-1 kit, R-0Normal  -     POCT Urinalysis no Micro      All patient questions answered. Patient voiced understanding. Instructed to continue current medications. Patient agreed with treatment plan. Follow up as directed. Return if symptoms worsen or fail to improve. Subjective:   Ruslan Vallejo (:  1975) is a 50 y.o. female, Established patient, here for evaluation of the following chief complaint(s):    Chief Complaint   Patient presents with    Hip Pain     C/o right sided hip pain feels like bone on bone for past month and a half. No trouble walking, but if laying flat and then go to rise will cause pain, heating pad helps some      Having lower back pain on the right, no injury, does not radiate. Back Pain  This is a new problem. The current episode started 1 to 4 weeks ago (4 weeks). The problem occurs daily. The problem is unchanged. The pain is present in the lumbar spine and gluteal (right). The quality of the pain is described as aching. The pain does not radiate. The pain is moderate. The symptoms are aggravated by sitting. Associated symptoms include bladder incontinence (mild wears a pad). Pertinent negatives include no bowel incontinence, chest pain, dysuria, leg pain, numbness, tingling or weakness. She has tried home exercises, NSAIDs and analgesics for the symptoms. The treatment provided no relief. The ASCVD Risk score (Kristen TINSLEY, et al., 2019) failed to calculate for the following reasons:     The patient has a prior MI or stroke diagnosis     BP Readings from Last 3 Encounters:   10/04/22 (!) 132/94   05/17/22 116/80   05/12/22 118/74      Pulse Readings from Last 3 Encounters:   10/04/22 60   05/17/22 87   05/12/22 66      Wt Readings from Last 3 Encounters:   05/12/22 210 lb (95.3 kg)   03/01/22 203 lb 9.6 oz (92.4 kg)   12/06/21 201 lb 4.8 oz (91.3 kg)        Allergies   Allergen Reactions    Dexlansoprazole     Indomethacin     Tetanus Toxoid        Current Outpatient Medications   Medication Sig Dispense Refill    KLOR-CON M10 10 MEQ extended release tablet TAKE 1 TABLET BY MOUTH EVERY DAY 30 tablet 3    amitriptyline (ELAVIL) 10 MG tablet TAKE 1 TABLET BY MOUTH EVERY DAY AT BEDTIME FOR 30 DAYS      montelukast (SINGULAIR) 10 MG tablet TAKE 1 TABLET BY MOUTH EVERY DAY      vitamin D3 (CHOLECALCIFEROL) 25 MCG (1000 UT) TABS tablet Take 2 tablets by mouth daily 30 tablet 0    triamcinolone (KENALOG) 0.1 % cream Apply topically 2 times daily.  80 g 1    isosorbide mononitrate (IMDUR) 60 MG extended release tablet TAKE 1 TABLET BY MOUTH EVERY DAY IN THE MORNING      ELIQUIS 5 MG TABS tablet       atorvastatin (LIPITOR) 40 MG tablet Take 40 mg by mouth nightly      aspirin 81 MG EC tablet Take 81 mg by mouth daily      Multiple Vitamin (MULTIVITAMIN ADULT PO) Take by mouth      hydroxychloroquine (PLAQUENIL) 200 MG tablet take 1 tablet by mouth twice a day      labetalol (NORMODYNE) 100 MG tablet take 1 tablet by mouth twice a day      NIFEdipine (PROCARDIA XL) 90 MG extended release tablet take 1 tablet by mouth once daily      olmesartan-hydroCHLOROthiazide (BENICAR HCT) 40-25 MG per tablet take 1 tablet by mouth once daily      ferrous sulfate (IRON 325) 325 (65 Fe) MG tablet 325 mg 2 times daily       Current Facility-Administered Medications   Medication Dose Route Frequency Provider Last Rate Last Admin    lidocaine 1 % injection 1 mL  1 mL Intra-artICUlar Once Ronaldo Stevenson MD            Past Medical History:   Diagnosis Date Adenomyosis     Chronic fatigue     Constipation 6/1/2021    Heart attack ([facility] [address] 05/19/2021    History of myocardial infarction 5/17/2022    Hypertension     Iron deficiency anemia     NSTEMI (non-ST elevated myocardial infarction) ([facility] [address] 5/26/2021    Orthostatic hypotension 5/25/2021    CALLY (obstructive sleep apnea)        Past Surgical History:   Procedure Laterality Date    HYSTERECTOMY, VAGINAL      LYMPH NODE BIOPSY       No family history on file. Social History     Tobacco Use    Smoking status: Never    Smokeless tobacco: Never   Vaping Use    Vaping Use: Never used       Review of Systems:     Review of Systems   HENT: Negative. Respiratory: Negative. Cardiovascular:  Negative for chest pain. Gastrointestinal:  Negative for bowel incontinence. Genitourinary:  Positive for bladder incontinence (mild wears a pad). Negative for dysuria, frequency and urgency. Musculoskeletal:  Positive for back pain (lower back on right). Neurological:  Negative for tingling, weakness and numbness. Psychiatric/Behavioral:  Negative for sleep disturbance. PHQ Scores 3/1/2022 5/10/2021 5/6/2020 1/16/2019   PHQ2 Score 0 0 0 0   PHQ9 Score 0 0 0 0     Interpretation of Total Score Depression Severity: 1-4 = Minimal depression, 5-9 = Mild depression, 10-14 = Moderate depression, 15-19 = Moderately severe depression, 20-27 = Severe depression     Physical Exam:     Vitals:    10/04/22 1601   BP: (!) 132/94   Pulse: 60   SpO2: 98%      There is no height or weight on file to calculate BMI. Physical Exam  Constitutional:       Appearance: Normal appearance. She is well-developed and well-groomed. HENT:      Head: Normocephalic. Eyes:      Conjunctiva/sclera: Conjunctivae normal.   Neck:      Thyroid: No thyromegaly. Cardiovascular:      Rate and Rhythm: Normal rate and regular rhythm. Heart sounds: Normal heart sounds.    Pulmonary:      Effort: Pulmonary effort is normal.      Breath sounds: Normal breath sounds. No wheezing. Abdominal:      General: Bowel sounds are normal.      Palpations: Abdomen is soft. Tenderness: There is no abdominal tenderness. There is no right CVA tenderness or left CVA tenderness. Musculoskeletal:      Cervical back: Neck supple. Lumbar back: Tenderness present. Decreased range of motion (mildly). Negative right straight leg raise test and negative left straight leg raise test.   Skin:     Capillary Refill: Capillary refill takes less than 2 seconds. Neurological:      Mental Status: She is alert and oriented to person, place, and time. Gait: Gait normal.   Psychiatric:         Behavior: Behavior is cooperative. Please note that this chart was generated using voice recognition Dragon dictation software. Although every effort was made to ensure the accuracy of this automated transcription, some errors in transcription may have occurred.     Electronically signed by ZOE Goodman CNP on 10/15/2022

## 2022-10-15 ASSESSMENT — ENCOUNTER SYMPTOMS
RESPIRATORY NEGATIVE: 1
BOWEL INCONTINENCE: 0
BACK PAIN: 1

## 2022-11-08 ENCOUNTER — OFFICE VISIT (OUTPATIENT)
Dept: FAMILY MEDICINE CLINIC | Age: 47
End: 2022-11-08
Payer: COMMERCIAL

## 2022-11-08 VITALS
HEIGHT: 68 IN | WEIGHT: 210 LBS | HEART RATE: 76 BPM | SYSTOLIC BLOOD PRESSURE: 110 MMHG | DIASTOLIC BLOOD PRESSURE: 76 MMHG | OXYGEN SATURATION: 97 % | BODY MASS INDEX: 31.83 KG/M2

## 2022-11-08 DIAGNOSIS — G93.32 CHRONIC FATIGUE SYNDROME: ICD-10-CM

## 2022-11-08 DIAGNOSIS — E55.9 VITAMIN D DEFICIENCY: ICD-10-CM

## 2022-11-08 DIAGNOSIS — R32 URINARY INCONTINENCE, UNSPECIFIED TYPE: Primary | ICD-10-CM

## 2022-11-08 DIAGNOSIS — R51.9 NONINTRACTABLE HEADACHE, UNSPECIFIED CHRONICITY PATTERN, UNSPECIFIED HEADACHE TYPE: ICD-10-CM

## 2022-11-08 LAB
ANION GAP SERPL CALCULATED.3IONS-SCNC: 9.4 MMOL/L
BASOPHILS %: 1.63 (ref 0–3)
BASOPHILS ABSOLUTE: 0.05 (ref 0–0.3)
BUN BLDV-MCNC: 19 MG/DL (ref 7–17)
CALCIUM SERPL-MCNC: 9.4 MG/DL (ref 8.4–10.2)
CHLORIDE BLD-SCNC: 102 MMOL/L (ref 98–120)
CO2: 30 MMOL/L (ref 22–31)
CREAT SERPL-MCNC: 0.9 MG/DL (ref 0.5–1)
EOSINOPHILS %: 5.47 (ref 0–10)
EOSINOPHILS ABSOLUTE: 0.16 (ref 0–1.1)
GFR CALCULATED: > 60
GLUCOSE: 89 MG/DL (ref 65–105)
HBA1C MFR BLD: 5.5 % (ref 4.4–6.4)
HCT VFR BLD CALC: 44.1 % (ref 37–47)
HEMOGLOBIN: 14.7 (ref 12–16)
LYMPHOCYTE %: 26.61 (ref 20–51.1)
LYMPHOCYTES ABSOLUTE: 0.79 (ref 1–5.5)
MCH RBC QN AUTO: 31.3 PG (ref 28.5–32.5)
MCHC RBC AUTO-ENTMCNC: 33.4 G/DL (ref 32–37)
MCV RBC AUTO: 94 FL (ref 80–94)
MONOCYTES %: 12.57 (ref 1.7–9.3)
MONOCYTES ABSOLUTE: 0.37 (ref 0.1–1)
NEUTROPHILS %: 53.72 (ref 42.2–75.2)
NEUTROPHILS ABSOLUTE: 1.6 (ref 2–8.1)
PDW BLD-RTO: 11.7 % (ref 8.5–15.5)
PLATELET # BLD: 154.8 THOU/MM3 (ref 130–400)
POTASSIUM SERPL-SCNC: 4.2 MMOL/L (ref 3.6–5)
RBC: 4.69 M/UL (ref 4.2–5.4)
SODIUM BLD-SCNC: 142 MMOL/L (ref 135–145)
TSH REFLEX FT4: 1.56 MIU/ML (ref 0.49–4.67)
VITAMIN D 25-HYDROXY: 30.7 NG/ML (ref 30–100)
WBC: 3 THOU/ML3 (ref 4.8–10.8)

## 2022-11-08 PROCEDURE — 99213 OFFICE O/P EST LOW 20 MIN: CPT | Performed by: NURSE PRACTITIONER

## 2022-11-08 PROCEDURE — 3078F DIAST BP <80 MM HG: CPT | Performed by: NURSE PRACTITIONER

## 2022-11-08 PROCEDURE — 3074F SYST BP LT 130 MM HG: CPT | Performed by: NURSE PRACTITIONER

## 2022-11-08 PROCEDURE — 99212 OFFICE O/P EST SF 10 MIN: CPT | Performed by: NURSE PRACTITIONER

## 2022-11-08 RX ORDER — OXYBUTYNIN CHLORIDE 5 MG/1
5 TABLET, EXTENDED RELEASE ORAL DAILY
Qty: 30 TABLET | Refills: 2 | Status: SHIPPED | OUTPATIENT
Start: 2022-11-08

## 2022-11-08 RX ORDER — NIFEDIPINE 90 MG/1
TABLET, FILM COATED, EXTENDED RELEASE ORAL
COMMUNITY
Start: 2022-11-06

## 2022-11-08 NOTE — PROGRESS NOTES
[address] [address] [address]  Dept: [phone]  Dept Fax: [phone]    History and Physical  Patient:  Valeria Villa  YOB: 1975  Date of Service:  2022    Assessment/Plan:   1. Urinary incontinence, unspecified type  -     Ej Mercer MD, Urology, [place]  -     oxybutynin (DITROPAN XL) 5 MG extended release tablet; Take 1 tablet by mouth daily, Disp-30 tablet, R-2Normal  2. Chronic fatigue syndrome  -     Hemoglobin A1C; Future  -     TSH with Reflex; Future  -     CBC with Auto Differential; Future  -     Basic Metabolic Panel; Future  -     Vitamin D 25 Hydroxy; Future  3. Vitamin D deficiency  -     Vitamin D 25 Hydroxy; Future  4. Nonintractable headache, unspecified chronicity pattern, unspecified headache type  -     Hemoglobin A1C; Future  -     TSH with Reflex; Future  -     CBC with Auto Differential; Future  -     Basic Metabolic Panel; Future  -     Vitamin D 25 Hydroxy; Future     Start oxybutynin as discussed to help with bladder leakage. Discussed keeping a food/headache diary to narrow down possible food triggers. Instructed to drink plenty of water and avoid caffeine, alcohol, and high sugar containing foods. All patient questions answered. Patient voiced understanding. Instructed to continue current medications. Patient agreed with treatment plan. Follow up as directed. No follow-ups on file. Subjective:   Valeria Villa (:  1975) is a 50 y.o. female, Established patient, here for evaluation of the following chief complaint(s):    Chief Complaint   Patient presents with    Headache     X 1 month-notices after eating occurs       Started to have headaches after eating about 1 month ago. It is not a migraine and onset is gradual. Headache will last different durations. She does not take anything for the headache. She is not sure of the location.  She has been avoiding eating so she can feel better. She usually eats when she gets to work, oatmeal or grits and breakfast sandwich. She does not drink coffee. She drinks plenty of water. She also complains of having bladder leakage. States she always has to wear a panty liner. Started after the hysterectomy. Incontinence  This is a chronic problem. The current episode started more than 1 month ago (started after hysterectomy). The problem is unchanged. Aggravating factors include unknown. (awake and asleep, requires a panty liner) Frequency of incontinence is constant. Patient is aware of incontinence. Protection used includes: pads. Absorbent products are currently damp. Patient's risk factors for incontinence are hysterectomy. Caffeine is consumed rarely. Prior evaluation included none. The ASCVD Risk score (Kristen TINSLEY, et al., 2019) failed to calculate for the following reasons: The patient has a prior MI or stroke diagnosis     BP Readings from Last 3 Encounters:   11/08/22 110/76   10/04/22 (!) 132/94   05/17/22 116/80      Pulse Readings from Last 3 Encounters:   11/08/22 76   10/04/22 60   05/17/22 87      Wt Readings from Last 3 Encounters:   11/08/22 210 lb (95.3 kg)   05/12/22 210 lb (95.3 kg)   03/01/22 203 lb 9.6 oz (92.4 kg)        Allergies   Allergen Reactions    Dexlansoprazole     Indomethacin     Tetanus Toxoid        Current Outpatient Medications   Medication Sig Dispense Refill    NIFEdipine (ADALAT CC) 90 MG extended release tablet       oxybutynin (DITROPAN XL) 5 MG extended release tablet Take 1 tablet by mouth daily 30 tablet 2    amitriptyline (ELAVIL) 10 MG tablet TAKE 1 TABLET BY MOUTH EVERY DAY AT BEDTIME FOR 30 DAYS      montelukast (SINGULAIR) 10 MG tablet TAKE 1 TABLET BY MOUTH EVERY DAY      triamcinolone (KENALOG) 0.1 % cream Apply topically 2 times daily.  80 g 1    isosorbide mononitrate (IMDUR) 60 MG extended release tablet TAKE 1 TABLET BY MOUTH EVERY DAY IN THE MORNING      ELIQUIS 5 MG TABS tablet       atorvastatin (LIPITOR) 40 MG tablet Take 40 mg by mouth nightly      aspirin 81 MG EC tablet Take 81 mg by mouth daily      Multiple Vitamin (MULTIVITAMIN ADULT PO) Take by mouth      hydroxychloroquine (PLAQUENIL) 200 MG tablet take 1 tablet by mouth twice a day      labetalol (NORMODYNE) 100 MG tablet take 1 tablet by mouth twice a day      NIFEdipine (PROCARDIA XL) 90 MG extended release tablet take 1 tablet by mouth once daily      olmesartan-hydroCHLOROthiazide (BENICAR HCT) 40-25 MG per tablet take 1 tablet by mouth once daily      ferrous sulfate (IRON 325) 325 (65 Fe) MG tablet 325 mg 2 times daily      KLOR-CON M10 10 MEQ extended release tablet TAKE 1 TABLET BY MOUTH EVERY DAY 90 tablet 1     Current Facility-Administered Medications   Medication Dose Route Frequency Provider Last Rate Last Admin    lidocaine 1 % injection 1 mL  1 mL Intra-artICUlar Once Von Jones MD            Past Medical History:   Diagnosis Date    Adenomyosis     Chronic fatigue     Constipation 6/1/2021    Heart attack ([facility] [address] 05/19/2021    History of myocardial infarction 5/17/2022    Hypertension     Iron deficiency anemia     NSTEMI (non-ST elevated myocardial infarction) ([facility] [address] 5/26/2021    Orthostatic hypotension 5/25/2021    CALLY (obstructive sleep apnea)        Past Surgical History:   Procedure Laterality Date    HYSTERECTOMY, VAGINAL      LYMPH NODE BIOPSY       No family history on file. Social History     Tobacco Use    Smoking status: Never    Smokeless tobacco: Never   Vaping Use    Vaping Use: Never used       Review of Systems:     Review of Systems   Constitutional:  Positive for fatigue. Negative for appetite change, chills and fever. HENT: Negative. Eyes: Negative. Respiratory:  Negative for cough, shortness of breath and wheezing. Cardiovascular:  Negative for chest pain, palpitations and leg swelling.    Gastrointestinal:  Negative for abdominal pain, constipation and diarrhea. Endocrine: Negative for cold intolerance, heat intolerance, polydipsia, polyphagia and polyuria. Genitourinary:  Positive for bladder incontinence. Bladder leaking   Musculoskeletal:  Negative for arthralgias and myalgias. Skin: Negative. Allergic/Immunologic: Negative for environmental allergies and food allergies. Neurological:  Positive for headaches (after eating). Negative for dizziness, weakness and light-headedness. Psychiatric/Behavioral:  Negative for agitation, dysphoric mood, self-injury, sleep disturbance and suicidal ideas. The patient is not nervous/anxious. PHQ Scores 3/1/2022 5/10/2021 5/6/2020 1/16/2019   PHQ2 Score 0 0 0 0   PHQ9 Score 0 0 0 0     Interpretation of Total Score Depression Severity: 1-4 = Minimal depression, 5-9 = Mild depression, 10-14 = Moderate depression, 15-19 = Moderately severe depression, 20-27 = Severe depression     Physical Exam:     Vitals:    11/08/22 0903   BP: 110/76   Site: Left Upper Arm   Position: Sitting   Cuff Size: Small Adult   Pulse: 76   SpO2: 97%   Weight: 210 lb (95.3 kg)   Height: 5' 8\" (1.727 m)      Body mass index is 31.93 kg/m². Physical Exam  Constitutional:       Appearance: Normal appearance. She is well-developed and well-groomed. HENT:      Head: Normocephalic. Eyes:      Conjunctiva/sclera: Conjunctivae normal.   Neck:      Thyroid: No thyromegaly. Vascular: No carotid bruit. Cardiovascular:      Rate and Rhythm: Normal rate and regular rhythm. Heart sounds: Normal heart sounds. Pulmonary:      Effort: Pulmonary effort is normal.      Breath sounds: Normal breath sounds. No wheezing. Abdominal:      General: Bowel sounds are normal.      Palpations: Abdomen is soft. Tenderness: There is no abdominal tenderness. Musculoskeletal:      Cervical back: Neck supple. Right lower leg: No edema. Left lower leg: No edema. Lymphadenopathy:      Cervical: No cervical adenopathy. Skin:     Capillary Refill: Capillary refill takes less than 2 seconds. Neurological:      Mental Status: She is alert and oriented to person, place, and time. Gait: Gait normal.   Psychiatric:         Mood and Affect: Mood normal.         Behavior: Behavior is cooperative. Please note that this chart was generated using voice recognition Dragon dictation software. Although every effort was made to ensure the accuracy of this automated transcription, some errors in transcription may have occurred.     Electronically signed by ZOE Reza CNP on 11/13/2022

## 2022-11-09 DIAGNOSIS — E87.6 HYPOKALEMIA: ICD-10-CM

## 2022-11-09 RX ORDER — POTASSIUM CHLORIDE 750 MG/1
TABLET, EXTENDED RELEASE ORAL
Qty: 90 TABLET | Refills: 1 | Status: SHIPPED | OUTPATIENT
Start: 2022-11-09

## 2022-11-09 NOTE — TELEPHONE ENCOUNTER
Bruna Garcia is calling to request a refill on the following medication(s):  Requested Prescriptions     Pending Prescriptions Disp Refills    KLOR-CON M10 10 MEQ extended release tablet [Pharmacy Med Name: KLOR-CON M10 TABLET] 90 tablet 1     Sig: TAKE 1 TABLET BY MOUTH EVERY DAY       Last Visit Date (If Applicable):  49/1/7144    Next Visit Date:    Visit date not found

## 2022-11-10 DIAGNOSIS — E55.9 VITAMIN D DEFICIENCY: ICD-10-CM

## 2022-11-10 DIAGNOSIS — G93.32 CHRONIC FATIGUE SYNDROME: ICD-10-CM

## 2022-11-10 DIAGNOSIS — R51.9 NONINTRACTABLE HEADACHE, UNSPECIFIED CHRONICITY PATTERN, UNSPECIFIED HEADACHE TYPE: ICD-10-CM

## 2022-11-13 ASSESSMENT — ENCOUNTER SYMPTOMS
DIARRHEA: 0
SHORTNESS OF BREATH: 0
CONSTIPATION: 0
WHEEZING: 0
ABDOMINAL PAIN: 0
COUGH: 0
EYES NEGATIVE: 1

## 2022-11-14 ENCOUNTER — TELEPHONE (OUTPATIENT)
Dept: FAMILY MEDICINE CLINIC | Age: 47
End: 2022-11-14

## 2022-12-01 DIAGNOSIS — R32 URINARY INCONTINENCE, UNSPECIFIED TYPE: ICD-10-CM

## 2022-12-01 NOTE — TELEPHONE ENCOUNTER
Miri Wilson is calling to request a refill on the following medication(s):  Requested Prescriptions     Pending Prescriptions Disp Refills    oxybutynin (DITROPAN-XL) 5 MG extended release tablet [Pharmacy Med Name: OXYBUTYNIN CL ER 5 MG TABLET] 30 tablet 2     Sig: TAKE 1 TABLET BY MOUTH EVERY DAY       Last Visit Date (If Applicable):  45/9/6364    Next Visit Date:    Visit date not found

## 2022-12-02 RX ORDER — OXYBUTYNIN CHLORIDE 5 MG/1
TABLET, EXTENDED RELEASE ORAL
Qty: 30 TABLET | Refills: 5 | Status: SHIPPED | OUTPATIENT
Start: 2022-12-02

## 2022-12-28 ENCOUNTER — OFFICE VISIT (OUTPATIENT)
Dept: UROLOGY | Age: 47
End: 2022-12-28
Payer: COMMERCIAL

## 2022-12-28 VITALS
DIASTOLIC BLOOD PRESSURE: 82 MMHG | HEART RATE: 64 BPM | BODY MASS INDEX: 31.93 KG/M2 | OXYGEN SATURATION: 98 % | SYSTOLIC BLOOD PRESSURE: 122 MMHG | HEIGHT: 68 IN

## 2022-12-28 DIAGNOSIS — N39.3 PRIMARY STRESS URINARY INCONTINENCE: Primary | ICD-10-CM

## 2022-12-28 DIAGNOSIS — R35.1 NOCTURIA: ICD-10-CM

## 2022-12-28 PROCEDURE — 3078F DIAST BP <80 MM HG: CPT | Performed by: UROLOGY

## 2022-12-28 PROCEDURE — 3074F SYST BP LT 130 MM HG: CPT | Performed by: UROLOGY

## 2022-12-28 PROCEDURE — 99204 OFFICE O/P NEW MOD 45 MIN: CPT | Performed by: UROLOGY

## 2022-12-28 NOTE — PROGRESS NOTES
Dr. Katrina Talbert MD MD  [facility] Consultation / New Patient Visit    Patient:  Norma Roach  YOB: 1975  Date: 12/28/2022  Consult requested from ZOE Jackson CNP     HISTORY OF PRESENT ILLNESS:   The patient is a 50 y.o. female who presents today for follow-up for the following problem(s): Stress urinary incontinence  Overall the problem(s) : are worsening. Associated Symptoms: No dysuria, gross hematuria. Pain Severity: Pain Score:   0 - No pain    Today visit:   12/28/22   Presents with isolated stress incontinence, PPD: 2, damp. Which is bothersome. She also has nocturia. Summary of old records:   (Patient's old records, notes and chart reviewed and summarized above.)    Urinalysis today:  No results found for this visit on 12/28/22. Last BUN and creatinine:  Lab Results   Component Value Date    BUN 19 (H) 11/08/2022     Lab Results   Component Value Date    CREATININE 0.9 11/08/2022       Imaging Reviewed during this Office Visit:   (results were independently reviewed by physician and radiology report verified)    PAST MEDICAL, FAMILY AND SOCIAL HISTORY:  Past Medical History:   Diagnosis Date    Adenomyosis     Chronic fatigue     Constipation 6/1/2021    Heart attack ([facility] [address] 05/19/2021    History of myocardial infarction 5/17/2022    Hypertension     Iron deficiency anemia     NSTEMI (non-ST elevated myocardial infarction) ([facility] [address] 5/26/2021    Orthostatic hypotension 5/25/2021    CALLY (obstructive sleep apnea)      Past Surgical History:   Procedure Laterality Date    HYSTERECTOMY, VAGINAL      LYMPH NODE BIOPSY       No family history on file.   Outpatient Medications Marked as Taking for the 12/28/22 encounter (Office Visit) with Radhames Yu MD   Medication Sig Dispense Refill    oxybutynin (DITROPAN-XL) 5 MG extended release tablet TAKE 1 TABLET BY MOUTH EVERY DAY 30 tablet 5    KLOR-CON M10 10 MEQ extended release tablet TAKE 1 TABLET BY MOUTH EVERY DAY 90 tablet 1    NIFEdipine (ADALAT CC) 90 MG extended release tablet       amitriptyline (ELAVIL) 10 MG tablet TAKE 1 TABLET BY MOUTH EVERY DAY AT BEDTIME FOR 30 DAYS      montelukast (SINGULAIR) 10 MG tablet TAKE 1 TABLET BY MOUTH EVERY DAY      triamcinolone (KENALOG) 0.1 % cream Apply topically 2 times daily. 80 g 1    isosorbide mononitrate (IMDUR) 60 MG extended release tablet TAKE 1 TABLET BY MOUTH EVERY DAY IN THE MORNING      ELIQUIS 5 MG TABS tablet       atorvastatin (LIPITOR) 40 MG tablet Take 40 mg by mouth nightly      aspirin 81 MG EC tablet Take 81 mg by mouth daily      Multiple Vitamin (MULTIVITAMIN ADULT PO) Take by mouth      hydroxychloroquine (PLAQUENIL) 200 MG tablet take 1 tablet by mouth twice a day      labetalol (NORMODYNE) 100 MG tablet take 1 tablet by mouth twice a day      NIFEdipine (PROCARDIA XL) 90 MG extended release tablet take 1 tablet by mouth once daily      olmesartan-hydroCHLOROthiazide (BENICAR HCT) 40-25 MG per tablet take 1 tablet by mouth once daily      ferrous sulfate (IRON 325) 325 (65 Fe) MG tablet 325 mg 2 times daily         Dexlansoprazole, Indomethacin, and Tetanus toxoid  Social History     Tobacco Use   Smoking Status Never   Smokeless Tobacco Never       Social History     Substance and Sexual Activity   Alcohol Use None       REVIEW OF SYSTEMS:  Constitutional: negative  Eyes: negative  Respiratory: negative  Cardiovascular: negative  Gastrointestinal: negative  Genitourinary: negative  Musculoskeletal: negative  Skin: negative   Neurological: negative  Hematological/Lymphatic: negative  Psychological: negative    Physical Exam:    This a 50 y.o. female      Vitals:    12/28/22 0923   BP: 122/82   Pulse: 64   SpO2: 98%     Constitutional: Patient in no acute distress   Neuro: alert and oriented to person place and time.     Psych: Mood and affect normal.  Head: atraumatic normocephalic  Eyes: EOMi  HEENT: neck supple, trachea midline  Lungs:

## 2023-01-23 ENCOUNTER — OFFICE VISIT (OUTPATIENT)
Dept: FAMILY MEDICINE CLINIC | Age: 48
End: 2023-01-23
Payer: COMMERCIAL

## 2023-01-23 VITALS
DIASTOLIC BLOOD PRESSURE: 82 MMHG | WEIGHT: 211.4 LBS | SYSTOLIC BLOOD PRESSURE: 130 MMHG | HEIGHT: 68 IN | BODY MASS INDEX: 32.04 KG/M2

## 2023-01-23 DIAGNOSIS — M67.432 GANGLION CYST OF WRIST, LEFT: Primary | ICD-10-CM

## 2023-01-23 PROCEDURE — 3074F SYST BP LT 130 MM HG: CPT | Performed by: NURSE PRACTITIONER

## 2023-01-23 PROCEDURE — 3078F DIAST BP <80 MM HG: CPT | Performed by: NURSE PRACTITIONER

## 2023-01-23 PROCEDURE — 99212 OFFICE O/P EST SF 10 MIN: CPT | Performed by: NURSE PRACTITIONER

## 2023-01-23 ASSESSMENT — PATIENT HEALTH QUESTIONNAIRE - PHQ9
SUM OF ALL RESPONSES TO PHQ QUESTIONS 1-9: 0
2. FEELING DOWN, DEPRESSED OR HOPELESS: 0
SUM OF ALL RESPONSES TO PHQ QUESTIONS 1-9: 0
1. LITTLE INTEREST OR PLEASURE IN DOING THINGS: 0
SUM OF ALL RESPONSES TO PHQ9 QUESTIONS 1 & 2: 0
SUM OF ALL RESPONSES TO PHQ QUESTIONS 1-9: 0
SUM OF ALL RESPONSES TO PHQ QUESTIONS 1-9: 0

## 2023-01-23 NOTE — PROGRESS NOTES
[address] [address] [address]  Dept: [phone]  Dept Fax: [phone]    Date of Service:  2023    Gab Heath (:  1975) is a 50 y.o. female, here for evaluation of the following chief complaint(s):    Chief Complaint   Patient presents with    Wrist Pain     C/o having a painful knot on left wrist started last week, now also c/o right wrist pain        Assessment/Plan:   1. Ganglion cyst of wrist, left     Patient reports cyst has decreased in size and seems to be improving. Offered reassurance and instructed to continue monitoring. Follow up if symptoms worsen. All patient questions answered. Patient voiced understanding. Instructed to continue current medications. Patient agreed with treatment plan. Follow up as directed. Return if symptoms worsen or fail to improve. Subjective:     1.5 weeks ago noticed a bump and pain to the left wrist. Tried ice but did not help. Has started getting better, bump is getting smaller. Having a rough day today, was not able to eat lunch.      BP Readings from Last 3 Encounters:   23 130/82   22 122/82   22 110/76      Pulse Readings from Last 3 Encounters:   22 64   22 76   10/04/22 60      Wt Readings from Last 3 Encounters:   23 211 lb 6.4 oz (95.9 kg)   22 210 lb (95.3 kg)   22 210 lb (95.3 kg)        Current Outpatient Medications   Medication Sig Dispense Refill    oxybutynin (DITROPAN-XL) 5 MG extended release tablet TAKE 1 TABLET BY MOUTH EVERY DAY 30 tablet 5    KLOR-CON M10 10 MEQ extended release tablet TAKE 1 TABLET BY MOUTH EVERY DAY 90 tablet 1    NIFEdipine (ADALAT CC) 90 MG extended release tablet       amitriptyline (ELAVIL) 10 MG tablet TAKE 1 TABLET BY MOUTH EVERY DAY AT BEDTIME FOR 30 DAYS      montelukast (SINGULAIR) 10 MG tablet TAKE 1 TABLET BY MOUTH EVERY DAY      triamcinolone (KENALOG) 0.1 % cream Apply topically 2 times daily. 80 g 1    isosorbide mononitrate (IMDUR) 60 MG extended release tablet TAKE 1 TABLET BY MOUTH EVERY DAY IN THE MORNING      ELIQUIS 5 MG TABS tablet       atorvastatin (LIPITOR) 40 MG tablet Take 40 mg by mouth nightly      aspirin 81 MG EC tablet Take 81 mg by mouth daily      Multiple Vitamin (MULTIVITAMIN ADULT PO) Take by mouth      hydroxychloroquine (PLAQUENIL) 200 MG tablet take 1 tablet by mouth twice a day      labetalol (NORMODYNE) 100 MG tablet take 1 tablet by mouth twice a day      NIFEdipine (PROCARDIA XL) 90 MG extended release tablet take 1 tablet by mouth once daily      olmesartan-hydroCHLOROthiazide (BENICAR HCT) 40-25 MG per tablet take 1 tablet by mouth once daily      ferrous sulfate (IRON 325) 325 (65 Fe) MG tablet 325 mg 2 times daily       Current Facility-Administered Medications   Medication Dose Route Frequency Provider Last Rate Last Admin    lidocaine 1 % injection 1 mL  1 mL Intra-artICUlar Once Lisa Massey MD            Past Medical History:   Diagnosis Date    Adenomyosis     Chronic fatigue     Constipation 6/1/2021    Heart attack ([facility] [address] 05/19/2021    History of myocardial infarction 5/17/2022    Hypertension     Iron deficiency anemia     NSTEMI (non-ST elevated myocardial infarction) ([facility] [address] 5/26/2021    Orthostatic hypotension 5/25/2021    CALLY (obstructive sleep apnea)        Past Surgical History:   Procedure Laterality Date    HYSTERECTOMY, VAGINAL      LYMPH NODE BIOPSY         Social History     Tobacco Use    Smoking status: Never    Smokeless tobacco: Never   Vaping Use    Vaping Use: Never used      No family history on file. Review of Systems:     Review of Systems   Musculoskeletal:  Positive for arthralgias (left wrist).      PHQ Scores 1/23/2023 3/1/2022 5/10/2021 5/6/2020 1/16/2019   PHQ2 Score 0 0 0 0 0   PHQ9 Score 0 0 0 0 0     Interpretation of Total Score Depression Severity: 1-4 = Minimal depression, 5-9 = Mild depression, 10-14 = Moderate depression, 15-19 = Moderately severe depression, 20-27 = Severe depression     Physical Exam:     Vitals:    01/23/23 1551 01/23/23 1620   BP: (!) 136/92 130/82   Weight: 211 lb 6.4 oz (95.9 kg)    Height: 5' 8.11\" (1.73 m)       Body mass index is 32.04 kg/m². Physical Exam  Constitutional:       Appearance: Normal appearance. HENT:      Head: Normocephalic. Eyes:      Conjunctiva/sclera: Conjunctivae normal.   Cardiovascular:      Rate and Rhythm: Normal rate and regular rhythm. Heart sounds: Normal heart sounds. Pulmonary:      Effort: Pulmonary effort is normal.      Breath sounds: Normal breath sounds. No wheezing. Musculoskeletal:      Left wrist: Deformity (cyst noted to left dorsal surface) present. No tenderness. Normal range of motion. Normal pulse. Cervical back: Neck supple. Neurological:      Mental Status: She is alert and oriented to person, place, and time. Psychiatric:         Behavior: Behavior is agitated. Behavior is cooperative. Please note that this chart was generated using voice recognition Dragon dictation software. Although every effort was made to ensure the accuracy of this automated transcription, some errors in transcription may have occurred.     Electronically signed by ZOE Eldridge CNP on 1/29/2023

## 2023-01-29 ASSESSMENT — JOINT PAIN
TOTAL NUMBER OF TENDER JOINTS: 0
TOTAL NUMBER OF SWOLLEN JOINTS: 1

## 2023-03-21 ENCOUNTER — OFFICE VISIT (OUTPATIENT)
Dept: FAMILY MEDICINE CLINIC | Age: 48
End: 2023-03-21
Payer: COMMERCIAL

## 2023-03-21 VITALS
DIASTOLIC BLOOD PRESSURE: 80 MMHG | HEART RATE: 88 BPM | OXYGEN SATURATION: 98 % | SYSTOLIC BLOOD PRESSURE: 110 MMHG | WEIGHT: 208 LBS | HEIGHT: 68 IN | BODY MASS INDEX: 31.52 KG/M2

## 2023-03-21 DIAGNOSIS — R19.8 GENERALIZED ABDOMINAL FULLNESS: ICD-10-CM

## 2023-03-21 DIAGNOSIS — J30.9 ALLERGIC RHINITIS, UNSPECIFIED SEASONALITY, UNSPECIFIED TRIGGER: Primary | ICD-10-CM

## 2023-03-21 PROCEDURE — 3074F SYST BP LT 130 MM HG: CPT | Performed by: NURSE PRACTITIONER

## 2023-03-21 PROCEDURE — 99213 OFFICE O/P EST LOW 20 MIN: CPT | Performed by: NURSE PRACTITIONER

## 2023-03-21 PROCEDURE — 3079F DIAST BP 80-89 MM HG: CPT | Performed by: NURSE PRACTITIONER

## 2023-03-21 RX ORDER — LORATADINE 10 MG/1
10 TABLET ORAL DAILY
Qty: 30 TABLET | Refills: 0 | COMMUNITY
Start: 2023-03-21 | End: 2023-04-20

## 2023-03-21 SDOH — ECONOMIC STABILITY: FOOD INSECURITY: WITHIN THE PAST 12 MONTHS, THE FOOD YOU BOUGHT JUST DIDN'T LAST AND YOU DIDN'T HAVE MONEY TO GET MORE.: NEVER TRUE

## 2023-03-21 SDOH — ECONOMIC STABILITY: INCOME INSECURITY: HOW HARD IS IT FOR YOU TO PAY FOR THE VERY BASICS LIKE FOOD, HOUSING, MEDICAL CARE, AND HEATING?: NOT HARD AT ALL

## 2023-03-21 SDOH — ECONOMIC STABILITY: HOUSING INSECURITY
IN THE LAST 12 MONTHS, WAS THERE A TIME WHEN YOU DID NOT HAVE A STEADY PLACE TO SLEEP OR SLEPT IN A SHELTER (INCLUDING NOW)?: NO

## 2023-03-21 SDOH — ECONOMIC STABILITY: FOOD INSECURITY: WITHIN THE PAST 12 MONTHS, YOU WORRIED THAT YOUR FOOD WOULD RUN OUT BEFORE YOU GOT MONEY TO BUY MORE.: NEVER TRUE

## 2023-03-21 ASSESSMENT — ENCOUNTER SYMPTOMS
CHANGE IN BOWEL HABIT: 0
ABDOMINAL DISTENTION: 1
NAUSEA: 0
RHINORRHEA: 0
CONSTIPATION: 0
DIARRHEA: 0
SORE THROAT: 0
SHORTNESS OF BREATH: 0
SINUS PRESSURE: 0
COUGH: 1
WHEEZING: 0
SWOLLEN GLANDS: 0
ABDOMINAL PAIN: 0

## 2023-03-21 NOTE — PROGRESS NOTES
Respiratory:  Positive for cough (occasionally productive yellow/red). Negative for shortness of breath and wheezing. Cardiovascular:  Negative for chest pain. Gastrointestinal:  Positive for abdominal distention. Negative for abdominal pain, change in bowel habit, constipation, diarrhea and nausea. Allergic/Immunologic: Negative for environmental allergies. PHQ Scores 1/23/2023 3/1/2022 5/10/2021 5/6/2020 1/16/2019   PHQ2 Score 0 0 0 0 0   PHQ9 Score 0 0 0 0 0     Interpretation of Total Score Depression Severity: 1-4 = Minimal depression, 5-9 = Mild depression, 10-14 = Moderate depression, 15-19 = Moderately severe depression, 20-27 = Severe depression     Objective:     Vitals:    03/21/23 1527   BP: 110/80   Site: Left Upper Arm   Position: Sitting   Cuff Size: Small Adult   Pulse: 88   SpO2: 98%   Weight: 208 lb (94.3 kg)   Height: 5' 8\" (1.727 m)        Physical Exam  Constitutional:       Appearance: Normal appearance. She is obese. HENT:      Head: Normocephalic. Right Ear: Tympanic membrane and ear canal normal.      Left Ear: Tympanic membrane and ear canal normal.      Nose: Rhinorrhea present. Right Turbinates: Pale. Left Turbinates: Pale. Right Sinus: No maxillary sinus tenderness or frontal sinus tenderness. Left Sinus: No maxillary sinus tenderness or frontal sinus tenderness. Mouth/Throat:      Pharynx: Oropharynx is clear. Eyes:      Conjunctiva/sclera: Conjunctivae normal.   Cardiovascular:      Rate and Rhythm: Normal rate and regular rhythm. Heart sounds: Normal heart sounds. Pulmonary:      Effort: Pulmonary effort is normal.      Breath sounds: Normal breath sounds. No wheezing. Abdominal:      General: Bowel sounds are normal. There is no distension. Palpations: Abdomen is soft. Tenderness: There is no abdominal tenderness. Musculoskeletal:      Cervical back: Neck supple.    Lymphadenopathy:      Cervical: No cervical

## 2023-05-15 ENCOUNTER — TELEPHONE (OUTPATIENT)
Dept: FAMILY MEDICINE CLINIC | Age: 48
End: 2023-05-15

## 2023-05-15 NOTE — TELEPHONE ENCOUNTER
Dr Lona Contreras releasing her from his care. She just wants to make sure you will take over prescribing her Eliquis in future. Doesn't need a script right now. Dr Lona Contreras told her to check with you to make sure.

## 2023-06-08 ENCOUNTER — TELEPHONE (OUTPATIENT)
Dept: FAMILY MEDICINE CLINIC | Age: 48
End: 2023-06-08

## 2023-06-08 DIAGNOSIS — I24.0 OCCLUSION OF LAD (LEFT ANTERIOR DESCENDING) ARTERY (HCC): Primary | ICD-10-CM

## 2023-06-08 RX ORDER — APIXABAN 5 MG/1
5 TABLET, FILM COATED ORAL 2 TIMES DAILY
Qty: 60 TABLET | Refills: 5 | Status: SHIPPED | OUTPATIENT
Start: 2023-06-08

## 2023-06-08 NOTE — TELEPHONE ENCOUNTER
Jess Bran is calling to request a refill on the following medication(s):  Requested Prescriptions     Pending Prescriptions Disp Refills    ELIQUIS 5 MG TABS tablet 60 tablet 5     Sig: Take 1 tablet by mouth 2 times daily       Last Visit Date (If Applicable):  2/16/0839    Next Visit Date:    Visit date not found

## 2023-06-17 DIAGNOSIS — R32 URINARY INCONTINENCE, UNSPECIFIED TYPE: ICD-10-CM

## 2023-06-19 NOTE — TELEPHONE ENCOUNTER
Ana Lilia Suresh is calling to request a refill on the following medication(s):  Requested Prescriptions     Pending Prescriptions Disp Refills    oxybutynin (DITROPAN-XL) 5 MG extended release tablet [Pharmacy Med Name: OXYBUTYNIN CL ER 5 MG TABLET] 90 tablet 1     Sig: TAKE 1 TABLET BY MOUTH EVERY DAY       Last Visit Date (If Applicable):  9/31/2836    Next Visit Date:    Visit date not found

## 2023-06-20 RX ORDER — OXYBUTYNIN CHLORIDE 5 MG/1
TABLET, EXTENDED RELEASE ORAL
Qty: 90 TABLET | Refills: 1 | Status: SHIPPED | OUTPATIENT
Start: 2023-06-20

## 2023-06-23 DIAGNOSIS — E87.6 HYPOKALEMIA: ICD-10-CM

## 2023-06-23 RX ORDER — POTASSIUM CHLORIDE 750 MG/1
TABLET, EXTENDED RELEASE ORAL
Qty: 90 TABLET | Refills: 1 | Status: SHIPPED | OUTPATIENT
Start: 2023-06-23

## 2023-06-23 NOTE — TELEPHONE ENCOUNTER
Myla Álvarez is calling to request a refill on the following medication(s):  Requested Prescriptions     Pending Prescriptions Disp Refills    KLOR-CON M10 10 MEQ extended release tablet [Pharmacy Med Name: KLOR-CON M10 TABLET] 90 tablet 1     Sig: TAKE 1 TABLET BY MOUTH EVERY DAY       Last Visit Date (If Applicable):  1/66/5080    Next Visit Date:    Visit date not found

## 2023-07-18 ENCOUNTER — TELEPHONE (OUTPATIENT)
Dept: FAMILY MEDICINE CLINIC | Age: 48
End: 2023-07-18

## 2023-07-24 ENCOUNTER — OFFICE VISIT (OUTPATIENT)
Dept: FAMILY MEDICINE CLINIC | Age: 48
End: 2023-07-24
Payer: COMMERCIAL

## 2023-07-24 VITALS
SYSTOLIC BLOOD PRESSURE: 122 MMHG | BODY MASS INDEX: 33.12 KG/M2 | WEIGHT: 217.8 LBS | OXYGEN SATURATION: 97 % | DIASTOLIC BLOOD PRESSURE: 80 MMHG | HEART RATE: 65 BPM

## 2023-07-24 DIAGNOSIS — E78.5 HYPERLIPIDEMIA, UNSPECIFIED HYPERLIPIDEMIA TYPE: ICD-10-CM

## 2023-07-24 DIAGNOSIS — E55.9 VITAMIN D DEFICIENCY: ICD-10-CM

## 2023-07-24 DIAGNOSIS — M79.18 MYALGIA, MULTIPLE SITES: ICD-10-CM

## 2023-07-24 DIAGNOSIS — R25.2 BILATERAL LEG CRAMPS: Primary | ICD-10-CM

## 2023-07-24 DIAGNOSIS — G47.33 OSA (OBSTRUCTIVE SLEEP APNEA): ICD-10-CM

## 2023-07-24 DIAGNOSIS — Z13.6 SCREENING FOR CARDIOVASCULAR CONDITION: ICD-10-CM

## 2023-07-24 DIAGNOSIS — M32.9 SYSTEMIC LUPUS ERYTHEMATOSUS, UNSPECIFIED SLE TYPE, UNSPECIFIED ORGAN INVOLVEMENT STATUS (HCC): ICD-10-CM

## 2023-07-24 PROCEDURE — 99213 OFFICE O/P EST LOW 20 MIN: CPT | Performed by: NURSE PRACTITIONER

## 2023-07-24 PROCEDURE — 3074F SYST BP LT 130 MM HG: CPT | Performed by: NURSE PRACTITIONER

## 2023-07-24 PROCEDURE — 3078F DIAST BP <80 MM HG: CPT | Performed by: NURSE PRACTITIONER

## 2023-07-24 RX ORDER — LANOLIN ALCOHOL/MO/W.PET/CERES
400 CREAM (GRAM) TOPICAL DAILY
COMMUNITY

## 2023-07-24 NOTE — PROGRESS NOTES
[address] [facility]  [address] [facility], [address] [place], [address]  Dept: [phone]  Dept Fax: [phone]    Date of Service:  7/24/2023    Boo Jacome is a 50 y.o. female who presents in office today with Self. Chief Complaint   Patient presents with    Pain     C/o leg and arm pain. States her legs hurt when goes from sitting to standing. And bilateral arm pain left is worse states feels like something is squeezing them for past couple weeks. Diagnoses / Plan:   1. Bilateral leg cramps  -     Vitamin D 25 Hydroxy  2. Vitamin D deficiency  -     Vitamin D 25 Hydroxy  3. Myalgia, multiple sites  -     Vitamin D 25 Hydroxy  4. Hyperlipidemia, unspecified hyperlipidemia type  -     Lipid Panel  5. Systemic lupus erythematosus, unspecified SLE type, unspecified organ involvement status [facility])  Assessment & Plan:  Under the care of rheumatology. Current symptoms likely related. 6. Screening for cardiovascular condition  -     Lipid Panel  7. CALLY (obstructive sleep apnea)  Assessment & Plan:   Monitored by specialist- no acute findings meriting change in the plan. Wears the cpap nightly. Recommend follow up with rheumatology. Suspect symptoms related to SLE. Encouraged healthy diet and routine exercise. Instructed to continue current medications. All patient questions answered. Patient voiced understanding. Return if symptoms worsen or fail to improve. Subjective:   History of Present Illness:  Presents complaining of feeling more tired and having discomfort to BUE and BLE. She describes the pain as a squeeze, legs feel achy. Seems worse in the morning and gradually improves as the day progresses. If she sits for a period of time, symptoms return. She is drinking plenty of water. She cannot drive more than 30 minutes without getting extremely tired. Diagnosed with sleep apnea 2-3 years ago. She is faithful in wearing the cpap every night.  Recently has

## 2023-07-26 LAB
CHOLESTEROL/HDL RATIO: 3.1 RATIO (ref 0–4.5)
CHOLESTEROL: 124 MG/DL (ref 50–200)
HDLC SERPL-MCNC: 40 MG/DL (ref 36–68)
LDL CHOLESTEROL CALCULATED: 62.8 MG/DL (ref 0–160)
TRIGL SERPL-MCNC: 106 MG/DL (ref 10–250)
VITAMIN D 25-HYDROXY: 77.7 NG/ML (ref 30–100)
VLDLC SERPL CALC-MCNC: 21 MG/DL (ref 0–50)

## 2023-07-29 ASSESSMENT — ENCOUNTER SYMPTOMS: RESPIRATORY NEGATIVE: 1

## 2023-07-31 ENCOUNTER — TELEPHONE (OUTPATIENT)
Dept: FAMILY MEDICINE CLINIC | Age: 48
End: 2023-07-31

## 2023-07-31 DIAGNOSIS — L29.9 CHRONIC PRURITUS: Primary | ICD-10-CM

## 2023-08-28 DIAGNOSIS — R32 URINARY INCONTINENCE, UNSPECIFIED TYPE: ICD-10-CM

## 2023-08-28 RX ORDER — OXYBUTYNIN CHLORIDE 5 MG/1
TABLET, EXTENDED RELEASE ORAL
Qty: 90 TABLET | Refills: 1 | Status: SHIPPED | OUTPATIENT
Start: 2023-08-28

## 2023-08-28 NOTE — TELEPHONE ENCOUNTER
Huston Gottron is calling to request a refill on the following medication(s):  Requested Prescriptions     Pending Prescriptions Disp Refills    oxybutynin (DITROPAN-XL) 5 MG extended release tablet [Pharmacy Med Name: OXYBUTYNIN CL ER 5 MG TABLET] 90 tablet 1     Sig: TAKE 1 TABLET BY MOUTH EVERY DAY       Last Visit Date (If Applicable):  1/45/5947    Next Visit Date:    Visit date not found

## 2023-09-19 ENCOUNTER — TELEPHONE (OUTPATIENT)
Dept: SURGERY | Age: 48
End: 2023-09-19

## 2023-09-25 ENCOUNTER — OFFICE VISIT (OUTPATIENT)
Dept: FAMILY MEDICINE CLINIC | Age: 48
End: 2023-09-25
Payer: COMMERCIAL

## 2023-09-25 VITALS
SYSTOLIC BLOOD PRESSURE: 136 MMHG | BODY MASS INDEX: 32.87 KG/M2 | OXYGEN SATURATION: 98 % | WEIGHT: 216.2 LBS | HEART RATE: 78 BPM | DIASTOLIC BLOOD PRESSURE: 92 MMHG

## 2023-09-25 DIAGNOSIS — M67.432 GANGLION CYST OF DORSUM OF LEFT WRIST: ICD-10-CM

## 2023-09-25 DIAGNOSIS — G89.29 CHRONIC PAIN OF LEFT KNEE: Primary | ICD-10-CM

## 2023-09-25 DIAGNOSIS — M25.562 CHRONIC PAIN OF LEFT KNEE: Primary | ICD-10-CM

## 2023-09-25 PROCEDURE — 3078F DIAST BP <80 MM HG: CPT | Performed by: NURSE PRACTITIONER

## 2023-09-25 PROCEDURE — 99213 OFFICE O/P EST LOW 20 MIN: CPT | Performed by: NURSE PRACTITIONER

## 2023-09-25 PROCEDURE — 3074F SYST BP LT 130 MM HG: CPT | Performed by: NURSE PRACTITIONER

## 2023-09-25 NOTE — PROGRESS NOTES
kg)        Current Outpatient Medications   Medication Sig Dispense Refill    MAGNESIUM GLYCINATE PO Take by mouth      oxybutynin (DITROPAN-XL) 5 MG extended release tablet TAKE 1 TABLET BY MOUTH EVERY DAY 90 tablet 1    magnesium oxide (MAG-OX) 400 (240 Mg) MG tablet Take 1 tablet by mouth daily      KLOR-CON M10 10 MEQ extended release tablet TAKE 1 TABLET BY MOUTH EVERY DAY 90 tablet 1    NIFEdipine (ADALAT CC) 90 MG extended release tablet       amitriptyline (ELAVIL) 10 MG tablet TAKE 1 TABLET BY MOUTH EVERY DAY AT BEDTIME FOR 30 DAYS      montelukast (SINGULAIR) 10 MG tablet TAKE 1 TABLET BY MOUTH EVERY DAY      triamcinolone (KENALOG) 0.1 % cream Apply topically 2 times daily. 80 g 1    isosorbide mononitrate (IMDUR) 60 MG extended release tablet TAKE 1 TABLET BY MOUTH EVERY DAY IN THE MORNING      atorvastatin (LIPITOR) 40 MG tablet Take 1 tablet by mouth nightly      aspirin 81 MG EC tablet Take 1 tablet by mouth daily      Multiple Vitamin (MULTIVITAMIN ADULT PO) Take by mouth      hydroxychloroquine (PLAQUENIL) 200 MG tablet take 1 tablet by mouth twice a day      labetalol (NORMODYNE) 100 MG tablet take 1 tablet by mouth twice a day      NIFEdipine (PROCARDIA XL) 90 MG extended release tablet take 1 tablet by mouth once daily      olmesartan-hydroCHLOROthiazide (BENICAR HCT) 40-25 MG per tablet take 1 tablet by mouth once daily      ferrous sulfate (IRON 325) 325 (65 Fe) MG tablet 1 tablet  in the morning and 1 tablet in the evening. ELIQUIS 5 MG TABS tablet TAKE 1 TABLET BY MOUTH TWICE A DAY FOR 90 DAYS 90 tablet 2     Current Facility-Administered Medications   Medication Dose Route Frequency Provider Last Rate Last Admin    lidocaine 1 % injection 1 mL  1 mL Intra-artICUlar Once Tawana Aguilar MD         Review of Systems   Musculoskeletal:  Positive for arthralgias (left wrist, knee).             1/23/2023     4:15 PM 3/1/2022     3:15 PM 5/10/2021     2:19 PM 5/6/2020     8:53 AM 1/16/2019

## 2023-09-30 DIAGNOSIS — I24.0 OCCLUSION OF LAD (LEFT ANTERIOR DESCENDING) ARTERY (HCC): ICD-10-CM

## 2023-10-02 RX ORDER — APIXABAN 5 MG/1
5 TABLET, FILM COATED ORAL 2 TIMES DAILY
Qty: 90 TABLET | Refills: 2 | Status: SHIPPED | OUTPATIENT
Start: 2023-10-02 | End: 2023-12-31

## 2023-10-02 NOTE — TELEPHONE ENCOUNTER
Moises Zuleta is calling to request a refill on the following medication(s):  Requested Prescriptions     Pending Prescriptions Disp Refills    ELIQUIS 5 MG TABS tablet [Pharmacy Med Name: ELIQUIS 5 MG TABLET] 90 tablet 2     Sig: TAKE 1 TABLET BY MOUTH TWICE A DAY FOR 80 DAYS       Last Visit Date (If Applicable):  6/07/6976    Next Visit Date:    Visit date not found

## 2023-12-25 DIAGNOSIS — E87.6 HYPOKALEMIA: ICD-10-CM

## 2023-12-26 RX ORDER — POTASSIUM CHLORIDE 750 MG/1
TABLET, EXTENDED RELEASE ORAL
Qty: 90 TABLET | Refills: 0 | Status: SHIPPED | OUTPATIENT
Start: 2023-12-26

## 2023-12-26 NOTE — TELEPHONE ENCOUNTER
Yaritza Devon is calling to request a refill on the following medication(s):  Requested Prescriptions     Pending Prescriptions Disp Refills    KLOR-CON M10 10 MEQ extended release tablet [Pharmacy Med Name: KLOR-CON M10 TABLET] 90 tablet 0     Sig: TAKE 1 TABLET BY MOUTH EVERY DAY       Last Visit Date (If Applicable):  2/39/3565    Next Visit Date:    Visit date not found

## 2024-02-20 ENCOUNTER — OFFICE VISIT (OUTPATIENT)
Dept: FAMILY MEDICINE CLINIC | Age: 49
End: 2024-02-20
Payer: COMMERCIAL

## 2024-02-20 VITALS
HEART RATE: 65 BPM | SYSTOLIC BLOOD PRESSURE: 132 MMHG | WEIGHT: 221.4 LBS | DIASTOLIC BLOOD PRESSURE: 80 MMHG | BODY MASS INDEX: 33.66 KG/M2 | OXYGEN SATURATION: 99 %

## 2024-02-20 DIAGNOSIS — T78.40XA ALLERGIC REACTION, INITIAL ENCOUNTER: ICD-10-CM

## 2024-02-20 DIAGNOSIS — H10.11 ALLERGIC CONJUNCTIVITIS OF RIGHT EYE: Primary | ICD-10-CM

## 2024-02-20 PROCEDURE — 3079F DIAST BP 80-89 MM HG: CPT | Performed by: NURSE PRACTITIONER

## 2024-02-20 PROCEDURE — 99213 OFFICE O/P EST LOW 20 MIN: CPT | Performed by: NURSE PRACTITIONER

## 2024-02-20 PROCEDURE — 3075F SYST BP GE 130 - 139MM HG: CPT | Performed by: NURSE PRACTITIONER

## 2024-02-20 RX ORDER — PREDNISONE 20 MG/1
20 TABLET ORAL DAILY
Qty: 3 TABLET | Refills: 0 | Status: SHIPPED | OUTPATIENT
Start: 2024-02-20 | End: 2024-02-23

## 2024-02-20 ASSESSMENT — PATIENT HEALTH QUESTIONNAIRE - PHQ9
SUM OF ALL RESPONSES TO PHQ QUESTIONS 1-9: 0
2. FEELING DOWN, DEPRESSED OR HOPELESS: 0
SUM OF ALL RESPONSES TO PHQ QUESTIONS 1-9: 0
SUM OF ALL RESPONSES TO PHQ9 QUESTIONS 1 & 2: 0
1. LITTLE INTEREST OR PLEASURE IN DOING THINGS: 0
SUM OF ALL RESPONSES TO PHQ QUESTIONS 1-9: 0
SUM OF ALL RESPONSES TO PHQ QUESTIONS 1-9: 0

## 2024-02-20 NOTE — PROGRESS NOTES
[address], [address]  [place] [id number]  Dept: [phone]  Dept Fax: [phone]    Date of Service:  2/20/2024    Chief Complaint   Patient presents with    Allergic Reaction     C/o was exposed to carpet powder and now has red eyes, has improved some in redness        Diagnoses / Plan:   1. Allergic conjunctivitis of right eye  2. Allergic reaction, initial encounter  -     predniSONE (DELTASONE) 20 MG tablet; Take 1 tablet by mouth daily for 3 days, Disp-3 tablet, R-0Normal     May continue otc eyedrops prn. Start short burst of oral steroid with food.  Follow-up for worsening or persistent symptoms.  Patient verbalizes understanding regarding plan of care and all questions answered.    Return if symptoms worsen or fail to improve.     Subjective:   History of Present Illness:   used carpet powder on the floor. States instantly noticed itchy eyes. Tried eye drops and Benadryl. Symptoms improving, but right eye is still a little irritated,        Current Outpatient Medications   Medication Sig Dispense Refill    KLOR-CON M10 10 MEQ extended release tablet TAKE 1 TABLET BY MOUTH EVERY DAY 90 tablet 0    MAGNESIUM GLYCINATE PO Take by mouth      oxybutynin (DITROPAN-XL) 5 MG extended release tablet TAKE 1 TABLET BY MOUTH EVERY DAY 90 tablet 1    magnesium oxide (MAG-OX) 400 (240 Mg) MG tablet Take 1 tablet by mouth daily      NIFEdipine (ADALAT CC) 90 MG extended release tablet       amitriptyline (ELAVIL) 10 MG tablet TAKE 1 TABLET BY MOUTH EVERY DAY AT BEDTIME FOR 30 DAYS      montelukast (SINGULAIR) 10 MG tablet TAKE 1 TABLET BY MOUTH EVERY DAY      triamcinolone (KENALOG) 0.1 % cream Apply topically 2 times daily. 80 g 1    isosorbide mononitrate (IMDUR) 60 MG extended release tablet TAKE 1 TABLET BY MOUTH EVERY DAY IN THE MORNING      atorvastatin (LIPITOR) 40 MG tablet Take 1 tablet by mouth nightly      aspirin 81 MG EC tablet Take 1 tablet

## 2024-02-27 ENCOUNTER — OFFICE VISIT (OUTPATIENT)
Dept: FAMILY MEDICINE CLINIC | Age: 49
End: 2024-02-27
Payer: COMMERCIAL

## 2024-02-27 VITALS — SYSTOLIC BLOOD PRESSURE: 124 MMHG | OXYGEN SATURATION: 97 % | HEART RATE: 82 BPM | DIASTOLIC BLOOD PRESSURE: 82 MMHG

## 2024-02-27 DIAGNOSIS — M79.644 PAIN OF RIGHT THUMB: Primary | ICD-10-CM

## 2024-02-27 DIAGNOSIS — L03.011 CELLULITIS OF THUMB, RIGHT: ICD-10-CM

## 2024-02-27 LAB — URIC ACID: 7.9 MG/DL (ref 3.5–8.5)

## 2024-02-27 PROCEDURE — 99213 OFFICE O/P EST LOW 20 MIN: CPT | Performed by: NURSE PRACTITIONER

## 2024-02-27 PROCEDURE — 3074F SYST BP LT 130 MM HG: CPT | Performed by: NURSE PRACTITIONER

## 2024-02-27 PROCEDURE — 3079F DIAST BP 80-89 MM HG: CPT | Performed by: NURSE PRACTITIONER

## 2024-02-27 RX ORDER — DOXYCYCLINE HYCLATE 100 MG
100 TABLET ORAL 2 TIMES DAILY
Qty: 20 TABLET | Refills: 0 | Status: SHIPPED | OUTPATIENT
Start: 2024-02-27 | End: 2024-03-08

## 2024-02-27 ASSESSMENT — ENCOUNTER SYMPTOMS: EYE REDNESS: 1

## 2024-02-27 NOTE — PROGRESS NOTES
[address] [facility], [address]  [place] [id number]  Dept: [phone]  Dept Fax: [phone]    Date of Service:  2/27/2024    Chief Complaint   Patient presents with    Swelling     Pt was seen at Urgent Care for swelling in right thumb    Hand Pain     States today on her way to office hit middle finger of left hand and is now swollen        Diagnoses / Plan:   1. Pain of right thumb  2. Cellulitis of thumb, right  -     doxycycline hyclate (VIBRA-TABS) 100 MG tablet; Take 1 tablet by mouth 2 times daily for 10 days, Disp-20 tablet, R-0Normal     Cellulitis vs gout. Start antibiotic for presumed infection. Uric acid to r/o gout. Medication sent to pharmacy.  Discussed medication desired effects and potential side effects. Follow-up for worsening or persistent symptoms.  Patient verbalizes understanding regarding plan of care and all questions answered.    Return if symptoms worsen or fail to improve.     Subjective:   History of Present Illness:  Complains of pain, redness and swelling to the right thumb. Symptoms started 2-3 days ago.     Hand Pain   The incident occurred 2 days ago. There was no injury mechanism. The pain is present in the right hand and right wrist. The quality of the pain is described as aching and shooting. The pain radiates to the right hand. The pain is moderate. The pain has been Fluctuating since the incident. Associated symptoms include muscle weakness (hand weakness due to pain). Pertinent negatives include no chest pain, numbness or tingling. The symptoms are aggravated by movement and palpation. She has tried nothing for the symptoms. The treatment provided no relief.       Current Outpatient Medications   Medication Sig Dispense Refill    doxycycline hyclate (VIBRA-TABS) 100 MG tablet Take 1 tablet by mouth 2 times daily for 10 days 20 tablet 0    KLOR-CON M10 10 MEQ extended release tablet TAKE 1 TABLET BY MOUTH EVERY DAY 90 tablet

## 2024-03-19 DIAGNOSIS — E87.6 HYPOKALEMIA: ICD-10-CM

## 2024-03-20 RX ORDER — POTASSIUM CHLORIDE 750 MG/1
TABLET, EXTENDED RELEASE ORAL
Qty: 90 TABLET | Refills: 1 | Status: SHIPPED | OUTPATIENT
Start: 2024-03-20

## 2024-03-20 NOTE — TELEPHONE ENCOUNTER
Ofelia Hoffman is calling to request a refill on the following medication(s):  Requested Prescriptions     Pending Prescriptions Disp Refills    KLOR-CON M10 10 MEQ extended release tablet [Pharmacy Med Name: KLOR-CON M10 TABLET] 90 tablet 0     Sig: TAKE 1 TABLET BY MOUTH EVERY DAY       Last Visit Date (If Applicable):  2/27/2024    Next Visit Date:    Visit date not found

## 2024-04-03 ENCOUNTER — OFFICE VISIT (OUTPATIENT)
Dept: FAMILY MEDICINE CLINIC | Age: 49
End: 2024-04-03
Payer: COMMERCIAL

## 2024-04-03 VITALS
HEART RATE: 75 BPM | DIASTOLIC BLOOD PRESSURE: 86 MMHG | SYSTOLIC BLOOD PRESSURE: 120 MMHG | BODY MASS INDEX: 33.45 KG/M2 | WEIGHT: 220 LBS | OXYGEN SATURATION: 96 %

## 2024-04-03 DIAGNOSIS — T78.1XXA ALLERGIC REACTION TO FOOD, INITIAL ENCOUNTER: Primary | ICD-10-CM

## 2024-04-03 PROCEDURE — 3079F DIAST BP 80-89 MM HG: CPT | Performed by: NURSE PRACTITIONER

## 2024-04-03 PROCEDURE — 99214 OFFICE O/P EST MOD 30 MIN: CPT | Performed by: NURSE PRACTITIONER

## 2024-04-03 PROCEDURE — 3074F SYST BP LT 130 MM HG: CPT | Performed by: NURSE PRACTITIONER

## 2024-04-03 RX ORDER — EPINEPHRINE 0.3 MG/.3ML
INJECTION SUBCUTANEOUS
Qty: 1 EACH | Refills: 1 | Status: SHIPPED | OUTPATIENT
Start: 2024-04-03

## 2024-04-03 SDOH — ECONOMIC STABILITY: INCOME INSECURITY: HOW HARD IS IT FOR YOU TO PAY FOR THE VERY BASICS LIKE FOOD, HOUSING, MEDICAL CARE, AND HEATING?: NOT HARD AT ALL

## 2024-04-03 SDOH — ECONOMIC STABILITY: FOOD INSECURITY: WITHIN THE PAST 12 MONTHS, THE FOOD YOU BOUGHT JUST DIDN'T LAST AND YOU DIDN'T HAVE MONEY TO GET MORE.: NEVER TRUE

## 2024-04-03 SDOH — ECONOMIC STABILITY: FOOD INSECURITY: WITHIN THE PAST 12 MONTHS, YOU WORRIED THAT YOUR FOOD WOULD RUN OUT BEFORE YOU GOT MONEY TO BUY MORE.: NEVER TRUE

## 2024-04-03 NOTE — PROGRESS NOTES
[address] [address] [facility], [address]  [place] [address]  Dept: [phone]  Dept Fax: [phone]    Date of Service:  4/3/2024    Ofelia Hoffman is a 48 y.o. female who comes in today for follow up of chronic health issues.     Chief Complaint   Patient presents with    Oral Swelling     Noticing tongue swelling and burning with different foods, happens throughout the day, notices with chips and citrus        Diagnoses / Plan:   1. Allergic reaction to food, initial encounter  -     EPINEPHrine (EPIPEN 2-JUANCARLOS) 0.3 MG/0.3ML SOAJ injection; Use as directed for allergic reaction., Disp-1 each, R-1Normal     Had colonoscopy in [place]    EpiPen prescribed prn. Discussed medication desired effects and potential side effects. Recommend referral to allergist. Patient declines at this time. Will hold off on updating the allergy list with foods until confirmation is received.    Reviewed proper use of the pen.  Remove EpiPen from case.  Remove safety cap.  Inject firmly into outer thigh.  Hold in place for several seconds.  Seek emergency medical care.  Dispose of used EpiPen properly.     Lab ordered as discussed to Identify potential food triggers.   Read labels carefully to avoid allergens  Prevent cross-contamination by using separate utensils and surfaces.   Communicate allergies to others and carry emergency medications.   Plan safe meals at home   Exercise caution when dining out     Patient verbalizes understanding regarding plan of care and all questions answered.    Return if symptoms worsen or fail to improve.     Subjective:   History of Present Illness:    Food Allergy: Ofelia Hoffman is here for evaluation of possible food allergy. She reports trying \"Vampire Killer\" at a friends house. States the tongue started to swell immediately. She has known food allergies to pistachios, citrus, chocolate, chips, Ritz crackers, boiled eggs, spaghetti and garlic bread. Symptoms

## 2024-04-07 LAB
ALLERGEN BARLEY IGE: <0.1 KU/L (ref 0–0.34)
ALLERGEN CABBAGE IGE: <0.1 KU/L (ref 0–0.34)
ALLERGEN CARROT IGE: <0.1 KU/L (ref 0–0.34)
ALLERGEN CHICKEN IGE: <0.1 KU/L (ref 0–0.34)
ALLERGEN CODFISH IGE: <0.1 KU/L (ref 0–0.34)
ALLERGEN CORN IGE: <0.1 KU/L (ref 0–0.34)
ALLERGEN CRAB IGE: <0.1 KU/L (ref 0–0.34)
ALLERGEN EGG WHITE IGE: <0.1 KU/L (ref 0–0.34)
ALLERGEN GRAPE IGE: <0.1 KU/L (ref 0–0.34)
ALLERGEN LETTUCE IGE: <0.1 KU/L (ref 0–0.34)
ALLERGEN MILK IGE: <0.1 KU/L (ref 0–0.34)
ALLERGEN NAVY BEAN: <0.1 KU/L (ref 0–0.34)
ALLERGEN OAT: <0.1 KU/L (ref 0–0.34)
ALLERGEN ORANGE IGE: <0.1 KU/L (ref 0–0.34)
ALLERGEN PEANUT (F13) IGE: <0.1 KU/L (ref 0–0.34)
ALLERGEN PEPPER C. ANNUUM IGE: <0.1 KU/L (ref 0–0.34)
ALLERGEN POTATO IGE: <0.1 KU/L (ref 0–0.34)
ALLERGEN RICE IGE: <0.1 KU/L (ref 0–0.34)
ALLERGEN RYE IGE: <0.1 KU/L (ref 0–0.34)
ALLERGEN SHRIMP IGE: 0.1 KU/L (ref 0–0.34)
ALLERGEN SOYBEAN IGE: <0.1 KU/L (ref 0–0.34)
ALLERGEN TOMATO IGE: <0.1 KU/L (ref 0–0.34)
ALLERGEN TUNA IGE: <0.1 KU/L (ref 0–0.34)
ALLERGEN WHEAT IGE: <0.1 KU/L (ref 0–0.34)
BEEF IGE: <0.1 KU/L (ref 0–0.34)
IMMUNOGLOBULIN E: 537 IU/ML (ref 0–100)
PORK IGE: <0.1 KU/L (ref 0–0.34)

## 2024-04-14 ENCOUNTER — TELEPHONE (OUTPATIENT)
Dept: FAMILY MEDICINE CLINIC | Age: 49
End: 2024-04-14

## 2024-04-16 NOTE — TELEPHONE ENCOUNTER
Called pt, pt thinks maybe [facility] or [facility], faxed request to both facilities.

## 2024-04-24 ENCOUNTER — TELEPHONE (OUTPATIENT)
Dept: FAMILY MEDICINE CLINIC | Age: 49
End: 2024-04-24

## 2024-04-24 NOTE — TELEPHONE ENCOUNTER
2019 progress note states pt had colonoscopy at [facility]. Requested reports and no records were found.

## 2024-05-01 DIAGNOSIS — R32 URINARY INCONTINENCE, UNSPECIFIED TYPE: ICD-10-CM

## 2024-05-01 RX ORDER — OXYBUTYNIN CHLORIDE 5 MG/1
TABLET, EXTENDED RELEASE ORAL
Qty: 90 TABLET | Refills: 1 | Status: SHIPPED | OUTPATIENT
Start: 2024-05-01

## 2024-05-01 RX ORDER — TRANEXAMIC ACID 650 MG/1
1300 TABLET ORAL
COMMUNITY
Start: 2021-08-09

## 2024-05-01 RX ORDER — ERGOCALCIFEROL 1.25 MG/1
CAPSULE ORAL
COMMUNITY

## 2024-05-01 NOTE — TELEPHONE ENCOUNTER
Ofelia Hoffman is requesting a refill on the following medication(s):  Requested Prescriptions     Pending Prescriptions Disp Refills    oxyBUTYnin (DITROPAN-XL) 5 MG extended release tablet [Pharmacy Med Name: OXYBUTYNIN CL ER 5 MG TABLET] 90 tablet 1     Sig: TAKE 1 TABLET BY MOUTH EVERY DAY       Last Visit Date (If Applicable):  4/3/2024    Next Visit Date:    Visit date not found

## 2024-06-06 ENCOUNTER — TELEPHONE (OUTPATIENT)
Dept: FAMILY MEDICINE CLINIC | Age: 49
End: 2024-06-06

## 2024-06-06 NOTE — TELEPHONE ENCOUNTER
Thinks her OAS is getting worse.  She is wondering about the results of her labs she had done for her allergy testing in April.

## 2024-06-07 NOTE — TELEPHONE ENCOUNTER
Lab result from April did not reveal anything specific. It did indicate elevated IgE (Immunoglobulin E).  IgE is an antibody produced by the immune system in response to allergens.  This is typically caused from environmental allergies, dermatitis such as eczema, and autoimmune disorders.

## 2024-06-13 ENCOUNTER — TELEPHONE (OUTPATIENT)
Dept: FAMILY MEDICINE CLINIC | Age: 49
End: 2024-06-13

## 2024-06-13 NOTE — TELEPHONE ENCOUNTER
Called pt about her appt scheduled for 6/19, informed her that her ENT specialist would be able to better deal with allergy issues, Pt states she does not see ENT, please place referral.

## 2024-06-19 DIAGNOSIS — J30.9 ALLERGIC RHINITIS, UNSPECIFIED SEASONALITY, UNSPECIFIED TRIGGER: Primary | ICD-10-CM

## 2024-06-19 DIAGNOSIS — Z91.018 MULTIPLE FOOD ALLERGIES: ICD-10-CM

## 2024-06-28 ENCOUNTER — OFFICE VISIT (OUTPATIENT)
Dept: FAMILY MEDICINE CLINIC | Age: 49
End: 2024-06-28
Payer: COMMERCIAL

## 2024-06-28 VITALS
HEART RATE: 74 BPM | DIASTOLIC BLOOD PRESSURE: 86 MMHG | WEIGHT: 222.6 LBS | SYSTOLIC BLOOD PRESSURE: 134 MMHG | BODY MASS INDEX: 33.85 KG/M2 | OXYGEN SATURATION: 98 %

## 2024-06-28 DIAGNOSIS — M25.562 ACUTE PAIN OF LEFT KNEE: Primary | ICD-10-CM

## 2024-06-28 PROCEDURE — 99212 OFFICE O/P EST SF 10 MIN: CPT | Performed by: NURSE PRACTITIONER

## 2024-06-28 NOTE — PROGRESS NOTES
hydroxychloroquine (PLAQUENIL) 200 MG tablet take 1 tablet by mouth twice a day      labetalol (NORMODYNE) 100 MG tablet take 1 tablet by mouth twice a day      NIFEdipine (PROCARDIA XL) 90 MG extended release tablet take 1 tablet by mouth once daily      olmesartan-hydroCHLOROthiazide (BENICAR HCT) 40-25 MG per tablet take 1 tablet by mouth once daily      ferrous sulfate (IRON 325) 325 (65 Fe) MG tablet 1 tablet  in the morning and 1 tablet in the evening.      ELIQUIS 5 MG TABS tablet TAKE 1 TABLET BY MOUTH TWICE A DAY FOR 90 DAYS 90 tablet 2     Current Facility-Administered Medications   Medication Dose Route Frequency Provider Last Rate Last Admin    lidocaine 1 % injection 1 mL  1 mL Intra-artICUlar Once Erlinda Gupta MD         Review of Systems   Constitutional: Negative.    Respiratory: Negative.     Cardiovascular: Negative.    Musculoskeletal:  Positive for arthralgias (left knee) and gait problem (due to left knee pain).   Psychiatric/Behavioral:  Negative for sleep disturbance.           2/20/2024     4:28 PM 1/23/2023     4:15 PM 3/1/2022     3:15 PM 5/10/2021     2:19 PM 5/6/2020     8:53 AM 1/16/2019    10:18 AM   PHQ Scores   PHQ2 Score 0 0 0 0 0 0   PHQ9 Score 0 0 0 0 0 0     Interpretation of Total Score Depression Severity: 1-4 = Minimal depression, 5-9 = Mild depression, 10-14 = Moderate depression, 15-19 = Moderately severe depression, 20-27 = Severe depression     Objective:     Vitals:    06/28/24 0751   BP: 134/86   Pulse: 74   SpO2: 98%   Weight: 101 kg (222 lb 9.6 oz)      Estimated body mass index is 33.85 kg/m² as calculated from the following:    Height as of 3/21/23: 1.727 m (5' 8\").    Weight as of this encounter: 101 kg (222 lb 9.6 oz).     Physical Exam  Constitutional:       Appearance: Normal appearance. She is well-developed and well-groomed. She is obese.   HENT:      Head: Normocephalic.   Eyes:      Conjunctiva/sclera: Conjunctivae normal.   Neck:      Thyroid: No

## 2024-07-18 ENCOUNTER — TELEPHONE (OUTPATIENT)
Dept: FAMILY MEDICINE CLINIC | Age: 49
End: 2024-07-18

## 2024-07-18 NOTE — TELEPHONE ENCOUNTER
Pt was recently seen for workmans comp on her knee, is still having knee pain but in the back of her knee, does this still qualify as a workmans comp appt? Please call pt to let her know if it does or not

## 2024-07-22 NOTE — TELEPHONE ENCOUNTER
As long as the provider uses the same diagnosis that was previously approved by [employer], it should be covered.

## 2024-07-30 ENCOUNTER — OFFICE VISIT (OUTPATIENT)
Dept: FAMILY MEDICINE CLINIC | Age: 49
End: 2024-07-30

## 2024-07-30 VITALS — DIASTOLIC BLOOD PRESSURE: 78 MMHG | SYSTOLIC BLOOD PRESSURE: 116 MMHG | OXYGEN SATURATION: 98 % | HEART RATE: 76 BPM

## 2024-07-30 DIAGNOSIS — M25.562 ACUTE PAIN OF LEFT KNEE: Primary | ICD-10-CM

## 2024-07-30 DIAGNOSIS — E87.6 HYPOKALEMIA: ICD-10-CM

## 2024-07-30 RX ORDER — NITROGLYCERIN 0.4 MG/1
TABLET SUBLINGUAL
COMMUNITY
Start: 2024-07-14

## 2024-07-30 RX ORDER — POTASSIUM CHLORIDE 750 MG/1
TABLET, EXTENDED RELEASE ORAL
Qty: 90 TABLET | Refills: 1 | Status: SHIPPED | OUTPATIENT
Start: 2024-07-30

## 2024-07-30 RX ORDER — TOPIRAMATE 25 MG/1
25 TABLET ORAL
COMMUNITY

## 2024-07-30 NOTE — PROGRESS NOTES
tablet Take 1 tablet by mouth in the morning and at bedtime 90 tablet 1    KLOR-CON M10 10 MEQ extended release tablet TAKE 1 TABLET BY MOUTH EVERY DAY 90 tablet 1     No current facility-administered medications for this visit.     Review of Systems   Musculoskeletal:  Positive for arthralgias (left knee).            2/20/2024     4:28 PM 1/23/2023     4:15 PM 3/1/2022     3:15 PM 5/10/2021     2:19 PM 5/6/2020     8:53 AM 1/16/2019    10:18 AM   PHQ Scores   PHQ2 Score 0 0 0 0 0 0   PHQ9 Score 0 0 0 0 0 0     Interpretation of Total Score Depression Severity: 1-4 = Minimal depression, 5-9 = Mild depression, 10-14 = Moderate depression, 15-19 = Moderately severe depression, 20-27 = Severe depression     Objective:     Vitals:    07/30/24 0822   BP: 116/78   Pulse: 76   SpO2: 98%      Estimated body mass index is 33.85 kg/m² as calculated from the following:    Height as of 3/21/23: 1.727 m (5' 8\").    Weight as of 6/28/24: 101 kg (222 lb 9.6 oz).     Physical Exam  Constitutional:       Appearance: Normal appearance. She is obese.   HENT:      Head: Normocephalic.   Eyes:      Conjunctiva/sclera: Conjunctivae normal.   Cardiovascular:      Rate and Rhythm: Normal rate and regular rhythm.      Heart sounds: Normal heart sounds.   Pulmonary:      Effort: Pulmonary effort is normal.      Breath sounds: Normal breath sounds. No wheezing.   Musculoskeletal:      Cervical back: Neck supple.      Left knee: Tenderness (popliteal fossa) present.      Comments: Full extension achieved without pain. Pain noted during flexion \"pressure\". Left knee appears stable without signs of laxity.    Neurological:      Mental Status: She is alert and oriented to person, place, and time.      Gait: Gait abnormal (antalgic).       Please note that this chart was generated using voice recognition Dragon dictation software.  Although every effort was made to ensure the accuracy of this automated transcription, some errors in transcription

## 2024-07-30 NOTE — TELEPHONE ENCOUNTER
Ofelia Hoffman is requesting a refill on the following medication(s):  Requested Prescriptions     Pending Prescriptions Disp Refills    KLOR-CON M10 10 MEQ extended release tablet [Pharmacy Med Name: KLOR-CON M10 TABLET] 90 tablet 1     Sig: TAKE 1 TABLET BY MOUTH EVERY DAY       Last Visit Date (If Applicable):  6/28/2024    Next Visit Date:    7/30/2024

## 2024-08-01 DIAGNOSIS — I24.0 OCCLUSION OF LAD (LEFT ANTERIOR DESCENDING) ARTERY (HCC): ICD-10-CM

## 2024-08-01 NOTE — TELEPHONE ENCOUNTER
Patient called in stating that hematology states that pcp can now take over patients eliquis refills. Patient is requesting a refill on eliquis and states she is out and needs refill sent to [facility]     Medication pended if agreeable     Last Appt:  7/30/2024  Next Appt:   Visit date not found  Med verified in Epic

## 2024-08-08 ENCOUNTER — TELEPHONE (OUTPATIENT)
Dept: FAMILY MEDICINE CLINIC | Age: 49
End: 2024-08-08

## 2024-08-08 NOTE — TELEPHONE ENCOUNTER
Ofelia is trying to schedule PT, is this all still for workmans comp? PT would like to know if it is, if so pt needs a C9 completed

## 2024-09-12 ENCOUNTER — OFFICE VISIT (OUTPATIENT)
Dept: FAMILY MEDICINE CLINIC | Age: 49
End: 2024-09-12
Payer: COMMERCIAL

## 2024-09-12 VITALS
HEART RATE: 69 BPM | SYSTOLIC BLOOD PRESSURE: 118 MMHG | DIASTOLIC BLOOD PRESSURE: 84 MMHG | BODY MASS INDEX: 32.99 KG/M2 | OXYGEN SATURATION: 98 % | WEIGHT: 217 LBS

## 2024-09-12 DIAGNOSIS — N30.00 ACUTE CYSTITIS WITHOUT HEMATURIA: ICD-10-CM

## 2024-09-12 DIAGNOSIS — R30.0 DYSURIA: Primary | ICD-10-CM

## 2024-09-12 DIAGNOSIS — M32.9 SYSTEMIC LUPUS ERYTHEMATOSUS, UNSPECIFIED SLE TYPE, UNSPECIFIED ORGAN INVOLVEMENT STATUS (HCC): ICD-10-CM

## 2024-09-12 LAB
BACTERIA, URINE: ABNORMAL /HPF
BILIRUBIN, POC: NEGATIVE
BLOOD URINE, POC: NEGATIVE
CASTS UA: ABNORMAL /LPF
CLARITY, POC: NORMAL
COLOR, POC: NORMAL
CRYSTALS, UA: ABNORMAL
GLUCOSE URINE, POC: NEGATIVE MG/DL
KETONES, POC: NEGATIVE MG/DL
LEUKOCYTE EST, POC: NORMAL
NITRITE, POC: NEGATIVE
PH, POC: 8
PROTEIN, POC: NORMAL MG/DL
RBC URINE: ABNORMAL /HPF (ref 0–2)
SPECIFIC GRAVITY, POC: 1.01
SQUAMOUS EPITHELIAL: ABNORMAL /HPF
UROBILINOGEN, POC: 0.2 MG/DL
WBC URINE: ABNORMAL /HPF (ref 0–4)
YEAST, URINE: ABNORMAL

## 2024-09-12 PROCEDURE — 81002 URINALYSIS NONAUTO W/O SCOPE: CPT | Performed by: STUDENT IN AN ORGANIZED HEALTH CARE EDUCATION/TRAINING PROGRAM

## 2024-09-12 PROCEDURE — 99213 OFFICE O/P EST LOW 20 MIN: CPT | Performed by: STUDENT IN AN ORGANIZED HEALTH CARE EDUCATION/TRAINING PROGRAM

## 2024-09-12 PROCEDURE — 3074F SYST BP LT 130 MM HG: CPT | Performed by: STUDENT IN AN ORGANIZED HEALTH CARE EDUCATION/TRAINING PROGRAM

## 2024-09-12 PROCEDURE — 3079F DIAST BP 80-89 MM HG: CPT | Performed by: STUDENT IN AN ORGANIZED HEALTH CARE EDUCATION/TRAINING PROGRAM

## 2024-09-12 RX ORDER — CEPHALEXIN 500 MG/1
500 CAPSULE ORAL 3 TIMES DAILY
Qty: 21 CAPSULE | Refills: 0 | Status: SHIPPED | OUTPATIENT
Start: 2024-09-12 | End: 2024-09-19

## 2024-09-13 LAB
ANION GAP SERPL CALCULATED.3IONS-SCNC: 14.3 MMOL/L (ref 3–11)
BUN BLDV-MCNC: 17 MG/DL (ref 7–17)
CALCIUM SERPL-MCNC: 9.2 MG/DL (ref 8.4–10.2)
CHLORIDE BLD-SCNC: 106 MMOL/L (ref 98–120)
CO2: 23 MMOL/L (ref 22–31)
CREAT SERPL-MCNC: 1.1 MG/DL (ref 0.5–1)
GFR, ESTIMATED: > 60
GLUCOSE: 115 MG/DL (ref 65–105)
POTASSIUM SERPL-SCNC: 3.3 MMOL/L (ref 3.6–5)
SODIUM BLD-SCNC: 140 MMOL/L (ref 135–145)

## 2024-09-17 DIAGNOSIS — E87.6 HYPOKALEMIA: Primary | ICD-10-CM

## 2024-09-23 ENCOUNTER — OFFICE VISIT (OUTPATIENT)
Dept: FAMILY MEDICINE CLINIC | Age: 49
End: 2024-09-23
Payer: COMMERCIAL

## 2024-09-23 VITALS
OXYGEN SATURATION: 98 % | TEMPERATURE: 97.9 F | WEIGHT: 217 LBS | SYSTOLIC BLOOD PRESSURE: 118 MMHG | HEART RATE: 69 BPM | BODY MASS INDEX: 32.99 KG/M2 | DIASTOLIC BLOOD PRESSURE: 78 MMHG

## 2024-09-23 DIAGNOSIS — E87.6 HYPOKALEMIA: Primary | ICD-10-CM

## 2024-09-23 DIAGNOSIS — R07.9 CHEST PAIN, UNSPECIFIED TYPE: Primary | ICD-10-CM

## 2024-09-23 DIAGNOSIS — M32.9 SLE (SYSTEMIC LUPUS ERYTHEMATOSUS RELATED SYNDROME) (HCC): ICD-10-CM

## 2024-09-23 DIAGNOSIS — N39.0 URINARY TRACT INFECTION WITHOUT HEMATURIA, SITE UNSPECIFIED: ICD-10-CM

## 2024-09-23 LAB
ALBUMIN/GLOBULIN RATIO: 1.3 G/DL
ALBUMIN: 4.5 G/DL (ref 3.5–5)
ALP BLD-CCNC: 76 UNITS/L (ref 38–126)
ALT SERPL-CCNC: 21 UNITS/L (ref 4–35)
ANION GAP SERPL CALCULATED.3IONS-SCNC: 18.5 MMOL/L (ref 3–11)
AST SERPL-CCNC: 29 UNITS/L (ref 14–36)
BASOPHILS ABSOLUTE: 0.03 X10E3/?L (ref 0–0.3)
BASOPHILS RELATIVE PERCENT: 0.77 % (ref 0–3)
BILIRUB SERPL-MCNC: 0.3 MG/DL (ref 0.2–1.3)
BILIRUBIN, POC: NEGATIVE
BLOOD URINE, POC: NEGATIVE
BUN BLDV-MCNC: 20 MG/DL (ref 7–17)
C-REACTIVE PROTEIN: < 0.5 MG/DL (ref 0–1)
CALCIUM SERPL-MCNC: 9.3 MG/DL (ref 8.4–10.2)
CHLORIDE BLD-SCNC: 108 MMOL/L (ref 98–120)
CLARITY, POC: NORMAL
CO2: 20 MMOL/L (ref 22–31)
COLOR, POC: NORMAL
CREAT SERPL-MCNC: 1 MG/DL (ref 0.5–1)
EOSINOPHILS ABSOLUTE: 0.16 X10E3/?L (ref 0–1.1)
EOSINOPHILS RELATIVE PERCENT: 4.97 % (ref 0–10)
GFR, ESTIMATED: > 60
GLOBULIN: 3.4 G/DL
GLUCOSE URINE, POC: NEGATIVE MG/DL
GLUCOSE: 95 MG/DL (ref 65–105)
HCT VFR BLD CALC: 42.7 % (ref 37–47)
HEMOGLOBIN: 13.7 G/DL (ref 12–16)
KETONES, POC: NEGATIVE MG/DL
LEUKOCYTE EST, POC: NEGATIVE
LYMPHOCYTES ABSOLUTE: 1.05 X10E3/?L (ref 1–5.5)
LYMPHOCYTES RELATIVE PERCENT: 32.07 % (ref 20–51.1)
MCH RBC QN AUTO: 29.3 PG (ref 28.5–32.5)
MCHC RBC AUTO-ENTMCNC: 32 G/DL (ref 32–37)
MCV RBC AUTO: 91.5 FL (ref 80–94)
MONOCYTES ABSOLUTE: 0.31 X10E3/?L (ref 0.1–1)
MONOCYTES RELATIVE PERCENT: 9.39 % (ref 1.7–9.3)
NEUTROPHILS ABSOLUTE: 1.74 X10E3/?L (ref 2–8.1)
NEUTROPHILS RELATIVE PERCENT: 52.81 % (ref 42.2–75.2)
NITRITE, POC: NEGATIVE
PDW BLD-RTO: 12.1 % (ref 8.5–15.5)
PH, POC: 5
PLATELET # BLD: 145.7 THOU/MM3 (ref 130–400)
POTASSIUM SERPL-SCNC: 3.5 MMOL/L (ref 3.6–5)
PROTEIN, POC: NEGATIVE MG/DL
RBC # BLD: 4.66 M/UL (ref 4.2–5.4)
SED RATE, AUTOMATED: 13 MM/HR (ref 0–20)
SODIUM BLD-SCNC: 143 MMOL/L (ref 135–145)
SPECIFIC GRAVITY, POC: 1.01
TOTAL PROTEIN: 7.9 G/DL (ref 6.3–8.2)
TROPONIN I: < 0.012 NG/ML (ref 0–0.03)
TROPONIN I: < 0.012 NG/ML (ref 0–0.03)
TSH REFLEX FT4: 1.34 MIU/ML (ref 0.49–4.67)
UROBILINOGEN, POC: 0.2 MG/DL
WBC # BLD: 3.3 THOU/ML3 (ref 4.8–10.8)

## 2024-09-23 PROCEDURE — 99214 OFFICE O/P EST MOD 30 MIN: CPT | Performed by: STUDENT IN AN ORGANIZED HEALTH CARE EDUCATION/TRAINING PROGRAM

## 2024-09-23 PROCEDURE — 3074F SYST BP LT 130 MM HG: CPT | Performed by: STUDENT IN AN ORGANIZED HEALTH CARE EDUCATION/TRAINING PROGRAM

## 2024-09-23 PROCEDURE — 93000 ELECTROCARDIOGRAM COMPLETE: CPT | Performed by: STUDENT IN AN ORGANIZED HEALTH CARE EDUCATION/TRAINING PROGRAM

## 2024-09-23 PROCEDURE — 81002 URINALYSIS NONAUTO W/O SCOPE: CPT | Performed by: STUDENT IN AN ORGANIZED HEALTH CARE EDUCATION/TRAINING PROGRAM

## 2024-09-23 PROCEDURE — 3078F DIAST BP <80 MM HG: CPT | Performed by: STUDENT IN AN ORGANIZED HEALTH CARE EDUCATION/TRAINING PROGRAM

## 2024-09-23 RX ORDER — POTASSIUM CHLORIDE 750 MG/1
10 TABLET, EXTENDED RELEASE ORAL DAILY
Qty: 30 TABLET | Refills: 0 | Status: SHIPPED | OUTPATIENT
Start: 2024-09-23

## 2024-09-23 NOTE — PROGRESS NOTES
(DITROPAN-XL) 5 MG extended release tablet TAKE 1 TABLET BY MOUTH EVERY DAY 90 tablet 1    ergocalciferol (ERGOCALCIFEROL) 1.25 MG (99547 UT) capsule Take by mouth      tranexamic acid (LYSTEDA) 650 MG TABS tablet Take 2 tablets by mouth      Potassium Chloride 10 MEQ PACK Potassium Chloride (Klor-Con M10) 10 mEq tablet,ER particles/crystals Active 10 MEQ PO Daily November 8th, 2023 1:00am      EPINEPHrine (EPIPEN 2-JUANCARLOS) 0.3 MG/0.3ML SOAJ injection Use as directed for allergic reaction. 1 each 1    MAGNESIUM GLYCINATE PO Take by mouth      magnesium oxide (MAG-OX) 400 (240 Mg) MG tablet Take 1 tablet by mouth daily      amitriptyline (ELAVIL) 10 MG tablet TAKE 1 TABLET BY MOUTH EVERY DAY AT BEDTIME FOR 30 DAYS      montelukast (SINGULAIR) 10 MG tablet TAKE 1 TABLET BY MOUTH EVERY DAY      triamcinolone (KENALOG) 0.1 % cream Apply topically 2 times daily. 80 g 1    atorvastatin (LIPITOR) 40 MG tablet Take 1 tablet by mouth nightly      aspirin 81 MG EC tablet Take 1 tablet by mouth daily      Multiple Vitamin (MULTIVITAMIN ADULT PO) Take by mouth      hydroxychloroquine (PLAQUENIL) 200 MG tablet take 1 tablet by mouth twice a day      labetalol (NORMODYNE) 100 MG tablet take 1 tablet by mouth twice a day      NIFEdipine (PROCARDIA XL) 90 MG extended release tablet take 1 tablet by mouth once daily      olmesartan-hydroCHLOROthiazide (BENICAR HCT) 40-25 MG per tablet take 1 tablet by mouth once daily      ferrous sulfate (IRON 325) 325 (65 Fe) MG tablet 1 tablet  in the morning and 1 tablet in the evening.      potassium chloride (KLOR-CON M) 10 MEQ extended release tablet Take 1 tablet by mouth daily 30 tablet 0     No current facility-administered medications for this visit.        Allergies   Allergen Reactions    Dexlansoprazole     Indomethacin     Tetanus Toxoid        Past Medical History:   Diagnosis Date    Adenomyosis     Chronic fatigue     Constipation 6/1/2021    Heart attack (HCC) 05/19/2021    History

## 2024-10-25 DIAGNOSIS — R32 URINARY INCONTINENCE, UNSPECIFIED TYPE: ICD-10-CM

## 2024-10-25 RX ORDER — OXYBUTYNIN CHLORIDE 5 MG/1
TABLET, EXTENDED RELEASE ORAL
Qty: 90 TABLET | Refills: 0 | Status: SHIPPED | OUTPATIENT
Start: 2024-10-25

## 2024-10-25 NOTE — TELEPHONE ENCOUNTER
Ofelia Hoffman is requesting a refill on the following medication(s):  Requested Prescriptions     Pending Prescriptions Disp Refills    oxyBUTYnin (DITROPAN-XL) 5 MG extended release tablet [Pharmacy Med Name: OXYBUTYNIN CL ER 5 MG TABLET] 90 tablet 1     Sig: TAKE 1 TABLET BY MOUTH EVERY DAY       Last Visit Date (If Applicable):  9/23/2024    Next Visit Date:    Visit date not found

## 2024-12-10 ENCOUNTER — OFFICE VISIT (OUTPATIENT)
Dept: FAMILY MEDICINE CLINIC | Age: 49
End: 2024-12-10
Payer: COMMERCIAL

## 2024-12-10 VITALS
BODY MASS INDEX: 33.42 KG/M2 | OXYGEN SATURATION: 98 % | HEART RATE: 78 BPM | WEIGHT: 219.8 LBS | SYSTOLIC BLOOD PRESSURE: 132 MMHG | DIASTOLIC BLOOD PRESSURE: 80 MMHG

## 2024-12-10 DIAGNOSIS — L30.4 INTERTRIGO: Primary | ICD-10-CM

## 2024-12-10 DIAGNOSIS — L81.9 HYPOPIGMENTED SKIN LESION: ICD-10-CM

## 2024-12-10 DIAGNOSIS — R42 LIGHTHEADED: ICD-10-CM

## 2024-12-10 PROCEDURE — 3079F DIAST BP 80-89 MM HG: CPT | Performed by: NURSE PRACTITIONER

## 2024-12-10 PROCEDURE — 99214 OFFICE O/P EST MOD 30 MIN: CPT | Performed by: NURSE PRACTITIONER

## 2024-12-10 PROCEDURE — 3075F SYST BP GE 130 - 139MM HG: CPT | Performed by: NURSE PRACTITIONER

## 2024-12-10 RX ORDER — CLOBETASOL PROPIONATE 0.5 MG/G
CREAM TOPICAL
Qty: 60 G | Refills: 1 | Status: SHIPPED | OUTPATIENT
Start: 2024-12-10

## 2024-12-10 NOTE — PROGRESS NOTES
remained stable without worsening. Patient also notes areas of hypopigmentation on left upper arm, which she attributes to a previous tetanus shot reaction from [id number]. Previous dermatology consultation was reportedly unhelpful, with expensive cream causing skin burning.    Reports intermittent dizziness occurring 1-2 times per week, lasting few seconds, with no clear pattern or triggers. Can occur while sitting, standing, or upon waking. Denies any specific associated symptoms.      Current Outpatient Medications   Medication Sig Dispense Refill    clobetasol (TEMOVATE) 0.05 % cream Apply topically 2 times daily to affected area. 60 g 1    oxyBUTYnin (DITROPAN-XL) 5 MG extended release tablet TAKE 1 TABLET BY MOUTH EVERY DAY 90 tablet 0    potassium chloride (KLOR-CON M) 10 MEQ extended release tablet Take 1 tablet by mouth daily 30 tablet 0    KLOR-CON M10 10 MEQ extended release tablet TAKE 1 TABLET BY MOUTH EVERY DAY 90 tablet 1    nitroGLYCERIN (NITROSTAT) 0.4 MG SL tablet PLEASE SEE ATTACHED FOR DETAILED DIRECTIONS      topiramate (TOPAMAX) 25 MG tablet Take 2 tablets by mouth      ergocalciferol (ERGOCALCIFEROL) 1.25 MG (41672 UT) capsule Take by mouth      Potassium Chloride 10 MEQ PACK Potassium Chloride (Klor-Con M10) 10 mEq tablet,ER particles/crystals Active 10 MEQ PO Daily November 8th, 2023 1:00am      MAGNESIUM GLYCINATE PO Take by mouth      magnesium oxide (MAG-OX) 400 (240 Mg) MG tablet Take 1 tablet by mouth daily      montelukast (SINGULAIR) 10 MG tablet TAKE 1 TABLET BY MOUTH EVERY DAY      atorvastatin (LIPITOR) 40 MG tablet Take 1 tablet by mouth nightly      aspirin 81 MG EC tablet Take 1 tablet by mouth daily      Multiple Vitamin (MULTIVITAMIN ADULT PO) Take by mouth      hydroxychloroquine (PLAQUENIL) 200 MG tablet take 1 tablet by mouth twice a day      labetalol (NORMODYNE) 100 MG tablet take 1 tablet by mouth twice a day      NIFEdipine (PROCARDIA XL) 90 MG extended release tablet

## 2024-12-17 LAB
ANION GAP SERPL CALCULATED.3IONS-SCNC: 8.6 MMOL/L (ref 3–11)
BASOPHILS ABSOLUTE: 0.04 X10E3/?L (ref 0–0.3)
BASOPHILS RELATIVE PERCENT: 1.05 % (ref 0–3)
BUN BLDV-MCNC: 20 MG/DL (ref 7–17)
CALCIUM SERPL-MCNC: 10 MG/DL (ref 8.4–10.2)
CHLORIDE BLD-SCNC: 109 MMOL/L (ref 98–120)
CO2: 24 MMOL/L (ref 22–31)
CREAT SERPL-MCNC: 1.2 MG/DL (ref 0.5–1)
EOSINOPHILS ABSOLUTE: 0.17 X10E3/?L (ref 0–1.1)
EOSINOPHILS RELATIVE PERCENT: 4.83 % (ref 0–10)
GFR, ESTIMATED: > 60
GLUCOSE: 97 MG/DL (ref 65–105)
HCT VFR BLD CALC: 46.6 % (ref 37–47)
HEMOGLOBIN: 14.5 G/DL (ref 12–16)
LYMPHOCYTES ABSOLUTE: 1.03 X10E3/?L (ref 1–5.5)
LYMPHOCYTES RELATIVE PERCENT: 29.93 % (ref 20–51.1)
MCH RBC QN AUTO: 29.2 PG (ref 28.5–32.5)
MCHC RBC AUTO-ENTMCNC: 31.2 G/DL (ref 32–37)
MCV RBC AUTO: 93.6 FL (ref 80–94)
MONOCYTES ABSOLUTE: 0.33 X10E3/?L (ref 0.1–1)
MONOCYTES RELATIVE PERCENT: 9.44 % (ref 1.7–9.3)
NEUTROPHILS ABSOLUTE: 1.89 X10E3/?L (ref 2–8.1)
NEUTROPHILS RELATIVE PERCENT: 54.76 % (ref 42.2–75.2)
PDW BLD-RTO: 12 % (ref 8.5–15.5)
PLATELET # BLD: 148.1 THOU/MM3 (ref 130–400)
POTASSIUM SERPL-SCNC: 3.7 MMOL/L (ref 3.6–5)
RBC # BLD: 4.97 M/UL (ref 4.2–5.4)
SODIUM BLD-SCNC: 142 MMOL/L (ref 135–145)
WBC # BLD: 3.5 THOU/ML3 (ref 4.8–10.8)

## 2024-12-22 PROBLEM — L81.9 HYPOPIGMENTED SKIN LESION: Status: ACTIVE | Noted: 2024-12-22

## 2025-01-20 DIAGNOSIS — E87.6 HYPOKALEMIA: ICD-10-CM

## 2025-01-20 RX ORDER — POTASSIUM CHLORIDE 750 MG/1
TABLET, EXTENDED RELEASE ORAL
Qty: 90 TABLET | Refills: 1 | Status: SHIPPED | OUTPATIENT
Start: 2025-01-20

## 2025-01-20 NOTE — TELEPHONE ENCOUNTER
Ofelia Hoffman is requesting a refill on the following medication(s):  Requested Prescriptions     Pending Prescriptions Disp Refills    KLOR-CON M10 10 MEQ extended release tablet [Pharmacy Med Name: KLOR-CON M10 TABLET] 90 tablet 1     Sig: TAKE 1 TABLET BY MOUTH EVERY DAY       Last Visit Date (If Applicable):  12/10/2024    Next Visit Date:    Visit date not found

## 2025-01-24 DIAGNOSIS — R32 URINARY INCONTINENCE, UNSPECIFIED TYPE: ICD-10-CM

## 2025-01-24 RX ORDER — OXYBUTYNIN CHLORIDE 5 MG/1
TABLET, EXTENDED RELEASE ORAL
Qty: 90 TABLET | Refills: 2 | Status: SHIPPED | OUTPATIENT
Start: 2025-01-24

## 2025-01-24 NOTE — TELEPHONE ENCOUNTER
Ofelia Hoffman is calling to request a refill on the following medication(s):  Requested Prescriptions     Pending Prescriptions Disp Refills    oxyBUTYnin (DITROPAN-XL) 5 MG extended release tablet [Pharmacy Med Name: OXYBUTYNIN CL ER 5 MG TABLET] 90 tablet 0     Sig: TAKE 1 TABLET BY MOUTH EVERY DAY       Last Visit Date (If Applicable):  12/10/2024    Next Visit Date:    Visit date not found

## 2025-01-28 ENCOUNTER — OFFICE VISIT (OUTPATIENT)
Dept: FAMILY MEDICINE CLINIC | Age: 50
End: 2025-01-28
Payer: COMMERCIAL

## 2025-01-28 VITALS — HEART RATE: 88 BPM | OXYGEN SATURATION: 98 % | SYSTOLIC BLOOD PRESSURE: 124 MMHG | DIASTOLIC BLOOD PRESSURE: 80 MMHG

## 2025-01-28 DIAGNOSIS — L25.9 CONTACT DERMATITIS, UNSPECIFIED CONTACT DERMATITIS TYPE, UNSPECIFIED TRIGGER: Primary | ICD-10-CM

## 2025-01-28 PROCEDURE — 3074F SYST BP LT 130 MM HG: CPT | Performed by: NURSE PRACTITIONER

## 2025-01-28 PROCEDURE — 3079F DIAST BP 80-89 MM HG: CPT | Performed by: NURSE PRACTITIONER

## 2025-01-28 PROCEDURE — 99213 OFFICE O/P EST LOW 20 MIN: CPT | Performed by: NURSE PRACTITIONER

## 2025-01-28 RX ORDER — TRIAMCINOLONE ACETONIDE 1 MG/G
CREAM TOPICAL
Qty: 80 G | Refills: 1 | Status: SHIPPED | OUTPATIENT
Start: 2025-01-28

## 2025-01-28 RX ORDER — CYCLOBENZAPRINE HCL 10 MG
TABLET ORAL
COMMUNITY
Start: 2025-01-27

## 2025-01-28 SDOH — ECONOMIC STABILITY: FOOD INSECURITY: WITHIN THE PAST 12 MONTHS, THE FOOD YOU BOUGHT JUST DIDN'T LAST AND YOU DIDN'T HAVE MONEY TO GET MORE.: NEVER TRUE

## 2025-01-28 SDOH — ECONOMIC STABILITY: FOOD INSECURITY: WITHIN THE PAST 12 MONTHS, YOU WORRIED THAT YOUR FOOD WOULD RUN OUT BEFORE YOU GOT MONEY TO BUY MORE.: NEVER TRUE

## 2025-01-28 SDOH — ECONOMIC STABILITY: TRANSPORTATION INSECURITY
IN THE PAST 12 MONTHS, HAS LACK OF TRANSPORTATION KEPT YOU FROM MEETINGS, WORK, OR FROM GETTING THINGS NEEDED FOR DAILY LIVING?: NO

## 2025-01-28 SDOH — ECONOMIC STABILITY: TRANSPORTATION INSECURITY
IN THE PAST 12 MONTHS, HAS THE LACK OF TRANSPORTATION KEPT YOU FROM MEDICAL APPOINTMENTS OR FROM GETTING MEDICATIONS?: NO

## 2025-01-28 SDOH — ECONOMIC STABILITY: INCOME INSECURITY: IN THE LAST 12 MONTHS, WAS THERE A TIME WHEN YOU WERE NOT ABLE TO PAY THE MORTGAGE OR RENT ON TIME?: NO

## 2025-01-28 ASSESSMENT — PATIENT HEALTH QUESTIONNAIRE - PHQ9
SUM OF ALL RESPONSES TO PHQ QUESTIONS 1-9: 0
SUM OF ALL RESPONSES TO PHQ QUESTIONS 1-9: 0
1. LITTLE INTEREST OR PLEASURE IN DOING THINGS: NOT AT ALL
SUM OF ALL RESPONSES TO PHQ9 QUESTIONS 1 & 2: 0
1. LITTLE INTEREST OR PLEASURE IN DOING THINGS: NOT AT ALL
2. FEELING DOWN, DEPRESSED OR HOPELESS: NOT AT ALL
SUM OF ALL RESPONSES TO PHQ QUESTIONS 1-9: 0
SUM OF ALL RESPONSES TO PHQ9 QUESTIONS 1 & 2: 0
SUM OF ALL RESPONSES TO PHQ QUESTIONS 1-9: 0
2. FEELING DOWN, DEPRESSED OR HOPELESS: NOT AT ALL
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
## 2025-02-19 ENCOUNTER — OFFICE VISIT (OUTPATIENT)
Dept: FAMILY MEDICINE CLINIC | Age: 50
End: 2025-02-19
Payer: COMMERCIAL

## 2025-02-19 VITALS
DIASTOLIC BLOOD PRESSURE: 78 MMHG | HEART RATE: 67 BPM | SYSTOLIC BLOOD PRESSURE: 114 MMHG | WEIGHT: 218 LBS | BODY MASS INDEX: 33.15 KG/M2 | OXYGEN SATURATION: 98 %

## 2025-02-19 DIAGNOSIS — R34 DECREASED URINE OUTPUT: Primary | ICD-10-CM

## 2025-02-19 LAB
APPEARANCE FLUID: NORMAL
BILIRUBIN, POC: NEGATIVE
BLOOD URINE, POC: NEGATIVE
CLARITY, POC: CLEAR
COLOR, POC: YELLOW
GLUCOSE URINE, POC: NEGATIVE MG/DL
KETONES, POC: NEGATIVE MG/DL
LEUKOCYTE EST, POC: NEGATIVE
NITRITE, POC: NEGATIVE
PH, POC: 5.5
PROTEIN, POC: NEGATIVE MG/DL
SPECIFIC GRAVITY, POC: 1.01
UROBILINOGEN, POC: 0.2 MG/DL

## 2025-02-19 PROCEDURE — 99213 OFFICE O/P EST LOW 20 MIN: CPT | Performed by: NURSE PRACTITIONER

## 2025-02-19 PROCEDURE — 81002 URINALYSIS NONAUTO W/O SCOPE: CPT | Performed by: NURSE PRACTITIONER

## 2025-02-19 PROCEDURE — 3074F SYST BP LT 130 MM HG: CPT | Performed by: NURSE PRACTITIONER

## 2025-02-19 PROCEDURE — 3078F DIAST BP <80 MM HG: CPT | Performed by: NURSE PRACTITIONER

## 2025-02-19 ASSESSMENT — ENCOUNTER SYMPTOMS
DIARRHEA: 0
BACK PAIN: 0
ABDOMINAL PAIN: 0
ROS SKIN COMMENTS: DRY
NAUSEA: 0

## 2025-02-19 NOTE — PROGRESS NOTES
[address], [address]  [place] [id number]  Dept: [phone]  Dept Fax: [phone]    Date of Service:  2/19/2025    Ofelia Hoffman is a 49 y.o. female who presents today for her medical conditions/complaints as noted below.      Chief Complaint   Patient presents with    Other     Has been having trouble urinating, thinks she might be dehydrated.      DIAGNOSIS / PLAN:     Assessment & Plan  1. Suspected urinary tract infection (UTI)  Her urine sample did not reveal any abnormalities, including the absence of blood.   Treatment plan: She was advised to increase her fluid intake to facilitate urination.  Clinical decision making: If she continues to experience difficulty with urination, a referral to a urologist will be considered.    2. Dehydration  Her symptoms of dry eyes, dry mouth, and dry skin suggest dehydration.  Treatment plan: She was advised to increase her water intake. Over-the-counter dry eye drops were recommended for her dry eyes.  Clinical decision making: If symptoms persist despite increased fluid intake, further evaluation will be necessary.    3. Weight loss  She reports a decreased appetite and difficulty losing weight.    Decreased urine output  -     POCT Urinalysis no Micro       Results for orders placed or performed in visit on 02/19/25   POCT Urinalysis no Micro   Result Value Ref Range    Color, UA yellow     Clarity, UA clear     Glucose, UA POC negative mg/dL    Bilirubin, UA negative     Ketones, UA negative mg/dL    Spec Grav, UA 1.015     Blood, UA POC negative     pH, UA 5.5     Protein, UA POC negative mg/dL    Urobilinogen, UA 0.2 mg/dL    Leukocytes, UA negative     Nitrite, UA negative     Appearance, Fluid          The patient agreed with the proposed treatment plan. Follow-up scheduled as directed.    Return if symptoms worsen or fail to improve.    SUBJECTIVE:     History of Present Illness  The patient presents for

## 2025-03-31 ENCOUNTER — OFFICE VISIT (OUTPATIENT)
Dept: FAMILY MEDICINE CLINIC | Age: 50
End: 2025-03-31
Payer: COMMERCIAL

## 2025-03-31 VITALS
DIASTOLIC BLOOD PRESSURE: 74 MMHG | HEART RATE: 69 BPM | BODY MASS INDEX: 33.94 KG/M2 | OXYGEN SATURATION: 98 % | SYSTOLIC BLOOD PRESSURE: 112 MMHG | WEIGHT: 223.2 LBS

## 2025-03-31 DIAGNOSIS — M32.9 SYSTEMIC LUPUS ERYTHEMATOSUS-RELATED SYNDROME (HCC): ICD-10-CM

## 2025-03-31 DIAGNOSIS — H65.192 ACUTE MEE (MIDDLE EAR EFFUSION), LEFT: Primary | ICD-10-CM

## 2025-03-31 DIAGNOSIS — I10 PRIMARY HYPERTENSION: ICD-10-CM

## 2025-03-31 PROCEDURE — 99213 OFFICE O/P EST LOW 20 MIN: CPT | Performed by: NURSE PRACTITIONER

## 2025-03-31 PROCEDURE — 3074F SYST BP LT 130 MM HG: CPT | Performed by: NURSE PRACTITIONER

## 2025-03-31 PROCEDURE — 3078F DIAST BP <80 MM HG: CPT | Performed by: NURSE PRACTITIONER

## 2025-03-31 ASSESSMENT — ENCOUNTER SYMPTOMS
RESPIRATORY NEGATIVE: 1
EYE ITCHING: 1

## 2025-03-31 NOTE — PROGRESS NOTES
[place] [address] [facility], [address]  [place], [place] [address]  Dept: [phone]  Dept Fax: [phone]    Date of Service:  3/31/2025    Ofelia Hoffman is a 49 y.o. female who presents today for her medical conditions/complaints as noted below.      Chief Complaint   Patient presents with    Ear Pain     Left ear pain started Friday, pain comes and goes    Other     Believes that she is in a fibromyalgia flare    Dry Eye     Thinks she has developed an allergy in her eyes.       DIAGNOSIS / PLAN:     Assessment & Plan  1. Left ear pain: Improving.  - Examination revealed fluid in the left ear and a small speck of wax on the eardrum.  - No treatment is necessary at this time; Flonase was suggested to aid in fluid drainage if needed.    2. Itchy eyes: Likely due to allergies.  - Advised to use otc allergy eye drops for symptomatic relief.  - Over-the-counter allergy tablets were recommended if additional symptoms develop.    3. Fibromyalgia: Chronic.  - Advised to maintain an active lifestyle as a form of treatment.  - Tylenol was recommended for pain management, but ibuprofen should be avoided due to the use of Eliquis.    4. Lupus: Stable.  - Currently on hydroxychloroquine for lupus management.  - Advised to continue the current medication regimen.  -Under the care of rheumatology.     1. Acute YASMIN (middle ear effusion), left  2. Primary hypertension  Assessment & Plan:   Chronic, at goal (stable), continue current treatment plan  3. Systemic lupus erythematosus-related syndrome (HCC)  Assessment & Plan:   Monitored by specialist- no acute findings meriting change in the plan     Encouraged symptomatic treatment, increase activity as tolerated to help the fibromyalgia. Follow-up for worsening or persistent symptoms. Patient verbalizes understanding regarding plan of care and all questions answered.    Return if symptoms worsen or fail to improve.    SUBJECTIVE:     History of

## 2025-07-11 DIAGNOSIS — E87.6 HYPOKALEMIA: ICD-10-CM

## 2025-07-11 NOTE — TELEPHONE ENCOUNTER
Ofelia Hoffman is requesting a refill on the following medication(s):  Requested Prescriptions     Pending Prescriptions Disp Refills    potassium chloride (KLOR-CON M) 10 MEQ extended release tablet [Pharmacy Med Name: POTASSIUM CL ER 10 MEQ TAB MCR] 90 tablet 0     Sig: TAKE 1 TABLET BY MOUTH EVERY DAY       Last Visit Date (If Applicable):  3/31/2025      Next Visit Date:    Visit date not found

## 2025-07-14 RX ORDER — POTASSIUM CHLORIDE 750 MG/1
10 TABLET, EXTENDED RELEASE ORAL DAILY
Qty: 90 TABLET | Refills: 0 | Status: SHIPPED | OUTPATIENT
Start: 2025-07-14

## 2025-08-23 DIAGNOSIS — R32 URINARY INCONTINENCE, UNSPECIFIED TYPE: ICD-10-CM

## 2025-08-25 RX ORDER — OXYBUTYNIN CHLORIDE 5 MG/1
5 TABLET, EXTENDED RELEASE ORAL DAILY
Qty: 90 TABLET | Refills: 2 | Status: SHIPPED | OUTPATIENT
Start: 2025-08-25